# Patient Record
Sex: FEMALE | Race: WHITE | Employment: OTHER | URBAN - METROPOLITAN AREA
[De-identification: names, ages, dates, MRNs, and addresses within clinical notes are randomized per-mention and may not be internally consistent; named-entity substitution may affect disease eponyms.]

---

## 2017-01-17 DIAGNOSIS — E08.40 DIABETES MELLITUS DUE TO UNDERLYING CONDITION WITH DIABETIC NEUROPATHY, WITHOUT LONG-TERM CURRENT USE OF INSULIN (HCC): ICD-10-CM

## 2017-01-17 RX ORDER — GLIMEPIRIDE 4 MG/1
TABLET ORAL
Qty: 180 TABLET | Refills: 0 | Status: SHIPPED | OUTPATIENT
Start: 2017-01-17 | End: 2017-04-28

## 2017-01-31 DIAGNOSIS — I10 ESSENTIAL HYPERTENSION WITH GOAL BLOOD PRESSURE LESS THAN 130/80: ICD-10-CM

## 2017-01-31 DIAGNOSIS — M17.0 PRIMARY OSTEOARTHRITIS OF BOTH KNEES: Primary | ICD-10-CM

## 2017-02-01 RX ORDER — FUROSEMIDE 20 MG/1
20 TABLET ORAL DAILY
Qty: 90 TABLET | Refills: 0 | Status: SHIPPED | OUTPATIENT
Start: 2017-02-01 | End: 2017-06-07

## 2017-02-01 NOTE — TELEPHONE ENCOUNTER
From: Shelton Poe  To: Rae Meier MD  Sent: 1/31/2017 7:26 PM CST  Subject: Medication Renewal Request    Original authorizing provider: MD Shelton Tran would like a refill of the following medications:  FUROSEMIDE 20 MG Oral

## 2017-02-01 NOTE — TELEPHONE ENCOUNTER
From: Pratima Prabhakar  To: Riana Mckenzie MD  Sent: 1/31/2017 7:28 PM CST  Subject: Medication Renewal Request    Original authorizing provider: MD Pratima Mccloud would like a refill of the following medications:  HYDROcodone-acetaminop

## 2017-02-02 RX ORDER — HYDROCODONE BITARTRATE AND ACETAMINOPHEN 10; 325 MG/1; MG/1
1 TABLET ORAL EVERY 6 HOURS PRN
Qty: 120 TABLET | Refills: 0 | Status: SHIPPED | OUTPATIENT
Start: 2017-02-02 | End: 2017-04-10

## 2017-02-07 DIAGNOSIS — E08.40 DIABETES MELLITUS DUE TO UNDERLYING CONDITION WITH DIABETIC NEUROPATHY, WITHOUT LONG-TERM CURRENT USE OF INSULIN (HCC): Primary | ICD-10-CM

## 2017-02-13 ENCOUNTER — TELEPHONE (OUTPATIENT)
Dept: INTERNAL MEDICINE CLINIC | Facility: CLINIC | Age: 82
End: 2017-02-13

## 2017-02-13 NOTE — TELEPHONE ENCOUNTER
GÓMEZ CONTOUR NEXT TEST In Vitro Strip was prescribed to pt - we received fax from [facility] stating that this rx is not Medicare compliant - please send alternate rx to [phone]. Phone #[phone].    Paper in triage

## 2017-02-23 DIAGNOSIS — E78.2 HYPERLIPEMIA, MIXED: Primary | ICD-10-CM

## 2017-02-23 RX ORDER — ATORVASTATIN CALCIUM 80 MG/1
TABLET, FILM COATED ORAL
Qty: 90 TABLET | Refills: 0 | Status: SHIPPED | OUTPATIENT
Start: 2017-02-23 | End: 2017-05-24

## 2017-03-08 ENCOUNTER — MED REC SCAN ONLY (OUTPATIENT)
Dept: INTERNAL MEDICINE CLINIC | Facility: CLINIC | Age: 82
End: 2017-03-08

## 2017-03-08 DIAGNOSIS — E08.40 DIABETES MELLITUS DUE TO UNDERLYING CONDITION WITH DIABETIC NEUROPATHY, WITHOUT LONG-TERM CURRENT USE OF INSULIN (HCC): ICD-10-CM

## 2017-03-08 DIAGNOSIS — E03.4 HYPOTHYROIDISM DUE TO ACQUIRED ATROPHY OF THYROID: ICD-10-CM

## 2017-03-08 DIAGNOSIS — E78.2 HYPERLIPEMIA, MIXED: Primary | ICD-10-CM

## 2017-03-08 DIAGNOSIS — I10 ESSENTIAL HYPERTENSION WITH GOAL BLOOD PRESSURE LESS THAN 130/80: ICD-10-CM

## 2017-04-10 ENCOUNTER — OFFICE VISIT (OUTPATIENT)
Dept: INTERNAL MEDICINE CLINIC | Facility: CLINIC | Age: 82
End: 2017-04-10

## 2017-04-10 ENCOUNTER — APPOINTMENT (OUTPATIENT)
Dept: LAB | Age: 82
End: 2017-04-10
Attending: INTERNAL MEDICINE
Payer: MEDICARE

## 2017-04-10 VITALS
DIASTOLIC BLOOD PRESSURE: 60 MMHG | TEMPERATURE: 98 F | OXYGEN SATURATION: 98 % | SYSTOLIC BLOOD PRESSURE: 130 MMHG | HEIGHT: 69 IN | BODY MASS INDEX: 22.81 KG/M2 | WEIGHT: 154 LBS | RESPIRATION RATE: 16 BRPM | HEART RATE: 82 BPM

## 2017-04-10 DIAGNOSIS — R56.9 SEIZURE (HCC): ICD-10-CM

## 2017-04-10 DIAGNOSIS — K21.9 GASTROESOPHAGEAL REFLUX DISEASE WITHOUT ESOPHAGITIS: ICD-10-CM

## 2017-04-10 DIAGNOSIS — I10 ESSENTIAL HYPERTENSION WITH GOAL BLOOD PRESSURE LESS THAN 130/80: ICD-10-CM

## 2017-04-10 DIAGNOSIS — E78.2 HYPERLIPEMIA, MIXED: ICD-10-CM

## 2017-04-10 DIAGNOSIS — M17.0 PRIMARY OSTEOARTHRITIS OF BOTH KNEES: ICD-10-CM

## 2017-04-10 DIAGNOSIS — N18.30 UNCONTROLLED SECONDARY DIABETES MELLITUS WITH STAGE 3 CKD (GFR 30-59) (HCC): ICD-10-CM

## 2017-04-10 DIAGNOSIS — Z13.31 DEPRESSION SCREENING: ICD-10-CM

## 2017-04-10 DIAGNOSIS — I25.10 ATHEROSCLEROSIS OF NATIVE CORONARY ARTERY OF NATIVE HEART WITHOUT ANGINA PECTORIS: ICD-10-CM

## 2017-04-10 DIAGNOSIS — Z00.00 ENCOUNTER FOR ANNUAL HEALTH EXAMINATION: ICD-10-CM

## 2017-04-10 DIAGNOSIS — E03.4 HYPOTHYROIDISM DUE TO ACQUIRED ATROPHY OF THYROID: ICD-10-CM

## 2017-04-10 DIAGNOSIS — E08.40 DIABETES MELLITUS DUE TO UNDERLYING CONDITION WITH DIABETIC NEUROPATHY, WITHOUT LONG-TERM CURRENT USE OF INSULIN (HCC): ICD-10-CM

## 2017-04-10 DIAGNOSIS — E11.40 PAINFUL DIABETIC NEUROPATHY (HCC): ICD-10-CM

## 2017-04-10 DIAGNOSIS — E13.65 UNCONTROLLED SECONDARY DIABETES MELLITUS WITH STAGE 3 CKD (GFR 30-59) (HCC): ICD-10-CM

## 2017-04-10 DIAGNOSIS — E13.22 UNCONTROLLED SECONDARY DIABETES MELLITUS WITH STAGE 3 CKD (GFR 30-59) (HCC): ICD-10-CM

## 2017-04-10 DIAGNOSIS — I73.9 PAD (PERIPHERAL ARTERY DISEASE) (HCC): ICD-10-CM

## 2017-04-10 DIAGNOSIS — L97.522 DIABETIC ULCER OF TOE OF LEFT FOOT ASSOCIATED WITH TYPE 2 DIABETES MELLITUS, WITH FAT LAYER EXPOSED (HCC): ICD-10-CM

## 2017-04-10 DIAGNOSIS — Z86.73 HISTORY OF STROKE: ICD-10-CM

## 2017-04-10 DIAGNOSIS — E11.40 TYPE 2 DIABETES MELLITUS WITH DIABETIC NEUROPATHY, WITHOUT LONG-TERM CURRENT USE OF INSULIN (HCC): ICD-10-CM

## 2017-04-10 DIAGNOSIS — I48.20 CHRONIC ATRIAL FIBRILLATION (HCC): ICD-10-CM

## 2017-04-10 DIAGNOSIS — Z00.00 PHYSICAL EXAM, ANNUAL: Primary | ICD-10-CM

## 2017-04-10 DIAGNOSIS — E11.621 DIABETIC ULCER OF TOE OF LEFT FOOT ASSOCIATED WITH TYPE 2 DIABETES MELLITUS, WITH FAT LAYER EXPOSED (HCC): ICD-10-CM

## 2017-04-10 DIAGNOSIS — K21.9 GASTROESOPHAGEAL REFLUX DISEASE WITHOUT ESOPHAGITIS: Primary | ICD-10-CM

## 2017-04-10 PROBLEM — IMO0002 UNCONTROLLED SECONDARY DIABETES MELLITUS WITH STAGE 3 CKD (GFR 30-59): Status: ACTIVE | Noted: 2017-04-10

## 2017-04-10 PROCEDURE — G0444 DEPRESSION SCREEN ANNUAL: HCPCS | Performed by: INTERNAL MEDICINE

## 2017-04-10 PROCEDURE — 36415 COLL VENOUS BLD VENIPUNCTURE: CPT

## 2017-04-10 PROCEDURE — 80061 LIPID PANEL: CPT

## 2017-04-10 PROCEDURE — 82570 ASSAY OF URINE CREATININE: CPT

## 2017-04-10 PROCEDURE — 84443 ASSAY THYROID STIM HORMONE: CPT

## 2017-04-10 PROCEDURE — 80053 COMPREHEN METABOLIC PANEL: CPT

## 2017-04-10 PROCEDURE — 83036 HEMOGLOBIN GLYCOSYLATED A1C: CPT

## 2017-04-10 PROCEDURE — 82043 UR ALBUMIN QUANTITATIVE: CPT

## 2017-04-10 PROCEDURE — G0439 PPPS, SUBSEQ VISIT: HCPCS | Performed by: INTERNAL MEDICINE

## 2017-04-10 RX ORDER — NORTRIPTYLINE HYDROCHLORIDE 25 MG/1
25 CAPSULE ORAL NIGHTLY
Qty: 90 CAPSULE | Refills: 1 | Status: SHIPPED | OUTPATIENT
Start: 2017-04-10 | End: 2017-10-04

## 2017-04-10 RX ORDER — HYDROCODONE BITARTRATE AND ACETAMINOPHEN 10; 325 MG/1; MG/1
1 TABLET ORAL EVERY 6 HOURS PRN
Qty: 120 TABLET | Refills: 0 | Status: SHIPPED | OUTPATIENT
Start: 2017-04-10 | End: 2017-06-29

## 2017-04-10 NOTE — PROGRESS NOTES
HPI:   Lilliam Monsalve is a 80year old female who presents for a Medicare Subsequent Annual Wellness visit (Pt already had Initial Annual Wellness). HPI:  Here for physical  Overall fells well. Using walker.  No cp or sob  No new stroke sxs  Denies w allergic to adhesive tape and sulfa antibiotics.     CURRENT MEDICATIONS:     Outpatient Prescriptions Marked as Taking for the 4/10/17 encounter (Office Visit) with Andre Singh MD:  [address] 40 MG Oral Capsule Delayed Release TAKE ONE CAPSULE BY MOUTH TWO Gout (9/16/2010); Measles (9/16/2010); Rubella (9/16/2010); Scarlet fever (9/16/2010); Coronary atherosclerosis of unspecified type of vessel, native or graft (6/29/2012); Atrial [facility] (6/29/2012); Mitral valve disorders (6/29/2012);  Vianey Child single; oral surgery procedure (age 25); tonsillectomy (age 10); active wound care/20 cm or < (9/27/2011); other surgical history (10/2011); colonoscopy (7/14/2014); colonoscopy (7/15/2014); removal of heel spur; laminectomy; Carotid endarterectomy (10/17/1 20/20   Both Eyes Visual Acuity: Corrected     Able To Tolerate Visual Acuity: Yes      General Appearance:  Alert, cooperative, no distress, appears stated age   Head:  Normocephalic, without obvious abnormality, atraumatic   Eyes:  PERRL, conjunctiva/cor ASSESSMENT AND OTHER RELEVANT CHRONIC CONDITIONS:   Zeeshan Quiñones is a 80year old female who presents for a Medicare Assessment.      PLAN SUMMARY:   Diagnoses and all orders for this visit:    Physical exam, annual    Seizure ([facility]    Hyperlipemi stable. Cont statin and plavix     Atherosclerosis of native coronary artery of native heart without angina pectoris- stable, cont cardiac risk factors control. Cont plavix, cont statin.  Pt does see cardiology     Chronic atrial fibrillation ([address]- rate co female age 47-67, do you take aspirin?: Yes    Have you had any immunizations at another office such as Influenza, Hepatitis B, Tetanus, or Pneumococcal?: No     Functional Ability     Bathing or Showering: Able without help    Toileting: Able without help \"Ball\": Correct    Recall \"Flag\": Correct    Recall \"Tree\": Correct       This section provided for quick review of chart, separate sheet to patient  PREVENTATIVE SERVICES  INDICATIONS AND SCHEDULE Internal Lab or Procedure External Lab or Procedure data found. Screening Mammogram      Mammogram Annually to 76, then as discussed There are no preventive care reminders to display for this patient.  Update Health Maintenance if applicable     Immunizations (Update Immunization Activity if applicable) Value   05/12/2011 32*    No flowsheet data found. Drug Serum Conc  Annually No results found for: DIGOXIN, DIG, VALP No flowsheet data found.     Diabetes      HgbA1C  Annually HEMOGLOBIN A1C (% of total Hgb)   Date Value   05/12/2011 6.2*     HGBA1C ( DAY Disp: 90 tablet Rfl: 3   MONTELUKAST SODIUM 10 MG Oral Tab TAKE 1 TABLET BY MOUTH ONCE DAILY. Disp: 90 tablet Rfl: 1   LEVOTHYROXINE SODIUM 100 MCG Oral Tab TAKE 1 TABLET BY MOUTH ONCE DAILY.  Disp: 90 tablet Rfl: 1   penicillin v potassium 250 MG Oral fat layer exposed (hcc)  Primary osteoarthritis of both knees  Painful diabetic neuropathy (hcc)  Encounter for annual health examination  Depression screening  Uncontrolled secondary diabetes mellitus with stage 3 ckd (gfr 30-59) ([facility])      Orders Placed

## 2017-04-10 NOTE — PATIENT INSTRUCTIONS
What Is Arthritis in the Foot? Degenerative arthritis is a condition that slowly wears away joints, the area where bones meet and move. In the beginning, you may notice that the affected joint seems stiff. It may even ache.  As the joint lining (cartilag • Hypertension   • Dyslipidemia   • Obesity (BMI ³30 kg/m2)   • Previous elevated impaired FBS or GTT   … or any two of the following:   • Overweight (BMI ³25 but <30)   • Family history of diabetes   • Age 72 years or older   • History of gestational diab Flex Sigmoidoscopy Screen  Covered every 5 years No results found for this or any previous visit. No flowsheet data found. Fecal Occult Blood   Covered Annually No results found for: FOB, OCCULTSTOOL No flowsheet data found.      Barium Enema-   uncomf Orders placed or performed in visit on 09/26/13  -INFLUENZA VIRUS VACCINE, PRESERV FREE, >=1YEARS OF AGE   -ADMIN INFLUENZA VIRUS VAC   Orders placed or performed in visit on 09/18/12  -INFLUENZA VIRUS VACCINE, PRESERV FREE, >=1YEARS OF AGE   -ADMIN INFL A link to the [employer]. This site has a lot of good information including definitions of the different types of Advance Directives.  It also has the State forms available on it's website for anyone to review and print using their home

## 2017-04-20 DIAGNOSIS — E03.4 HYPOTHYROIDISM DUE TO ACQUIRED ATROPHY OF THYROID: Primary | ICD-10-CM

## 2017-04-20 RX ORDER — LEVOTHYROXINE SODIUM 0.1 MG/1
TABLET ORAL
Qty: 90 TABLET | Refills: 1 | Status: SHIPPED | OUTPATIENT
Start: 2017-04-20 | End: 2017-10-10

## 2017-04-28 DIAGNOSIS — E08.40 DIABETES MELLITUS DUE TO UNDERLYING CONDITION WITH DIABETIC NEUROPATHY, WITHOUT LONG-TERM CURRENT USE OF INSULIN (HCC): Primary | ICD-10-CM

## 2017-04-28 RX ORDER — GLIMEPIRIDE 4 MG/1
TABLET ORAL
Qty: 180 TABLET | Refills: 1 | Status: SHIPPED | OUTPATIENT
Start: 2017-04-28 | End: 2017-10-10

## 2017-05-18 RX ORDER — MONTELUKAST SODIUM 10 MG/1
TABLET ORAL
Qty: 90 TABLET | Refills: 0 | Status: SHIPPED | OUTPATIENT
Start: 2017-05-18 | End: 2017-08-16

## 2017-05-24 DIAGNOSIS — E78.2 HYPERLIPEMIA, MIXED: Primary | ICD-10-CM

## 2017-05-24 DIAGNOSIS — Z86.73 HISTORY OF STROKE: ICD-10-CM

## 2017-05-24 DIAGNOSIS — I25.10 ATHEROSCLEROSIS OF NATIVE CORONARY ARTERY OF NATIVE HEART WITHOUT ANGINA PECTORIS: ICD-10-CM

## 2017-05-24 RX ORDER — ATORVASTATIN CALCIUM 80 MG/1
TABLET, FILM COATED ORAL
Qty: 90 TABLET | Refills: 1 | Status: SHIPPED | OUTPATIENT
Start: 2017-05-24 | End: 2017-10-10

## 2017-05-31 PROBLEM — E78.00 HYPERCHOLESTEROLEMIA: Status: ACTIVE | Noted: 2017-05-31

## 2017-06-07 DIAGNOSIS — I10 ESSENTIAL HYPERTENSION WITH GOAL BLOOD PRESSURE LESS THAN 130/80: Primary | ICD-10-CM

## 2017-06-07 RX ORDER — FUROSEMIDE 20 MG/1
TABLET ORAL
Qty: 90 TABLET | Refills: 1 | Status: SHIPPED | OUTPATIENT
Start: 2017-06-07 | End: 2017-11-30

## 2017-06-29 DIAGNOSIS — M17.0 PRIMARY OSTEOARTHRITIS OF BOTH KNEES: ICD-10-CM

## 2017-06-29 RX ORDER — HYDROCODONE BITARTRATE AND ACETAMINOPHEN 10; 325 MG/1; MG/1
1 TABLET ORAL EVERY 6 HOURS PRN
Qty: 120 TABLET | Refills: 0 | Status: SHIPPED | OUTPATIENT
Start: 2017-06-29 | End: 2017-09-06

## 2017-08-16 DIAGNOSIS — J30.89 ENVIRONMENTAL AND SEASONAL ALLERGIES: Primary | ICD-10-CM

## 2017-08-16 RX ORDER — MONTELUKAST SODIUM 10 MG/1
TABLET ORAL
Qty: 90 TABLET | Refills: 1 | Status: SHIPPED | OUTPATIENT
Start: 2017-08-16 | End: 2018-02-09

## 2017-09-06 DIAGNOSIS — M17.0 PRIMARY OSTEOARTHRITIS OF BOTH KNEES: ICD-10-CM

## 2017-09-06 RX ORDER — HYDROCODONE BITARTRATE AND ACETAMINOPHEN 10; 325 MG/1; MG/1
1 TABLET ORAL EVERY 6 HOURS PRN
Qty: 120 TABLET | Refills: 0 | Status: SHIPPED | OUTPATIENT
Start: 2017-09-06 | End: 2017-10-10

## 2017-09-06 NOTE — TELEPHONE ENCOUNTER
Spoke with Akil Cruz she is aware RX will be ready for  tomorrow after 10am.      Sent to Dr Matthew Solis for approval.

## 2017-09-06 NOTE — TELEPHONE ENCOUNTER
Pt's daughter, Rayshawn Mishra, called to ask for a refill of HYDROcodone-acetaminophen  MG for the pt - please c/b at [phone].

## 2017-10-04 DIAGNOSIS — E11.40 PAINFUL DIABETIC NEUROPATHY (HCC): ICD-10-CM

## 2017-10-05 RX ORDER — NORTRIPTYLINE HYDROCHLORIDE 25 MG/1
25 CAPSULE ORAL NIGHTLY
Qty: 90 CAPSULE | Refills: 1 | Status: SHIPPED | OUTPATIENT
Start: 2017-10-05 | End: 2018-04-17

## 2017-10-10 ENCOUNTER — OFFICE VISIT (OUTPATIENT)
Dept: INTERNAL MEDICINE CLINIC | Facility: CLINIC | Age: 82
End: 2017-10-10

## 2017-10-10 ENCOUNTER — APPOINTMENT (OUTPATIENT)
Dept: LAB | Age: 82
End: 2017-10-10
Attending: INTERNAL MEDICINE
Payer: MEDICARE

## 2017-10-10 VITALS
RESPIRATION RATE: 16 BRPM | WEIGHT: 158.63 LBS | TEMPERATURE: 97 F | HEART RATE: 62 BPM | OXYGEN SATURATION: 97 % | DIASTOLIC BLOOD PRESSURE: 52 MMHG | BODY MASS INDEX: 23.49 KG/M2 | SYSTOLIC BLOOD PRESSURE: 130 MMHG | HEIGHT: 69 IN

## 2017-10-10 DIAGNOSIS — Z23 NEED FOR INFLUENZA VACCINATION: ICD-10-CM

## 2017-10-10 DIAGNOSIS — E03.4 HYPOTHYROIDISM DUE TO ACQUIRED ATROPHY OF THYROID: ICD-10-CM

## 2017-10-10 DIAGNOSIS — M17.0 PRIMARY OSTEOARTHRITIS OF BOTH KNEES: ICD-10-CM

## 2017-10-10 DIAGNOSIS — E13.65 UNCONTROLLED SECONDARY DIABETES MELLITUS WITH STAGE 3 CKD (GFR 30-59) (HCC): Primary | ICD-10-CM

## 2017-10-10 DIAGNOSIS — I10 ESSENTIAL HYPERTENSION WITH GOAL BLOOD PRESSURE LESS THAN 130/80: ICD-10-CM

## 2017-10-10 DIAGNOSIS — E78.2 HYPERLIPEMIA, MIXED: ICD-10-CM

## 2017-10-10 DIAGNOSIS — N18.30 UNCONTROLLED SECONDARY DIABETES MELLITUS WITH STAGE 3 CKD (GFR 30-59) (HCC): Primary | ICD-10-CM

## 2017-10-10 DIAGNOSIS — E13.22 UNCONTROLLED SECONDARY DIABETES MELLITUS WITH STAGE 3 CKD (GFR 30-59) (HCC): Primary | ICD-10-CM

## 2017-10-10 DIAGNOSIS — Z86.73 HISTORY OF STROKE: ICD-10-CM

## 2017-10-10 DIAGNOSIS — E08.40 DIABETES MELLITUS DUE TO UNDERLYING CONDITION WITH DIABETIC NEUROPATHY, WITHOUT LONG-TERM CURRENT USE OF INSULIN (HCC): ICD-10-CM

## 2017-10-10 DIAGNOSIS — N18.30 UNCONTROLLED SECONDARY DIABETES MELLITUS WITH STAGE 3 CKD (GFR 30-59) (HCC): ICD-10-CM

## 2017-10-10 DIAGNOSIS — L30.9 DERMATITIS: ICD-10-CM

## 2017-10-10 DIAGNOSIS — I25.10 ATHEROSCLEROSIS OF NATIVE CORONARY ARTERY OF NATIVE HEART WITHOUT ANGINA PECTORIS: ICD-10-CM

## 2017-10-10 DIAGNOSIS — E13.22 UNCONTROLLED SECONDARY DIABETES MELLITUS WITH STAGE 3 CKD (GFR 30-59) (HCC): ICD-10-CM

## 2017-10-10 DIAGNOSIS — E13.65 UNCONTROLLED SECONDARY DIABETES MELLITUS WITH STAGE 3 CKD (GFR 30-59) (HCC): ICD-10-CM

## 2017-10-10 PROCEDURE — 90653 IIV ADJUVANT VACCINE IM: CPT | Performed by: INTERNAL MEDICINE

## 2017-10-10 PROCEDURE — 99214 OFFICE O/P EST MOD 30 MIN: CPT | Performed by: INTERNAL MEDICINE

## 2017-10-10 PROCEDURE — 36415 COLL VENOUS BLD VENIPUNCTURE: CPT

## 2017-10-10 PROCEDURE — 80053 COMPREHEN METABOLIC PANEL: CPT

## 2017-10-10 PROCEDURE — 83036 HEMOGLOBIN GLYCOSYLATED A1C: CPT

## 2017-10-10 PROCEDURE — G0008 ADMIN INFLUENZA VIRUS VAC: HCPCS | Performed by: INTERNAL MEDICINE

## 2017-10-10 RX ORDER — ATORVASTATIN CALCIUM 80 MG/1
80 TABLET, FILM COATED ORAL
Qty: 90 TABLET | Refills: 1 | Status: SHIPPED | OUTPATIENT
Start: 2017-10-10 | End: 2017-11-30

## 2017-10-10 RX ORDER — LEVOTHYROXINE SODIUM 0.1 MG/1
100 TABLET ORAL
Qty: 90 TABLET | Refills: 1 | Status: SHIPPED | OUTPATIENT
Start: 2017-10-10 | End: 2018-04-28

## 2017-10-10 RX ORDER — FLUOCINONIDE 0.5 MG/G
OINTMENT TOPICAL
Qty: 30 G | Refills: 0 | Status: SHIPPED | OUTPATIENT
Start: 2017-10-10 | End: 2019-01-01

## 2017-10-10 RX ORDER — GLIMEPIRIDE 4 MG/1
TABLET ORAL
Qty: 180 TABLET | Refills: 1 | Status: SHIPPED | OUTPATIENT
Start: 2017-10-10 | End: 2018-05-29

## 2017-10-10 RX ORDER — HYDROCODONE BITARTRATE AND ACETAMINOPHEN 10; 325 MG/1; MG/1
1 TABLET ORAL EVERY 6 HOURS PRN
Qty: 120 TABLET | Refills: 0 | Status: SHIPPED | OUTPATIENT
Start: 2017-10-10 | End: 2018-01-02

## 2017-10-10 NOTE — PROGRESS NOTES
HPI:    Patient ID: Nabil García is a 80year old female. HPI  HPI:   Nabil García is a 80year old female who presents for recheck of her diabetes, htn and hld. Patient’s FBS have been stable. Last visit with ophthalmologist was 10 m ago.   Pt h TAKE 1 TABLET (4 MG TOTAL) BY MOUTH 2 TIMES DAILY. Disp: 180 tablet Rfl: 1   Levothyroxine Sodium 100 MCG Oral Tab Take 1 tablet (100 mcg total) by mouth once daily.  Disp: 90 tablet Rfl: 1   atorvastatin 80 MG Oral Tab Take 1 tablet (80 mg total) by mouth myocardial infarction 3/19/2012   • Atherosclerosis of coronary artery    • Atrial fibrillation ([facility] 6/29/2012   • Back pain 4/8/2014    Lumbar    • Carotid stenosis 4/8/2014   • Carpal tunnel syndrome 6/20/2013    Log Date: 01/17/2012    • Cataract     l 6/29/2012   • Unspecified venous (peripheral) insufficiency 6/29/2012      Past Surgical History:  9/27/2011: ACTIVE WOUND CARE/20 CM OR <      Comment: diabetic ulcer L great toe  No date: ANGIOGRAM      Comment: 2004  No date: ANGIOGRAM      Comment: 11/ unusual skin lesions or rashes  RESPIRATORY: denies shortness of breath with exertion  CARDIOVASCULAR: denies chest pain on exertion  GI: denies abdominal pain and denies heartburn  NEURO: denies headaches    EXAM:   /52   Pulse 62   Temp (!) 97.1 °F 80 MG Oral Tab Take 1 tablet (80 mg total) by mouth once daily. Disp: 90 tablet Rfl: 1   NORTRIPTYLINE HCL 25 MG Oral Cap TAKE 1 CAPSULE (25 MG TOTAL) BY MOUTH NIGHTLY.  Disp: 90 capsule Rfl: 1   CLOPIDOGREL BISULFATE 75 MG Oral Tab TAKE ONE TABLET BY MOUTH (hcc)  (primary encounter diagnosis)  Dermatitis  Primary osteoarthritis of both knees  Need for influenza vaccination  Diabetes mellitus due to underlying condition with diabetic neuropathy, without long-term current use of insulin (hcc)  Hypothyroidism d

## 2017-11-25 ENCOUNTER — TELEPHONE (OUTPATIENT)
Dept: INTERNAL MEDICINE CLINIC | Facility: CLINIC | Age: 82
End: 2017-11-25

## 2017-11-25 DIAGNOSIS — E08.40 DIABETES MELLITUS DUE TO UNDERLYING CONDITION WITH DIABETIC NEUROPATHY, WITHOUT LONG-TERM CURRENT USE OF INSULIN (HCC): ICD-10-CM

## 2017-11-25 NOTE — TELEPHONE ENCOUNTER
Form faxed from [place] requesting to fill out completely for a refill on She Contour Next test Strips  And Lancets. Please indicate directions, and quantity/refills. Please see form in triage.

## 2017-12-13 ENCOUNTER — MED REC SCAN ONLY (OUTPATIENT)
Dept: INTERNAL MEDICINE CLINIC | Facility: CLINIC | Age: 82
End: 2017-12-13

## 2017-12-19 RX ORDER — NITROGLYCERIN 40 MG/1
1 PATCH TRANSDERMAL
Qty: 30 PATCH | Refills: 5 | Status: SHIPPED
Start: 2017-12-19 | End: 2019-01-01

## 2017-12-19 NOTE — TELEPHONE ENCOUNTER
From: Pratima Prabhakar  Sent: 12/19/2017 2:18 PM CST  Subject: Medication Renewal Request    Vika Alexander.  Armida Alok would like a refill of the following medications:     nitroGLYCERIN (NITRODUR) 0.2 MG/HR Transdermal Patch 24 Hr Luciana Slaughter MD]   Patient Com

## 2018-01-01 ENCOUNTER — TELEPHONE (OUTPATIENT)
Dept: INTERNAL MEDICINE CLINIC | Facility: CLINIC | Age: 83
End: 2018-01-01

## 2018-01-01 ENCOUNTER — MED REC SCAN ONLY (OUTPATIENT)
Dept: INTERNAL MEDICINE CLINIC | Facility: CLINIC | Age: 83
End: 2018-01-01

## 2018-01-01 ENCOUNTER — OFFICE VISIT (OUTPATIENT)
Dept: FAMILY MEDICINE CLINIC | Facility: CLINIC | Age: 83
End: 2018-01-01
Payer: MEDICARE

## 2018-01-01 ENCOUNTER — APPOINTMENT (OUTPATIENT)
Dept: LAB | Age: 83
End: 2018-01-01
Attending: INTERNAL MEDICINE
Payer: MEDICARE

## 2018-01-01 ENCOUNTER — LAB REQUISITION (OUTPATIENT)
Dept: LAB | Age: 83
End: 2018-01-01
Payer: MEDICARE

## 2018-01-01 ENCOUNTER — OFFICE VISIT (OUTPATIENT)
Dept: INTERNAL MEDICINE CLINIC | Facility: CLINIC | Age: 83
End: 2018-01-01
Payer: MEDICARE

## 2018-01-01 VITALS
DIASTOLIC BLOOD PRESSURE: 62 MMHG | RESPIRATION RATE: 20 BRPM | WEIGHT: 153 LBS | TEMPERATURE: 97 F | BODY MASS INDEX: 23 KG/M2 | HEART RATE: 75 BPM | SYSTOLIC BLOOD PRESSURE: 112 MMHG | OXYGEN SATURATION: 98 %

## 2018-01-01 VITALS — SYSTOLIC BLOOD PRESSURE: 112 MMHG | HEART RATE: 68 BPM | RESPIRATION RATE: 16 BRPM | DIASTOLIC BLOOD PRESSURE: 70 MMHG

## 2018-01-01 DIAGNOSIS — N18.30 UNCONTROLLED SECONDARY DIABETES MELLITUS WITH STAGE 3 CKD (GFR 30-59) (HCC): Primary | ICD-10-CM

## 2018-01-01 DIAGNOSIS — E11.40 PAINFUL DIABETIC NEUROPATHY (HCC): ICD-10-CM

## 2018-01-01 DIAGNOSIS — I10 ESSENTIAL HYPERTENSION WITH GOAL BLOOD PRESSURE LESS THAN 130/80: ICD-10-CM

## 2018-01-01 DIAGNOSIS — E78.2 HYPERLIPEMIA, MIXED: ICD-10-CM

## 2018-01-01 DIAGNOSIS — E08.40 DIABETES MELLITUS DUE TO UNDERLYING CONDITION WITH DIABETIC NEUROPATHY, WITHOUT LONG-TERM CURRENT USE OF INSULIN (HCC): ICD-10-CM

## 2018-01-01 DIAGNOSIS — M62.838 MUSCLE SPASM: ICD-10-CM

## 2018-01-01 DIAGNOSIS — Z23 NEED FOR INFLUENZA VACCINATION: ICD-10-CM

## 2018-01-01 DIAGNOSIS — L03.012 PARONYCHIA OF FINGER, LEFT: Primary | ICD-10-CM

## 2018-01-01 DIAGNOSIS — E03.4 HYPOTHYROIDISM DUE TO ACQUIRED ATROPHY OF THYROID: ICD-10-CM

## 2018-01-01 DIAGNOSIS — E11.36 TYPE 2 DIABETES MELLITUS WITH DIABETIC CATARACT (HCC): ICD-10-CM

## 2018-01-01 DIAGNOSIS — L03.012 PARONYCHIA OF FINGER, LEFT: ICD-10-CM

## 2018-01-01 DIAGNOSIS — M17.0 PRIMARY OSTEOARTHRITIS OF BOTH KNEES: ICD-10-CM

## 2018-01-01 DIAGNOSIS — I25.10 ATHEROSCLEROTIC HEART DISEASE OF NATIVE CORONARY ARTERY WITHOUT ANGINA PECTORIS: ICD-10-CM

## 2018-01-01 DIAGNOSIS — M79.10 MYALGIA: Primary | ICD-10-CM

## 2018-01-01 DIAGNOSIS — E11.40 TYPE 2 DIABETES MELLITUS WITH DIABETIC NEUROPATHY (HCC): ICD-10-CM

## 2018-01-01 DIAGNOSIS — K21.9 GASTROESOPHAGEAL REFLUX DISEASE WITHOUT ESOPHAGITIS: ICD-10-CM

## 2018-01-01 DIAGNOSIS — E13.65 UNCONTROLLED SECONDARY DIABETES MELLITUS WITH STAGE 3 CKD (GFR 30-59) (HCC): Primary | ICD-10-CM

## 2018-01-01 DIAGNOSIS — M79.10 MYALGIA: ICD-10-CM

## 2018-01-01 DIAGNOSIS — E13.22 UNCONTROLLED SECONDARY DIABETES MELLITUS WITH STAGE 3 CKD (GFR 30-59) (HCC): Primary | ICD-10-CM

## 2018-01-01 LAB
ALBUMIN SERPL-MCNC: 3 G/DL (ref 3.5–4.8)
ALBUMIN/GLOB SERPL: 0.8 {RATIO} (ref 1–2)
ALP LIVER SERPL-CCNC: 102 U/L (ref 55–142)
ALT SERPL-CCNC: 18 U/L (ref 14–54)
ANION GAP SERPL CALC-SCNC: 7 MMOL/L (ref 0–18)
AST SERPL-CCNC: 21 U/L (ref 15–41)
BILIRUB SERPL-MCNC: 0.4 MG/DL (ref 0.1–2)
BUN BLD-MCNC: 42 MG/DL (ref 8–20)
BUN/CREAT SERPL: 29.4 (ref 10–20)
CALCIUM BLD-MCNC: 8.7 MG/DL (ref 8.3–10.3)
CHLORIDE SERPL-SCNC: 100 MMOL/L (ref 101–111)
CO2 SERPL-SCNC: 30 MMOL/L (ref 22–32)
CREAT BLD-MCNC: 1.43 MG/DL (ref 0.55–1.02)
EST. AVERAGE GLUCOSE BLD GHB EST-MCNC: 171 MG/DL (ref 68–126)
GLOBULIN PLAS-MCNC: 3.7 G/DL (ref 2.5–4)
GLUCOSE BLD-MCNC: 78 MG/DL (ref 70–99)
HBA1C MFR BLD HPLC: 7.6 % (ref ?–5.7)
M PROTEIN MFR SERPL ELPH: 6.7 G/DL (ref 6.1–8.3)
OSMOLALITY SERPL CALC.SUM OF ELEC: 293 MOSM/KG (ref 275–295)
POTASSIUM SERPL-SCNC: 4.3 MMOL/L (ref 3.6–5.1)
SODIUM SERPL-SCNC: 137 MMOL/L (ref 136–144)
TSI SER-ACNC: 2.32 MIU/ML (ref 0.35–5.5)

## 2018-01-01 PROCEDURE — 99213 OFFICE O/P EST LOW 20 MIN: CPT | Performed by: NURSE PRACTITIONER

## 2018-01-01 PROCEDURE — 82550 ASSAY OF CK (CPK): CPT

## 2018-01-01 PROCEDURE — 80053 COMPREHEN METABOLIC PANEL: CPT

## 2018-01-01 PROCEDURE — G0008 ADMIN INFLUENZA VIRUS VAC: HCPCS | Performed by: INTERNAL MEDICINE

## 2018-01-01 PROCEDURE — 90653 IIV ADJUVANT VACCINE IM: CPT | Performed by: INTERNAL MEDICINE

## 2018-01-01 PROCEDURE — 99214 OFFICE O/P EST MOD 30 MIN: CPT | Performed by: INTERNAL MEDICINE

## 2018-01-01 PROCEDURE — 80053 COMPREHEN METABOLIC PANEL: CPT | Performed by: INTERNAL MEDICINE

## 2018-01-01 PROCEDURE — 36415 COLL VENOUS BLD VENIPUNCTURE: CPT

## 2018-01-01 PROCEDURE — 83036 HEMOGLOBIN GLYCOSYLATED A1C: CPT | Performed by: INTERNAL MEDICINE

## 2018-01-01 PROCEDURE — 84443 ASSAY THYROID STIM HORMONE: CPT | Performed by: INTERNAL MEDICINE

## 2018-01-01 RX ORDER — CEPHALEXIN 500 MG/1
500 CAPSULE ORAL 2 TIMES DAILY
Qty: 20 CAPSULE | Refills: 0 | Status: SHIPPED | OUTPATIENT
Start: 2018-01-01 | End: 2018-01-01

## 2018-01-01 RX ORDER — LEVOTHYROXINE SODIUM 0.1 MG/1
100 TABLET ORAL
Qty: 90 TABLET | Refills: 1 | Status: SHIPPED | OUTPATIENT
Start: 2018-01-01 | End: 2019-01-01

## 2018-01-01 RX ORDER — PERPHENAZINE 16 MG/1
TABLET, FILM COATED ORAL
Qty: 50 EACH | Refills: 5 | Status: SHIPPED | OUTPATIENT
Start: 2018-01-01 | End: 2018-01-01

## 2018-01-01 RX ORDER — ESOMEPRAZOLE MAGNESIUM 40 MG/1
40 CAPSULE, DELAYED RELEASE ORAL
Qty: 90 CAPSULE | Refills: 1 | Status: SHIPPED | OUTPATIENT
Start: 2018-01-01 | End: 2019-01-01

## 2018-01-01 RX ORDER — LEVOTHYROXINE SODIUM 0.1 MG/1
TABLET ORAL
Qty: 30 TABLET | Refills: 0 | Status: SHIPPED | OUTPATIENT
Start: 2018-01-01 | End: 2018-01-01

## 2018-01-01 RX ORDER — NORTRIPTYLINE HYDROCHLORIDE 25 MG/1
CAPSULE ORAL
Qty: 90 CAPSULE | Refills: 1 | Status: SHIPPED | OUTPATIENT
Start: 2018-01-01 | End: 2019-01-01

## 2018-01-01 RX ORDER — HYDROCODONE BITARTRATE AND ACETAMINOPHEN 10; 325 MG/1; MG/1
1 TABLET ORAL EVERY 6 HOURS PRN
Qty: 120 TABLET | Refills: 0 | Status: SHIPPED | OUTPATIENT
Start: 2018-01-01 | End: 2019-01-01

## 2018-01-01 RX ORDER — HYDROCODONE BITARTRATE AND ACETAMINOPHEN 10; 325 MG/1; MG/1
1 TABLET ORAL EVERY 6 HOURS PRN
Qty: 120 TABLET | Refills: 0 | Status: SHIPPED | OUTPATIENT
Start: 2018-01-01 | End: 2018-01-01

## 2018-01-02 ENCOUNTER — TELEPHONE (OUTPATIENT)
Dept: INTERNAL MEDICINE CLINIC | Facility: CLINIC | Age: 83
End: 2018-01-02

## 2018-01-02 DIAGNOSIS — M17.0 PRIMARY OSTEOARTHRITIS OF BOTH KNEES: ICD-10-CM

## 2018-01-02 RX ORDER — HYDROCODONE BITARTRATE AND ACETAMINOPHEN 10; 325 MG/1; MG/1
1 TABLET ORAL EVERY 6 HOURS PRN
Qty: 120 TABLET | Refills: 0 | Status: ON HOLD | OUTPATIENT
Start: 2018-01-02 | End: 2018-03-26

## 2018-01-09 DIAGNOSIS — E03.4 HYPOTHYROIDISM DUE TO ACQUIRED ATROPHY OF THYROID: ICD-10-CM

## 2018-01-09 DIAGNOSIS — K21.9 GASTROESOPHAGEAL REFLUX DISEASE WITHOUT ESOPHAGITIS: ICD-10-CM

## 2018-01-09 DIAGNOSIS — N18.30 UNCONTROLLED SECONDARY DIABETES MELLITUS WITH STAGE 3 CKD (GFR 30-59) (HCC): ICD-10-CM

## 2018-01-09 DIAGNOSIS — I48.20 CHRONIC ATRIAL FIBRILLATION (HCC): ICD-10-CM

## 2018-01-09 DIAGNOSIS — E13.65 UNCONTROLLED SECONDARY DIABETES MELLITUS WITH STAGE 3 CKD (GFR 30-59) (HCC): ICD-10-CM

## 2018-01-09 DIAGNOSIS — I10 ESSENTIAL HYPERTENSION WITH GOAL BLOOD PRESSURE LESS THAN 130/80: ICD-10-CM

## 2018-01-09 DIAGNOSIS — E78.2 HYPERLIPEMIA, MIXED: ICD-10-CM

## 2018-01-09 DIAGNOSIS — E78.00 HYPERCHOLESTEROLEMIA: Primary | ICD-10-CM

## 2018-01-09 DIAGNOSIS — E13.22 UNCONTROLLED SECONDARY DIABETES MELLITUS WITH STAGE 3 CKD (GFR 30-59) (HCC): ICD-10-CM

## 2018-01-17 ENCOUNTER — HOSPITAL ENCOUNTER (EMERGENCY)
Age: 83
Discharge: HOME | End: 2018-01-17
Attending: EMERGENCY MEDICINE
Payer: MEDICARE

## 2018-01-17 ENCOUNTER — APPOINTMENT (OUTPATIENT)
Dept: GENERAL RADIOLOGY | Age: 83
End: 2018-01-17
Attending: EMERGENCY MEDICINE
Payer: MEDICARE

## 2018-01-17 VITALS
RESPIRATION RATE: 14 BRPM | HEART RATE: 74 BPM | WEIGHT: 160 LBS | SYSTOLIC BLOOD PRESSURE: 152 MMHG | DIASTOLIC BLOOD PRESSURE: 47 MMHG | BODY MASS INDEX: 24.25 KG/M2 | HEIGHT: 68 IN | OXYGEN SATURATION: 98 % | TEMPERATURE: 98 F

## 2018-01-17 DIAGNOSIS — S90.32XA CONTUSION OF LEFT FOOT, INITIAL ENCOUNTER: Primary | ICD-10-CM

## 2018-01-17 PROCEDURE — 73630 X-RAY EXAM OF FOOT: CPT | Performed by: EMERGENCY MEDICINE

## 2018-01-17 PROCEDURE — 99283 EMERGENCY DEPT VISIT LOW MDM: CPT

## 2018-01-18 NOTE — ED PROVIDER NOTES
Patient Seen in: [facility] In [place]    History   Patient presents with:  Lower Extremity Injury (musculoskeletal)  Fall (musculoskeletal, neurologic)    Stated Complaint: left foot pain post fall    HPI    31-year-old white female 6/29/2012   • Other nonspecific findings on examination of blood(790.99) 4/8/2014   • PAD (peripheral artery disease) ([facility] 4/8/2014   • Polyneuropathy in diabetes 6/29/2012   • Primary localized osteoarthrosis, upper arm 6/20/2013    Log Date: 01/17/2012 SURGERY PROCEDURE      Comment: wisdom teeth  No date: OTHER SURGICAL HISTORY      Comment: carotid artery surgery  10/2011: OTHER SURGICAL HISTORY      Comment: endartarectomy (left femoral)   No date: REMOVAL OF HEEL SPUR      Comment: left foot: 1972 an the toes without difficulty.     ED Course   Labs Reviewed - No data to display  X-ray study of the left foot revealed:  FINDINGS:    BONES:  Decreased bone mineralization limits evaluation of the osseous structures.  No acute displaced fracture appreciated

## 2018-02-09 DIAGNOSIS — J30.89 ENVIRONMENTAL AND SEASONAL ALLERGIES: ICD-10-CM

## 2018-02-11 RX ORDER — MONTELUKAST SODIUM 10 MG/1
TABLET ORAL
Qty: 90 TABLET | Refills: 1 | Status: SHIPPED | OUTPATIENT
Start: 2018-02-11 | End: 2018-08-22

## 2018-02-22 DIAGNOSIS — M17.0 PRIMARY OSTEOARTHRITIS OF BOTH KNEES: ICD-10-CM

## 2018-02-22 RX ORDER — HYDROCODONE BITARTRATE AND ACETAMINOPHEN 10; 325 MG/1; MG/1
1 TABLET ORAL EVERY 6 HOURS PRN
Qty: 120 TABLET | Refills: 0 | Status: ON HOLD | OUTPATIENT
Start: 2018-02-22 | End: 2018-03-26

## 2018-02-22 NOTE — TELEPHONE ENCOUNTER
Patient daughter, Pedro Matias , called and requested a script for Norco. Please call Pedro Matias when ready to .

## 2018-03-22 ENCOUNTER — HOSPITAL ENCOUNTER (OUTPATIENT)
Facility: HOSPITAL | Age: 83
Setting detail: OBSERVATION
Discharge: NURSING, REHAB OR LONG-TERM CARE | End: 2018-03-26
Attending: EMERGENCY MEDICINE | Admitting: HOSPITALIST
Payer: MEDICARE

## 2018-03-22 ENCOUNTER — APPOINTMENT (OUTPATIENT)
Dept: CT IMAGING | Facility: HOSPITAL | Age: 83
End: 2018-03-22
Attending: EMERGENCY MEDICINE
Payer: MEDICARE

## 2018-03-22 ENCOUNTER — APPOINTMENT (OUTPATIENT)
Dept: GENERAL RADIOLOGY | Facility: HOSPITAL | Age: 83
End: 2018-03-22
Attending: EMERGENCY MEDICINE
Payer: MEDICARE
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
## 2018-03-22 DIAGNOSIS — R07.9 ACUTE CHEST PAIN: Primary | ICD-10-CM

## 2018-03-22 DIAGNOSIS — M17.0 PRIMARY OSTEOARTHRITIS OF BOTH KNEES: ICD-10-CM

## 2018-03-22 DIAGNOSIS — S22.000A CLOSED COMPRESSION FRACTURE OF THORACIC VERTEBRA, INITIAL ENCOUNTER (HCC): ICD-10-CM

## 2018-03-22 PROCEDURE — 72128 CT CHEST SPINE W/O DYE: CPT | Performed by: EMERGENCY MEDICINE

## 2018-03-22 PROCEDURE — 70450 CT HEAD/BRAIN W/O DYE: CPT | Performed by: EMERGENCY MEDICINE

## 2018-03-22 PROCEDURE — 71045 X-RAY EXAM CHEST 1 VIEW: CPT | Performed by: EMERGENCY MEDICINE

## 2018-03-22 PROCEDURE — 99220 INITIAL OBSERVATION CARE,LEVL III: CPT | Performed by: HOSPITALIST

## 2018-03-22 PROCEDURE — 72125 CT NECK SPINE W/O DYE: CPT | Performed by: EMERGENCY MEDICINE

## 2018-03-22 PROCEDURE — 71260 CT THORAX DX C+: CPT | Performed by: EMERGENCY MEDICINE

## 2018-03-22 PROCEDURE — 72072 X-RAY EXAM THORAC SPINE 3VWS: CPT | Performed by: EMERGENCY MEDICINE

## 2018-03-22 RX ORDER — ACETAMINOPHEN 325 MG/1
650 TABLET ORAL EVERY 6 HOURS PRN
Status: DISCONTINUED | OUTPATIENT
Start: 2018-03-22 | End: 2018-03-26

## 2018-03-22 RX ORDER — FUROSEMIDE 20 MG/1
20 TABLET ORAL
Status: DISCONTINUED | OUTPATIENT
Start: 2018-03-23 | End: 2018-03-26

## 2018-03-22 RX ORDER — HYDROCODONE BITARTRATE AND ACETAMINOPHEN 5; 325 MG/1; MG/1
1 TABLET ORAL EVERY 4 HOURS PRN
Status: DISCONTINUED | OUTPATIENT
Start: 2018-03-22 | End: 2018-03-26

## 2018-03-22 RX ORDER — AMLODIPINE BESYLATE 2.5 MG/1
2.5 TABLET ORAL
Status: DISCONTINUED | OUTPATIENT
Start: 2018-03-23 | End: 2018-03-26

## 2018-03-22 RX ORDER — ACETAMINOPHEN 325 MG/1
650 TABLET ORAL EVERY 4 HOURS PRN
Status: DISCONTINUED | OUTPATIENT
Start: 2018-03-22 | End: 2018-03-26

## 2018-03-22 RX ORDER — PANTOPRAZOLE SODIUM 40 MG/1
40 TABLET, DELAYED RELEASE ORAL
Status: DISCONTINUED | OUTPATIENT
Start: 2018-03-23 | End: 2018-03-26

## 2018-03-22 RX ORDER — NITROGLYCERIN 40 MG/1
1 PATCH TRANSDERMAL
Status: DISCONTINUED | OUTPATIENT
Start: 2018-03-23 | End: 2018-03-26

## 2018-03-22 RX ORDER — NORTRIPTYLINE HYDROCHLORIDE 25 MG/1
25 CAPSULE ORAL NIGHTLY
Status: DISCONTINUED | OUTPATIENT
Start: 2018-03-22 | End: 2018-03-26

## 2018-03-22 RX ORDER — ASPIRIN 81 MG/1
81 TABLET, CHEWABLE ORAL DAILY
Status: DISCONTINUED | OUTPATIENT
Start: 2018-03-22 | End: 2018-03-26

## 2018-03-22 RX ORDER — METOPROLOL SUCCINATE 100 MG/1
100 TABLET, EXTENDED RELEASE ORAL
Status: DISCONTINUED | OUTPATIENT
Start: 2018-03-23 | End: 2018-03-26

## 2018-03-22 RX ORDER — CLOPIDOGREL BISULFATE 75 MG/1
75 TABLET ORAL
Status: DISCONTINUED | OUTPATIENT
Start: 2018-03-23 | End: 2018-03-26

## 2018-03-22 RX ORDER — NITROGLYCERIN 0.4 MG/1
0.4 TABLET SUBLINGUAL ONCE
Status: COMPLETED | OUTPATIENT
Start: 2018-03-22 | End: 2018-03-22

## 2018-03-22 RX ORDER — DEXTROSE MONOHYDRATE 25 G/50ML
50 INJECTION, SOLUTION INTRAVENOUS
Status: DISCONTINUED | OUTPATIENT
Start: 2018-03-22 | End: 2018-03-26

## 2018-03-22 RX ORDER — LEVOTHYROXINE SODIUM 0.1 MG/1
100 TABLET ORAL
Status: DISCONTINUED | OUTPATIENT
Start: 2018-03-23 | End: 2018-03-26

## 2018-03-22 RX ORDER — MONTELUKAST SODIUM 10 MG/1
10 TABLET ORAL
Status: DISCONTINUED | OUTPATIENT
Start: 2018-03-23 | End: 2018-03-26

## 2018-03-22 RX ORDER — HYDROCODONE BITARTRATE AND ACETAMINOPHEN 5; 325 MG/1; MG/1
2 TABLET ORAL EVERY 4 HOURS PRN
Status: DISCONTINUED | OUTPATIENT
Start: 2018-03-22 | End: 2018-03-26

## 2018-03-22 RX ORDER — ASPIRIN 81 MG/1
324 TABLET, CHEWABLE ORAL ONCE
Status: COMPLETED | OUTPATIENT
Start: 2018-03-22 | End: 2018-03-22

## 2018-03-22 RX ORDER — ATORVASTATIN CALCIUM 40 MG/1
40 TABLET, FILM COATED ORAL DAILY
Status: DISCONTINUED | OUTPATIENT
Start: 2018-03-22 | End: 2018-03-26

## 2018-03-22 NOTE — ED PROVIDER NOTES
Patient Seen in: [facility] Emergency Department    History   Patient presents with:  Trauma (cardiovascular, musculoskeletal)  Laceration Abrasion (integumentary)    Stated Complaint: fall, lac    HPI    80-year-old female presents for evaluation a • Other and unspecified hyperlipidemia 6/29/2012   • Other and unspecified noninfectious gastroenteritis and colitis 6/29/2012   • Other nonspecific findings on examination of blood(790.99) 4/8/2014   • PAD (peripheral artery disease) [facility] 4/8/2014   • KNEE REPLACEMENT SURGERY  9/16/2010: KNEE REPLACEMENT SURGERY      Comment: right  No date: LAMINECTOMY  age 25: ORAL SURGERY PROCEDURE      Comment: wisdom teeth  No date: OTHER SURGICAL HISTORY      Comment: carotid artery surgery  10/2011: OTHER SURGICA (14) - Abnormal; Notable for the following:        Result Value    Glucose 217 (*)     BUN 36 (*)     Creatinine 1.47 (*)     GFR, Non- 32 (*)     GFR, -American 36 (*)     Albumin 3.2 (*)     Sodium 134 (*)     Chloride 97 (*)     A vertebra, initial encounter ([facility]    Disposition:  There is no disposition on file for this visit. There is no disposition time on file for this visit. Follow-up:  No follow-up provider specified.       Medications Prescribed:  Current Discharge Medicat

## 2018-03-22 NOTE — ED INITIAL ASSESSMENT (HPI)
Patient arrives to ER via EMS s/p fall at home. Patient was standing, lost balance, walker slipped and patient fell backwards and hit head on TV stand.  approx 1 in lac to back of head, patient denies LOC, denies any pain or headache, denies any dizziness,

## 2018-03-23 ENCOUNTER — APPOINTMENT (OUTPATIENT)
Dept: MRI IMAGING | Facility: HOSPITAL | Age: 83
End: 2018-03-23
Attending: STUDENT IN AN ORGANIZED HEALTH CARE EDUCATION/TRAINING PROGRAM
Payer: MEDICARE

## 2018-03-23 ENCOUNTER — PRIOR ORIGINAL RECORDS (OUTPATIENT)
Dept: OTHER | Age: 83
End: 2018-03-23
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
## 2018-03-23 PROCEDURE — 72146 MRI CHEST SPINE W/O DYE: CPT | Performed by: STUDENT IN AN ORGANIZED HEALTH CARE EDUCATION/TRAINING PROGRAM

## 2018-03-23 PROCEDURE — 99225 SUBSEQUENT OBSERVATION CARE: CPT | Performed by: STUDENT IN AN ORGANIZED HEALTH CARE EDUCATION/TRAINING PROGRAM

## 2018-03-23 NOTE — OCCUPATIONAL THERAPY NOTE
Pt with newly found compression fracture in spine with MRI of spine now pending, OT will follow up with patient after MRI completed and Ortho Spine has seen patient to discuss POC and if any brace is needed.

## 2018-03-23 NOTE — PLAN OF CARE
Patient is alert and oriented, forgetful at times. [place]. On room air, NSR on the tele. Denies any CP or SOB. Patient is having back pain due to a fall. Bruising noted across upper back.  Patient very unsteady, two person assist. Patient is also very scared o

## 2018-03-23 NOTE — PROGRESS NOTES
Received patient from the ER SP fall. She hit her head on the TV after falling backwards. Patient has hx of falls and balance problems. Her left knee gives out according to her daughter. She lives with daughter and uses a walker.  She is alert and oriented

## 2018-03-23 NOTE — CONSULTS
[address]    PATIENT'S NAME: Rox Loving   ATTENDING PHYSICIAN: Charles Tafoya. Bhaskra Whipple MD   CONSULTING PHYSICIAN: Mariaa Dietz M.D.    PATIENT ACCOUNT#:   [de-identified]    LOCATION:  [address] [place] [address]  MEDICAL RECORD #:   [id number]       DATE OF BIRTH: interventions since, paroxysmal atrial fibrillation, seizure disorder, history of prior CVA, hypercholesterolemia, hypertension, hypothyroidism, peripheral arterial disease, diabetes.     MEDICATIONS:  Amlodipine, atorvastatin, Lasix, metoprolol, glimepirid

## 2018-03-23 NOTE — ED NOTES
PCT 8 CTU WITH REPORT GIVEN TO AND ACCEPTED BY BRITTANY BOO. PREPARED FOR TX TO 8619 PER CART VIA TRANSORT.

## 2018-03-23 NOTE — ED NOTES
RE-EVAL PER MD WITH DETAILED DISCUSSION ON BOTH LAB/RADIOGRAPHICS. QUESTIONS ANSWERED PER AMANDA ANGELO--TO BE ADMITTED.   IN AGREEMENT DWITH POC

## 2018-03-23 NOTE — H&P
[facility] HOSPITALIST  History and Physical     Maribethzafar Freed Patient Status:  Emergency    1929 MRN [id number]   Location [address] [facility] Attending Paola Thomas, [address] Day # 0 PCP Bernard Koo MD     Chief Comp injury     major   • Hypercholesterolemia 4/8/2014   • LBBB (left bundle branch block) 4/8/2014   • Leg swelling 9/15/2011    lower leg localized swelling   • Measles 9/16/2010   • Mitral valve disorders(424.0) 6/29/2012   • OA (osteoarthritis) 4/8/2014 Darnell Ellison MD;  Location: [address] [place] [address] ENDOSCOPY  7/15/2014: COLONOSCOPY      Comment: Procedure: COLONOSCOPY;  Surgeon: Viet Stone MD;  Location: [address] [place] [address] ENDOSCOPY  No date: [facility] CORONARY DRUG ELUTING STENT SINGLE      C Tab TAKE ONE TABLET BY MOUTH ONE TIME DAILY  Disp: 90 tablet Rfl: 1   UNKNOWN TO PATIENT Topical cancer tx Disp:  Rfl:    HYDROcodone-acetaminophen  MG Oral Tab Take 1 tablet by mouth every 6 (six) hours as needed.  Disp: 120 tablet Rfl: 0   AMLODIPIN Rfl: 2   hydrocortisone 1 % Apply Externally Cream Apply  topically every 4 (four) hours as needed. Disp:  Rfl:    Calcium-Vitamin D (CALTRATE 600 PLUS-VIT D OR) Take  by mouth daily.  Disp:  Rfl:    FLONASE 50 MCG/ACT NA SUSP 1-2 sprays bid prn Disp:  Rfl: hours. Recent Labs   Lab  03/22/18   1802   TROP  <0.046       Imaging: Imaging data reviewed in Epic. ASSESSMENT / PLAN:     1.  Chest pain: Given traumatic fall will check CT of chest, check serial cardiac enzymes, cardiology evaluation given hist

## 2018-03-23 NOTE — HOME CARE LIAISON
MET WITH PTNT TO DISCUSS HOME HEALTH SERVICES AND COVERAGE CRITERIA. PTNT AGREEABLE TO Rikki Enamorado. PTNT GIVEN RESIDENTIAL BROCHURE. RESIDENTIAL WITH PROVIDE SN/PT ON DISCHARGE.     Thank you for this referral,   Pavan Spencer

## 2018-03-23 NOTE — CONSULTS
[facility] Cardiology Consultation NotePrudy Ursula ANGELO    The patient was interviewed, examined, the chart was reviewed and the consult was dictated. This is a 80year old female with a chief complaint of fall .     Impression:  1.  mechanical fall with head lacer

## 2018-03-23 NOTE — CM/SW NOTE
03/23/18 1400   CM/SW Referral Data   Referral Source Physician   Reason for Referral Discharge planning   Informant Patient; Children   Social History   Recreational Drug/Alcohol Use no   Major Changes Last 6 Months no   Domestic/Partner Violence no   S

## 2018-03-24 PROCEDURE — 99225 SUBSEQUENT OBSERVATION CARE: CPT | Performed by: STUDENT IN AN ORGANIZED HEALTH CARE EDUCATION/TRAINING PROGRAM

## 2018-03-24 RX ORDER — SENNA AND DOCUSATE SODIUM 50; 8.6 MG/1; MG/1
2 TABLET, FILM COATED ORAL DAILY
Status: DISCONTINUED | OUTPATIENT
Start: 2018-03-24 | End: 2018-03-26

## 2018-03-24 NOTE — PROGRESS NOTES
[facility] Cardiology Progress Note - Tenzin Greene Patient Status:  Observation    1929 MRN [id number]   [facility] 8NE-A Attending Caleb Zapien MD   Hosp Day # 0 PCP Shoshana Farmer MD     Subjective:  Magui Mckeon knees     Uncontrolled secondary diabetes mellitus with stage 3 CKD (GFR 30-59) ([facility])     Hypercholesterolemia     Acute chest pain     Fall     Closed compression fracture of thoracic vertebra, initial encounter ([facility])      Objective:   Temp: 97.8 °F (36.6 Antibiotics       Hives    Medications:    Current Facility-Administered Medications:   AmLODIPine Besylate (NORVASC) tab 2.5 mg 2.5 mg Oral Daily   aspirin chewable tab 81 mg 81 mg Oral Daily   atorvastatin (LIPITOR) tab 40 mg 40 mg Oral Daily   Clopidogre

## 2018-03-24 NOTE — PROGRESS NOTES
[facility] HOSPITALIST  Progress Note     Phill Snellen Patient Status:  Observation    1929 MRN [id number]   [facility] 8NE-A Attending Shira Maurice MD   Hosp Day # 0 PCP Ashvin Bess MD     Chief Complaint: Chest pain     S: Patie Transdermal Daily   • Pantoprazole Sodium  40 mg Oral QAM AC   • Metoprolol Succinate ER  100 mg Oral Daily Beta Blocker   • Levothyroxine Sodium  100 mcg Oral QAM AC   • furosemide  20 mg Oral Daily   • Insulin Aspart Pen  1-10 Units Subcutaneous TID AC a

## 2018-03-24 NOTE — OCCUPATIONAL THERAPY NOTE
Per RN, pt was ordered to wear a TLSO brace which is still pending from [facility]. Will re-attempt to see pt for initial OT Eval once brace arrives and as schedule permits.  Thank you

## 2018-03-24 NOTE — PROGRESS NOTES
[facility] called- they will be out this afternoon to measure patient for TSLO brace and brace will be delivered in am (Sunday).

## 2018-03-25 ENCOUNTER — APPOINTMENT (OUTPATIENT)
Dept: GENERAL RADIOLOGY | Facility: HOSPITAL | Age: 83
End: 2018-03-25
Attending: NURSE PRACTITIONER
Payer: MEDICARE

## 2018-03-25 PROCEDURE — 99204 OFFICE O/P NEW MOD 45 MIN: CPT | Performed by: NEUROLOGICAL SURGERY

## 2018-03-25 PROCEDURE — 99225 SUBSEQUENT OBSERVATION CARE: CPT | Performed by: STUDENT IN AN ORGANIZED HEALTH CARE EDUCATION/TRAINING PROGRAM

## 2018-03-25 PROCEDURE — 72070 X-RAY EXAM THORAC SPINE 2VWS: CPT | Performed by: NURSE PRACTITIONER

## 2018-03-25 NOTE — PHYSICAL THERAPY NOTE
PHYSICAL THERAPY EVALUATION - INPATIENT     Room Number: 9957/9646-Q  Evaluation Date: 3/25/2018  Type of Evaluation: Initial  Physician Order: PT Eval and Treat    Presenting Problem: s/p fall with T1,T2,T3 and T8 compression fxs  Reason for Therapy • Cataract     left -- 2008 and right -- 2009   • Cellulitis and abscess of leg, except foot 6/29/2012   • Chronic sinusitis    • Congestive heart disease ([facility]    • Coronary atherosclerosis of unspecified type of vessel, native or graft 6/29/2012   • Co 2004  No date: ANGIOGRAM      Comment: 11/6/2007  No date: ANGIOPLASTY (CORONARY)      Comment: 3/19/2009  No date: ANGIOPLASTY (CORONARY)      Comment: 2/11/2011 11/1990: CABG      Comment: (x5), 5 vessel ([address])  10/17/1995: CAROTID ENDARTEREC walk    OBJECTIVE  Precautions: TLSO;Spine  Fall Risk: High fall risk    WEIGHT BEARING RESTRICTION  Weight Bearing Restriction: None                PAIN ASSESSMENT  Rating: Unable to rate  Location:  (\"all over my body\")  Management Techniques:  Activity Little   -   Climbing 3-5 steps with a railing?: Total       AM-PAC Score:  Raw Score: 12   PT Approx Degree of Impairment Score: 68.66%   Standardized Score (AM-PAC Scale): 35.33   CMS Modifier (G-Code): CL    FUNCTIONAL ABILITY STATUS  Gait Assessment admitted on 3/22/2018 for s/p fall with c/o back pain. Pt diagnosed with subacute to acute superior endplate compression deformities of T1-T2. Mild to moderate acute compression deformity of T3. T8 compressoin deformity is chronic in nature .   Pertinent c home with current situation. Furthermore,  pain is not well controlled during functional activities. Patient is not safe for d/c home today.     DISCHARGE RECOMMENDATIONS  PT Discharge Recommendations: Sub-acute rehabilitation (ELOS 12-14 days)    PLAN  PT

## 2018-03-25 NOTE — OCCUPATIONAL THERAPY NOTE
OCCUPATIONAL THERAPY EVALUATION - INPATIENT     Room Number: 3997/9621-Q  Evaluation Date: 3/25/2018  Type of Evaluation: Initial  Presenting Problem: T1, T2, T3 compression Fx    Physician Order: IP Consult to Occupational Therapy  Reason for Therapy: ADL • LBBB (left bundle branch block) 4/8/2014   • Leg swelling 9/15/2011    lower leg localized swelling   • Measles 9/16/2010   • Mitral valve disorders(424.0) 6/29/2012   • OA (osteoarthritis) 4/8/2014   • Open wound of foot except toe(s) alone, without m ENDOSCOPY  7/15/2014: COLONOSCOPY      Comment: Procedure: COLONOSCOPY;  Surgeon: Lynne Foss MD;  Location: [address] [place] [address] ENDOSCOPY  No date: [facility] CORONARY DRUG ELUTING STENT SINGLE      Comment: x2  No date: HIP REPLACEMENT SURGERY  7/1998, ASSESSMENT  Rating: Unable to rate  Location: back, groin  Management Techniques: Repositioning;Relaxation;Breathing techniques; Body mechanics; Activity promotion      RANGE OF MOTION AND STRENGTH ASSESSMENT  Upper extremity ROM is within functional limits shirt with cues to correctly motor sequence it for back safety. Pt required max A for sit to stand and min/mod A for ambulating from bed to door and back to chair. Pt fatigued when seated in chair. Patient End of Session: Up in chair; With [facility] staff;Needs m medical or therapy records    Specific performance deficits impacting engagement in ADL/IADL LOW  1 - 3 performance deficits    Client Assessment/Performance Deficits LOW - No comorbidities nor modifications of tasks    Clinical Decision Making LOW - Rajwinder

## 2018-03-25 NOTE — CM/SW NOTE
sw consult order noted for TIM vs acute rehab. Therapy recommending TIM at this time. PMR consult order obtained. ECIN referral made to [facility] to evaluated for acute rehab. sw to follow.  Per RN pt not yet ready for discharge today    Gerardo Degroot

## 2018-03-25 NOTE — PROGRESS NOTES
[facility] Cardiology Progress Note - Lenora Sloan Patient Status:  Observation    1929 MRN [id number]   [facility] 8NE-A Attending Alphonso Davila MD   Hosp Day # 0 PCP Claudio Sheth MD     Subjective:  She co hour   Intake              250 ml   Output                0 ml   Net              250 ml       Last 3 Weights  03/25/18 0440 : 158 lb 11.7 oz (72 kg)  03/24/18 0612 : 157 lb 10.1 oz (71.5 kg)  03/22/18 2225 : 158 lb (71.7 kg)  03/22/18 1544 : 170 lb (77.1 Montelukast Sodium (SINGULAIR) tab 10 mg 10 mg Oral Daily   Nortriptyline HCl (PAMELOR) cap 25 mg 25 mg Oral Nightly   nitroGLYCERIN (NITRODUR) 0.2 MG/HR patch 1 patch 1 patch Transdermal Daily   Pantoprazole Sodium (PROTONIX) EC tab 40 mg 40 mg Oral QAM

## 2018-03-25 NOTE — CONSULTS
800 11  Neurosurgery Consult    Maribeth Freed Patient Status:  Observation    1929 MRN [id number]   [facility] 8NE-A Attending Kelvin Hannah MD   Hosp Day # 0 PCP Bernard Koo MD     REASON FOR CONSULTATION:  Back pa 4/8/2014   • Open wound of foot except toe(s) alone, without mention of complication 7/39/9366    diabetic foot ulcer/L great toe   • Other and unspecified hyperlipidemia 6/29/2012   • Other and unspecified noninfectious gastroenteritis and colitis 6/29/20 SINGLE      Comment: x2  No date: HIP REPLACEMENT SURGERY  7/1998, 5/2000: HIP REPLACEMENT SURGERY      Comment: left side- 1998, right side- 2000  No date: KNEE REPLACEMENT SURGERY  9/16/2010: KNEE REPLACEMENT SURGERY      Comment: right  No date: [facility] 0900   glimepiride 4 MG Oral Tab TAKE 1 TABLET (4 MG TOTAL) BY MOUTH 2 TIMES DAILY. Disp: 180 tablet Rfl: 1 3/22/2018 at 1000   Levothyroxine Sodium 100 MCG Oral Tab Take 1 tablet (100 mcg total) by mouth once daily.  Disp: 90 tablet Rfl: 1 3/22/2018 at 090 nystatin (MYCOSTATIN) 616733 UNIT/GM Apply Externally Cream Apply to affected area two to three times per day Disp: 30 g Rfl: 2 More than a month at Unknown time   hydrocortisone 1 % Apply Externally Cream Apply  topically every 4 (four) hours as needed. Units Subcutaneous TID AC and HS       REVIEW OF SYSTEMS:  A 10-point system was reviewed. Pertinent positives and negatives are noted in HPI.       PHYSICAL EXAMINATION:  VITAL SIGNS: /49 (BP Location: Right arm)   Pulse 69   Temp 97.7 °F (36.5 °C) fractures- likely not acute  · TLSO brace- to be applied by [facility] today  · Brace to be worn whenever patient is up  · OK for brace to be off when patient is in bed or after she gets up to recliner  · Will likely need to wear brace for 8 weeks  · PT/OT to

## 2018-03-25 NOTE — PROGRESS NOTES
[facility] HOSPITALIST  Progress Note     Daryl Rollins Patient Status:  Observation    1929 MRN [id number]   [facility] 8NE-A Attending Tom Mcdonnell MD   Hosp Day # 0 PCP Mohini Zamora MD     Chief Complaint: Chest pain     S: Patie • nitroGLYCERIN  1 patch Transdermal Daily   • Pantoprazole Sodium  40 mg Oral QAM AC   • Metoprolol Succinate ER  100 mg Oral Daily Beta Blocker   • Levothyroxine Sodium  100 mcg Oral QAM AC   • furosemide  20 mg Oral Daily   • Insulin Aspart Pen  1-10

## 2018-03-26 VITALS
OXYGEN SATURATION: 91 % | BODY MASS INDEX: 24.06 KG/M2 | HEIGHT: 68 IN | RESPIRATION RATE: 18 BRPM | TEMPERATURE: 98 F | DIASTOLIC BLOOD PRESSURE: 43 MMHG | SYSTOLIC BLOOD PRESSURE: 133 MMHG | HEART RATE: 80 BPM | WEIGHT: 158.75 LBS

## 2018-03-26 PROCEDURE — 99225 SUBSEQUENT OBSERVATION CARE: CPT | Performed by: STUDENT IN AN ORGANIZED HEALTH CARE EDUCATION/TRAINING PROGRAM

## 2018-03-26 PROCEDURE — 99213 OFFICE O/P EST LOW 20 MIN: CPT | Performed by: NEUROLOGICAL SURGERY

## 2018-03-26 RX ORDER — HYDROCODONE BITARTRATE AND ACETAMINOPHEN 5; 325 MG/1; MG/1
1-2 TABLET ORAL EVERY 6 HOURS PRN
Qty: 16 TABLET | Refills: 0 | Status: SHIPPED | OUTPATIENT
Start: 2018-03-26 | End: 2018-04-12 | Stop reason: ALTCHOICE

## 2018-03-26 NOTE — PROGRESS NOTES
[facility] Cardiology Progress Note - Dawson Jeevanjaquelin Patient Status:  Observation    1929 MRN [id number]   [facility] 8NE-A Attending Adam Rasmussen MD   Hosp Day # 0 PCP Sharad Seymour MD     Subjective:  Mera Rios 158 lb 11.7 oz (72 kg)  03/24/18 0612 : 157 lb 10.1 oz (71.5 kg)  03/22/18 2225 : 158 lb (71.7 kg)  03/22/18 1544 : 170 lb (77.1 kg)  01/17/18 1917 : 160 lb (72.6 kg)  11/30/17 1344 : 148 lb (67.1 kg)      Tele: NSR    Physical Exam:    General: Alert and Oral Daily   Montelukast Sodium (SINGULAIR) tab 10 mg 10 mg Oral Daily   Nortriptyline HCl (PAMELOR) cap 25 mg 25 mg Oral Nightly   nitroGLYCERIN (NITRODUR) 0.2 MG/HR patch 1 patch 1 patch Transdermal Daily   Pantoprazole Sodium (PROTONIX) EC tab 40 mg 40

## 2018-03-26 NOTE — PROGRESS NOTES
[facility] HOSPITALIST  Progress Note     Ether Mediate Patient Status:  Observation    1929 MRN [id number]   [facility] 8NE-A Attending Michael Lo MD   Hosp Day # 0 PCP Newton Whitmore MD     Chief Complaint: Chest pain     S: Patie Daily   • Pantoprazole Sodium  40 mg Oral QAM AC   • Metoprolol Succinate ER  100 mg Oral Daily Beta Blocker   • Levothyroxine Sodium  100 mcg Oral QAM AC   • furosemide  20 mg Oral Daily       ASSESSMENT / PLAN:     1.  Thoracic vertebral fx due to fall  2

## 2018-03-26 NOTE — CONSULTS
[facility] Patient Status:  Observation    1929 MRN [id number]   [facility] 8NE-A Attending Kelvin Hannah MD   Hosp Day # 0 PCP Bernadr Koo MD     Patient Identification  Maribeth Freed is a 80year old functional impairments thus rehab consult requested she has midthoracic pain deep and aching moderate to severe and worse with activity but almost none at rest.  There is no radiation of her symptoms into her legs no numbness tingling weakness bowel or kailey great toe   • Other and unspecified hyperlipidemia 6/29/2012   • Other and unspecified noninfectious gastroenteritis and colitis 6/29/2012   • Other nonspecific findings on examination of blood(790.99) 4/8/2014   • P[facility]peripheral [facility] 4/8 date: KNEE REPLACEMENT SURGERY  9/16/2010: KNEE REPLACEMENT SURGERY      Comment: right  No date: LAMINECTOMY  age 25: ORAL SURGERY PROCEDURE      Comment: wisdom teeth  No date: OTHER SURGICAL HISTORY      Comment: carotid artery surgery  10/2011: OTHER S 50 mL 50 mL Intravenous Q15 Min PRN   Or      glucose (DEX4) oral liquid 30 g 30 g Oral Q15 Min PRN   Or      Glucose-Vitamin C (DEX-4) 4-0.006 g chewable tab 8 tablet 8 tablet Oral Q15 Min PRN   acetaminophen (TYLENOL) tab 650 mg 650 mg Oral Q6H PRN   ace tongue normal. Teeth and gums normal. Moist mucous membranes. Neck: No neck masses or thyroid enlargement/tenderness/nodules. Lungs:   Resonant, clear breath sounds, quiet accessory muscles. Chest wall:  No tenderness or deformity.    Cardiovasc equipment and assistive device needs. 24 hr rehab nursing also beneficial for medication management, pressure sore prevention, bowel and bladder management, skin management.      Physiatric medical oversight to monitor kidney function, cardiac fu

## 2018-03-26 NOTE — CM/SW NOTE
Spoke with liasteven Barry from [facility]--dr atkinson to be in to evaluate patient today between 1300 and 1330

## 2018-03-26 NOTE — PLAN OF CARE
CARDIOVASCULAR - ADULT    • Maintains optimal cardiac output and hemodynamic stability Progressing    • Absence of cardiac arrhythmias or at baseline Progressing    NSR w/ BBB on telemetry. VSS. No c/o cardiac symptoms.   In bed  TLSO brace at bedside, to

## 2018-03-26 NOTE — PROGRESS NOTES
[facility] Neurosurgical Progress Note    Ether Mediate Patient Status:  Observation    1929 MRN [id number]   [facility] 8NE-A Attending Michael Lo MD   Hosp Day # 0 PCP Newton Whitmore MD         Subjective:  Marika Reyes • Senna-Docusate Sodium  2 tablet Oral Daily   • AmLODIPine Besylate  2.5 mg Oral Daily   • aspirin  81 mg Oral Daily   • atorvastatin  40 mg Oral Daily   • Clopidogrel Bisulfate  75 mg Oral Daily   • Montelukast Sodium  10 mg Oral Daily   • Nortriptylin

## 2018-03-26 NOTE — OCCUPATIONAL THERAPY NOTE
OCCUPATIONAL THERAPY TREATMENT NOTE - INPATIENT     Room Number: 2289/4834-W  Session: 1  Number of Visits to Meet Established Goals: 5    Presenting Problem: T1, T2, T3 compression Fx    History related to current admission: Caesar stokes(n) 88 ye Measles 9/16/2010   • Mitral valve disorders(424.0) 6/29/2012   • OA (osteoarthritis) 4/8/2014   • Open wound of foot except toe(s) alone, without mention of complication 4/71/9875    diabetic foot ulcer/L great toe   • Other and unspecified hyperlipidemia Karma Au MD;  Location: [facility] ENDOSCOPY  No date:  PLMT CORONARY DRUG ELUTING STENT SINGLE      Comment: x2  No date: HIP REPLACEMENT SURGERY  7/1998, 5/2000: HIP REPLACEMENT SURGERY      Comment: left side- 1998, right side- 2000  No date: [address] [place] [address] assistance  Sit to Stand: Minimum assistance    Skilled Therapy Provided: Pt presented up in B S Chair, daughter present.  Extended time spent on psychosocial discussion regarding current vs Previous level of function and how to adapt activities based on da safely to her prior level of function.     OT Discharge Recommendations: 24 hour care/supervision;Home with home health PT/OT  OT Device Recommendations: Reacher;Hospital bed    PLAN               OT Goals:   ADL Goals   Patient will perform upper body dres

## 2018-03-26 NOTE — CM/SW NOTE
Daughter also interested in referral sent to [facility] ([phone])--would need to submit clinical data--respite rates range from $260 to 340. oo per day

## 2018-03-26 NOTE — PHYSICAL THERAPY NOTE
PHYSICAL THERAPY TREATMENT NOTE - INPATIENT    Room Number: 6149/5448-J     Session: 1   Number of Visits to Meet Established Goals: 5    Presenting Problem: s/p fall with T1,T2,T3 and T8 compression fxs     Pt is 80year old female admitted on 3/22/2018 6/29/2012   • Chronic sinusitis    • Congestive heart disease ([facility]    • Coronary atherosclerosis of unspecified type of vessel, native or graft 6/29/2012   • Cough due to ACE inhibitor 4/8/2014   • CVA (cerebral infarction) 4/8/2014   • Diabetes mellitus 3/19/2009  No date: ANGIOPLASTY (CORONARY)      Comment: 2/11/2011 11/1990: CABG      Comment: (x5), 5 vessel [facility])  10/17/1995: CAROTID ENDARTERECTOMY      Comment: left  02/28/1996: CAROTID ENDARTERECTOMY      Comment: rt  2009: CATARACT Standing: Poor    ACTIVITY TOLERANCE  O2 Saturation: >90%  Room air  No shortness of breath    AM-PAC '6-Clicks' INPATIENT SHORT FORM - BASIC MOBILITY  How much difficulty does the patient currently have. ..  -   Turning over in bed (including adjusting bed position, and TLSO management. Pt and Pt's daughter educated on spine mechanics, spinal precautions and ankle pumps. Pt with call light in reach. RN informed of session. Mobility Guidelines updated in Pt room for nursing staff.        THERAPEUTIC minimum assistance - met 3/26/2018       Goal #2 Patient is able to demonstrate transfers Sit to/from Stand at assistance level: minimum assistance - met 3/26/2018       Goal #3 Patient is able to ambulate 25 feet with assist device: walker - rolling at as

## 2018-03-26 NOTE — CM/SW NOTE
[facility]   Patient assigned to room 134--nurse to call report [phone]--requesting Clemente Vincent  at 1900--francine for let (stretcher for wheelchair)

## 2018-03-27 ENCOUNTER — NURSE ONLY (OUTPATIENT)
Dept: LAB | Age: 83
End: 2018-03-27
Attending: NURSE PRACTITIONER
Payer: MEDICARE

## 2018-03-27 ENCOUNTER — SNF VISIT (OUTPATIENT)
Dept: INTERNAL MEDICINE CLINIC | Age: 83
End: 2018-03-27

## 2018-03-27 VITALS
OXYGEN SATURATION: 94 % | RESPIRATION RATE: 16 BRPM | SYSTOLIC BLOOD PRESSURE: 128 MMHG | HEART RATE: 61 BPM | DIASTOLIC BLOOD PRESSURE: 64 MMHG | TEMPERATURE: 97 F

## 2018-03-27 DIAGNOSIS — E08.40 DIABETES MELLITUS DUE TO UNDERLYING CONDITION WITH DIABETIC NEUROPATHY, WITHOUT LONG-TERM CURRENT USE OF INSULIN (HCC): ICD-10-CM
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
## 2018-03-27 DIAGNOSIS — L30.9 DERMATITIS: ICD-10-CM

## 2018-03-27 DIAGNOSIS — M17.0 PRIMARY OSTEOARTHRITIS OF BOTH KNEES: Primary | ICD-10-CM
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
## 2018-03-27 DIAGNOSIS — I73.9 PAD (PERIPHERAL ARTERY DISEASE) (HCC): ICD-10-CM

## 2018-03-27 DIAGNOSIS — E78.2 HYPERLIPEMIA, MIXED: ICD-10-CM

## 2018-03-27 DIAGNOSIS — I10 ESSENTIAL HYPERTENSION WITH GOAL BLOOD PRESSURE LESS THAN 130/80: ICD-10-CM

## 2018-03-27 LAB
25-HYDROXYVITAMIN D (TOTAL): 38.7 NG/ML (ref 30–100)
ALBUMIN SERPL-MCNC: 2.6 G/DL (ref 3.5–4.8)
ALP LIVER SERPL-CCNC: 103 U/L (ref 55–142)
ALT SERPL-CCNC: 22 U/L (ref 14–54)
AST SERPL-CCNC: 19 U/L (ref 15–41)
BILIRUB SERPL-MCNC: 0.5 MG/DL (ref 0.1–2)
BUN BLD-MCNC: 44 MG/DL (ref 8–20)
CALCIUM BLD-MCNC: 8.7 MG/DL (ref 8.3–10.3)
CHLORIDE: 103 MMOL/L (ref 101–111)
CO2: 29 MMOL/L (ref 22–32)
CREAT BLD-MCNC: 1.59 MG/DL (ref 0.55–1.02)
ERYTHROCYTE [DISTWIDTH] IN BLOOD BY AUTOMATED COUNT: 13.6 % (ref 11.5–16)
GLUCOSE BLD-MCNC: 179 MG/DL (ref 70–99)
HAV IGM SER QL: 2.4 MG/DL (ref 1.7–3)
HCT VFR BLD AUTO: 36.5 % (ref 34–50)
HGB BLD-MCNC: 11.7 G/DL (ref 12–16)
M PROTEIN MFR SERPL ELPH: 6.5 G/DL (ref 6.1–8.3)
MCH RBC QN AUTO: 30.5 PG (ref 27–33.2)
MCHC RBC AUTO-ENTMCNC: 32.1 G/DL (ref 31–37)
MCV RBC AUTO: 95.3 FL (ref 81–100)
PLATELET # BLD AUTO: 253 10(3)UL (ref 150–450)
POTASSIUM SERPL-SCNC: 4.5 MMOL/L (ref 3.6–5.1)
RBC # BLD AUTO: 3.83 X10(6)UL (ref 3.8–5.1)
RED CELL DISTRIBUTION WIDTH-SD: 47.4 FL (ref 35.1–46.3)
SODIUM SERPL-SCNC: 138 MMOL/L (ref 136–144)
WBC # BLD AUTO: 9.8 X10(3) UL (ref 4–13)

## 2018-03-27 PROCEDURE — 83735 ASSAY OF MAGNESIUM: CPT

## 2018-03-27 PROCEDURE — 85027 COMPLETE CBC AUTOMATED: CPT

## 2018-03-27 PROCEDURE — 80053 COMPREHEN METABOLIC PANEL: CPT

## 2018-03-27 PROCEDURE — 99309 SBSQ NF CARE MODERATE MDM 30: CPT | Performed by: NURSE PRACTITIONER

## 2018-03-27 PROCEDURE — 82306 VITAMIN D 25 HYDROXY: CPT

## 2018-03-27 NOTE — DISCHARGE SUMMARY
[facility] HOSPITALIST  DISCHARGE SUMMARY     Manuel Interiano Patient Status:  Observation    1929 MRN [id number]   [facility] 8NE-A Attending No att. providers found   Hosp Day # 0 PCP Maine Iqbal MD     Date of Admission: 3/22/2018 hitting the front of her chest.  She does however complain of some neck pain.       Brief Synopsis:   The pt was admitted to the hospital, found to have acute/ subacute Thoracic fractures and was evaluated by neurosurgery.  She was placed / fitted in TLSO b total) by mouth daily. Quantity:  90 tablet  Refills:  3     GÓMEZ CONTOUR NEXT TEST Strp      TEST ONCE DAILY   Quantity:  100 strip  Refills:  3     CALTRATE 600 PLUS-VIT D OR      Take  by mouth daily.    Refills:  0     Clopidogrel Bisulfate 75 MG Tab affected area two to three times per day   Quantity:  30 g  Refills:  2     penicillin v potassium 250 MG Tabs  Commonly known as:  VEETID      Take 250 mg by mouth daily.    Refills:  2     SSD 1 % Crea  Generic drug:  silver sulfADIAZINE      Apply to phylicia

## 2018-03-27 NOTE — PROGRESS NOTES
Sergio Meter  : 1929  Age 80year old  female patient is admitted to Facility: The [address] [place] [address] for rehabilitation and medical management    [address] [place] [address] Drive date:  3/22/18  Discharge date to [facility]:  3/25/18  ELOS:  12-14 days 2008 and right -- 2009   • Cellulitis and abscess of leg, except foot 6/29/2012   • Chronic sinusitis    • Congestive heart disease ([facility]    • Coronary atherosclerosis of unspecified type of vessel, native or graft 6/29/2012   • Cough due to ACE inhibitor date: ANGIOPLASTY (CORONARY)      Comment: 3/19/2009  No date: ANGIOPLASTY (CORONARY)      Comment: 2/11/2011 11/1990: CABG      Comment: (x5), 5 vessel [facility])  10/17/1995: CAROTID ENDARTERECTOMY      Comment: left  02/28/1996: CAROTID ENDARTER Tape           Rash  Latex                   Rash  Sulfa Antibiotics       Hives    CODE STATUS:  Full Code    ADVANCED CARE PLANNING TEAM: None      CURRENT MEDICATIONS     Current Outpatient Prescriptions:  HYDROcodone-acetaminophen 5-325 MG Oral Tab [place] daily.   Disp:  Rfl: 2   SSD 1 % Apply Externally Cream Apply to rash as needed Disp:  Rfl: 3   aspirin 81 MG Oral Tab Take 81 mg by mouth daily.  Disp:  Rfl:    nystatin (MYCOSTATIN) 295800 UNIT/GM Apply Externally Cream Apply to affected area two to three normal; there is no nystagmus, no drainage from eyes  HENT: normocephalic; normal nose, no nasal drainage, mucous membranes pink, moist, pharynx no exudate, +cerumen in both ears  NECK: supple; FROM; no JVD, no TMG, no carotid bruits  BREAST: ---deferred 25-HYDROXY Latest Ref Range: 30.0 - 100.0 ng/mL 38.7   WBC Latest Ref Range: 4.0 - 13.0 x10(3) uL 9.8   Hemoglobin Latest Ref Range: 12.0 - 16.0 g/dL 11.7 (L)   Hematocrit Latest Ref Range: 34.0 - 50.0 % 36.5   Platelet Count Latest Ref Range: 150.0 - 450. bid    Gout  1. Monitor    H/O leg swelling/PAD/PVD/Polyneuropathy/Venous Insufficiency  1. Monitor   2. Elevate legs while in w/c  3. Elevate legs on pillows while in bed  4. Nortriptyline 25 mg qhs  5.  Penicillin v potassium 250 mg qd for chronic knee in

## 2018-03-28 ENCOUNTER — LAB REQUISITION (OUTPATIENT)
Dept: LAB | Facility: HOSPITAL | Age: 83
End: 2018-03-28
Attending: NURSE PRACTITIONER
Payer: MEDICARE

## 2018-03-28 DIAGNOSIS — I10 ESSENTIAL (PRIMARY) HYPERTENSION: ICD-10-CM

## 2018-03-28 DIAGNOSIS — E11.9 TYPE 2 DIABETES MELLITUS WITHOUT COMPLICATIONS (HCC): ICD-10-CM

## 2018-03-28 PROCEDURE — 87186 SC STD MICRODIL/AGAR DIL: CPT | Performed by: NURSE PRACTITIONER

## 2018-03-28 PROCEDURE — 87088 URINE BACTERIA CULTURE: CPT | Performed by: NURSE PRACTITIONER

## 2018-03-28 PROCEDURE — 87086 URINE CULTURE/COLONY COUNT: CPT | Performed by: NURSE PRACTITIONER

## 2018-03-28 PROCEDURE — 81001 URINALYSIS AUTO W/SCOPE: CPT | Performed by: NURSE PRACTITIONER

## 2018-03-28 PROCEDURE — 87184 SC STD DISK METHOD PER PLATE: CPT | Performed by: NURSE PRACTITIONER

## 2018-03-29 ENCOUNTER — SNF VISIT (OUTPATIENT)
Dept: INTERNAL MEDICINE CLINIC | Age: 83
End: 2018-03-29

## 2018-03-29 DIAGNOSIS — N30.00 ACUTE CYSTITIS WITHOUT HEMATURIA: ICD-10-CM

## 2018-03-29 PROCEDURE — 99309 SBSQ NF CARE MODERATE MDM 30: CPT | Performed by: NURSE PRACTITIONER

## 2018-03-29 NOTE — PROGRESS NOTES
Rhonda Castellon, 11/20/1929, 80year old, female    Chief Complaint:  Patient presents with:  UTI     [facility] Admit date:  3/22/18  Discharge date to [facility]:  3/25/18  ELOS:  12-14 days  Anticipated discharge date: 4/6/18    Subjective:  Previous com murmur  ABDOMEN:  normal active BS+, soft, nondistended; no organomegaly, no masses; no bruits; nontender, no guarding, no rebound tenderness.   :  MUSCULOSKELETAL: no acute synovitis upper or lower extremity TLSO brace in place   EXTREMITIES/VASCULAR:no

## 2018-03-30 ENCOUNTER — SNF VISIT (OUTPATIENT)
Dept: INTERNAL MEDICINE CLINIC | Age: 83
End: 2018-03-30

## 2018-03-30 VITALS
HEART RATE: 61 BPM | DIASTOLIC BLOOD PRESSURE: 61 MMHG | TEMPERATURE: 97 F | SYSTOLIC BLOOD PRESSURE: 112 MMHG | RESPIRATION RATE: 19 BRPM

## 2018-03-30 DIAGNOSIS — N30.00 ACUTE CYSTITIS WITHOUT HEMATURIA: ICD-10-CM

## 2018-03-30 PROCEDURE — 99309 SBSQ NF CARE MODERATE MDM 30: CPT | Performed by: NURSE PRACTITIONER

## 2018-03-30 NOTE — PROGRESS NOTES
Pilar Willett, 11/20/1929, 80year old, female    Chief Complaint:  Patient presents with:  UTI     [facility] Admit date:  3/22/18  Discharge date to [facility]:  3/25/18  ELOS:  12-14 days  Anticipated discharge date: 4/6/18    Subjective:  Previous com , no click, no murmur  ABDOMEN:  normal active BS+, soft, nondistended; no organomegaly, no masses; no bruits; nontender, no guarding, no rebound tenderness.   :  MUSCULOSKELETAL: no acute synovitis upper or lower extremity TLSO brace in place   EXTREMITI Reference Range Flag   Urine Culture >100,000 CFU/ML Proteus mirabilis             Assessment and plan:  UTI    Will initiate macrobid bid x 7 days until culture is available. May change abx at that time. Will switch to Cipro 500 mg PO bid x 10 days.

## 2018-03-31 ENCOUNTER — SNF ADMIT/H&P (OUTPATIENT)
Dept: FAMILY MEDICINE CLINIC | Facility: CLINIC | Age: 83
End: 2018-03-31

## 2018-03-31 DIAGNOSIS — E13.22 UNCONTROLLED SECONDARY DIABETES MELLITUS WITH STAGE 3 CKD (GFR 30-59) (HCC): ICD-10-CM

## 2018-03-31 DIAGNOSIS — L97.522 DIABETIC ULCER OF TOE OF LEFT FOOT ASSOCIATED WITH TYPE 2 DIABETES MELLITUS, WITH FAT LAYER EXPOSED (HCC): ICD-10-CM

## 2018-03-31 DIAGNOSIS — Z86.73 HISTORY OF STROKE: ICD-10-CM

## 2018-03-31 DIAGNOSIS — N18.30 UNCONTROLLED SECONDARY DIABETES MELLITUS WITH STAGE 3 CKD (GFR 30-59) (HCC): ICD-10-CM

## 2018-03-31 DIAGNOSIS — I48.20 CHRONIC ATRIAL FIBRILLATION (HCC): ICD-10-CM

## 2018-03-31 DIAGNOSIS — I25.10 ATHEROSCLEROSIS OF NATIVE CORONARY ARTERY OF NATIVE HEART WITHOUT ANGINA PECTORIS: ICD-10-CM

## 2018-03-31 DIAGNOSIS — M17.0 PRIMARY OSTEOARTHRITIS OF BOTH KNEES: ICD-10-CM

## 2018-03-31 DIAGNOSIS — E13.65 UNCONTROLLED SECONDARY DIABETES MELLITUS WITH STAGE 3 CKD (GFR 30-59) (HCC): ICD-10-CM

## 2018-03-31 DIAGNOSIS — Z91.81 AT HIGH RISK FOR INJURY RELATED TO FALL: ICD-10-CM

## 2018-03-31 DIAGNOSIS — E78.00 HYPERCHOLESTEROLEMIA: ICD-10-CM

## 2018-03-31 DIAGNOSIS — K21.9 GASTROESOPHAGEAL REFLUX DISEASE WITHOUT ESOPHAGITIS: ICD-10-CM

## 2018-03-31 DIAGNOSIS — I10 ESSENTIAL HYPERTENSION WITH GOAL BLOOD PRESSURE LESS THAN 130/80: ICD-10-CM

## 2018-03-31 DIAGNOSIS — R53.81 PHYSICAL DECONDITIONING: ICD-10-CM

## 2018-03-31 DIAGNOSIS — E11.40 PAINFUL DIABETIC NEUROPATHY (HCC): ICD-10-CM

## 2018-03-31 DIAGNOSIS — I73.9 PAD (PERIPHERAL ARTERY DISEASE) (HCC): ICD-10-CM

## 2018-03-31 DIAGNOSIS — R53.1 GENERALIZED WEAKNESS: ICD-10-CM

## 2018-03-31 DIAGNOSIS — E03.4 HYPOTHYROIDISM DUE TO ACQUIRED ATROPHY OF THYROID: ICD-10-CM

## 2018-03-31 DIAGNOSIS — E11.621 DIABETIC ULCER OF TOE OF LEFT FOOT ASSOCIATED WITH TYPE 2 DIABETES MELLITUS, WITH FAT LAYER EXPOSED (HCC): ICD-10-CM

## 2018-03-31 DIAGNOSIS — S22.000A CLOSED COMPRESSION FRACTURE OF THORACIC VERTEBRA, INITIAL ENCOUNTER (HCC): Primary | ICD-10-CM

## 2018-03-31 PROCEDURE — 99306 1ST NF CARE HIGH MDM 50: CPT | Performed by: FAMILY MEDICINE

## 2018-04-01 NOTE — PROGRESS NOTES
[place] Author: Florentino Mckenzie MD     1929 MRN [id number]   [facility]  Admission 3/22/18      Last Hospital Discharge 3/26/18 PCP Romi Carranza MD   Hospital of Discharge 3/26/18       Date of Admission: 3 (Prevna or Pneumovax)   Orders placed or performed in visit on 01/15/16  -PNEUMOCOCCAL VACC, 13 NANNETTE IM        HPI:    Cinthia Mccoy is a 80year old female admitted to SNF for sub-acute rehabilitation.       Chief Complaint: Patient presents with:  Raya Henley Take 1 tablet (20 mg total) by mouth once daily.    Metoprolol Succinate  MG Oral Tablet 24 Hr TAKE ONE TABLET EVERY DAY   The [facility] CONTOUR NEXT TEST In Vitro Strip TEST ONCE DAILY   Fluocinonide 0.05 % External Ointment apply to affected twice daily   g ([facility] (9/26/2013); Esophageal reflux (6/29/2012); Gout (9/16/2010); Gout; H/O head injury; Hypercholesterolemia (4/8/2014); LBBB (left bundle branch block) (4/8/2014); Leg swelling (9/15/2011); Measles (9/16/2010);  Mitral valve disorders(424.0) (6/29/2012) and angioplasty (coronary).     She family history includes Diabetes in her father; Heart Disease in an other family member; Stroke in her sister; alzheimer's disease in her mother; colon polyps in an other family member; heart failure in her father and sis tenderness, pain and spasm. Lymphadenopathy:     She has no cervical adenopathy. Neurological: She is alert and oriented to person, place, and time. She has normal reflexes. She displays normal reflexes. No cranial nerve deficit.  She exhibits normal mu TECHNIQUE:  CT images were obtained with non-ionic intravenous contrast material. Dose reduction techniques were used.  Dose information is transmitted to the [facility] ([facility]) Aster Ingram [address] ([address]) which includes the [facility] better characterization if clinically indicated. Fibrotic changes are present within the lungs. There are also scattered pulmonary micro nodules  measuring up to 7 mm.   According to Fleischner guidelines, solid nodules measuring between 6-8 mm in low ris within the right middle ear cavity. Clinical correlation is suggested. SKULL:             No evidence for fracture or osseous abnormality. OTHER:             None. CONCLUSION:  No acute intracranial abnormality is seen.   There is complete opacificati the right C3-4 facet joint and along the left C2-C4 facets. There is mild reversal of the normal cervical lordosis. There is anterolisthesis of C3 on C4 by 4 mm. Cervical vertebral body heights are well-maintained.   There is moderate degenerative disc s consciousness. Complains of upper back pain. FINDINGS:   There is normal thoracic kyphosis. There is no significant spondylolisthesis. There is a compression fracture involving the T8 vertebral body.   Loss of height in this region measures approximat Patient lost balance and fell backwards. Complains of upper back pain. FINDINGS:   Mild exaggerated kyphosis of the thoracic spine is noted. T8 minor compression deformity is chronic in nature.   Subacute to acute superior endplate compression fractures fracture of T8 which are noted to be at least partially acute on the recent MRI of 3/23/2018. There is no significant malalignment.     Dictated by: Jade Mariee MD on 3/25/2018 at 16:17     Approved by: Jade Mariee MD            Xr Chest Ap Portable  ( ([facility]  -novolog, SS, oral glimepiride    6. Primary osteoarthritis of both knees  -stable, PT/OT    7. Painful diabetic neuropathy (HCC)  -as above, TIM, and diabetic medication as above.      8. Diabetic ulcer of toe of left foot associated with type 2 miguel

## 2018-04-02 ENCOUNTER — PRIOR ORIGINAL RECORDS (OUTPATIENT)
Dept: OTHER | Age: 83
End: 2018-04-02

## 2018-04-02 ENCOUNTER — NURSE ONLY (OUTPATIENT)
Dept: LAB | Age: 83
End: 2018-04-02
Attending: NURSE PRACTITIONER
Payer: MEDICARE

## 2018-04-02 DIAGNOSIS — I10 HTN (HYPERTENSION): ICD-10-CM

## 2018-04-02 DIAGNOSIS — E11.9 DIABETES MELLITUS (HCC): Primary | ICD-10-CM

## 2018-04-02 PROCEDURE — 85025 COMPLETE CBC W/AUTO DIFF WBC: CPT

## 2018-04-02 PROCEDURE — 80053 COMPREHEN METABOLIC PANEL: CPT

## 2018-04-05 ENCOUNTER — SNF DISCHARGE (OUTPATIENT)
Dept: FAMILY MEDICINE CLINIC | Facility: CLINIC | Age: 83
End: 2018-04-05

## 2018-04-05 DIAGNOSIS — I25.10 ATHEROSCLEROSIS OF NATIVE CORONARY ARTERY OF NATIVE HEART WITHOUT ANGINA PECTORIS: ICD-10-CM

## 2018-04-05 DIAGNOSIS — E11.40 PAINFUL DIABETIC NEUROPATHY (HCC): ICD-10-CM

## 2018-04-05 DIAGNOSIS — R53.1 GENERALIZED WEAKNESS: ICD-10-CM

## 2018-04-05 DIAGNOSIS — R53.81 PHYSICAL DECONDITIONING: ICD-10-CM

## 2018-04-05 DIAGNOSIS — Z91.81 AT HIGH RISK FOR INJURY RELATED TO FALL: ICD-10-CM

## 2018-04-05 DIAGNOSIS — I10 ESSENTIAL HYPERTENSION WITH GOAL BLOOD PRESSURE LESS THAN 130/80: ICD-10-CM

## 2018-04-05 DIAGNOSIS — E03.4 HYPOTHYROIDISM DUE TO ACQUIRED ATROPHY OF THYROID: ICD-10-CM

## 2018-04-05 DIAGNOSIS — Z86.73 HISTORY OF STROKE: ICD-10-CM

## 2018-04-05 DIAGNOSIS — I73.9 PAD (PERIPHERAL ARTERY DISEASE) (HCC): ICD-10-CM

## 2018-04-05 DIAGNOSIS — S22.000D CLOSED COMPRESSION FRACTURE OF THORACIC VERTEBRA WITH ROUTINE HEALING, SUBSEQUENT ENCOUNTER: Primary | ICD-10-CM

## 2018-04-05 DIAGNOSIS — I48.0 PAROXYSMAL ATRIAL FIBRILLATION (HCC): ICD-10-CM

## 2018-04-05 DIAGNOSIS — M15.9 PRIMARY OSTEOARTHRITIS INVOLVING MULTIPLE JOINTS: ICD-10-CM

## 2018-04-05 DIAGNOSIS — E13.65: ICD-10-CM

## 2018-04-05 DIAGNOSIS — K21.9 GASTROESOPHAGEAL REFLUX DISEASE WITHOUT ESOPHAGITIS: ICD-10-CM

## 2018-04-05 PROCEDURE — 99315 NF DSCHRG MGMT 30 MIN/LESS: CPT | Performed by: FAMILY MEDICINE

## 2018-04-09 ENCOUNTER — TELEPHONE (OUTPATIENT)
Dept: INTERNAL MEDICINE CLINIC | Facility: CLINIC | Age: 83
End: 2018-04-09

## 2018-04-09 NOTE — TELEPHONE ENCOUNTER
Spoke with daughter pt has f/u appt on 4/12 and she is inquiring if she needs labs for diabetes. daughter informed A1C done 3/22/18 no need to repeat. She expressed understanding.

## 2018-04-11 ENCOUNTER — MED REC SCAN ONLY (OUTPATIENT)
Dept: INTERNAL MEDICINE CLINIC | Facility: CLINIC | Age: 83
End: 2018-04-11

## 2018-04-12 ENCOUNTER — OFFICE VISIT (OUTPATIENT)
Dept: INTERNAL MEDICINE CLINIC | Facility: CLINIC | Age: 83
End: 2018-04-12

## 2018-04-12 VITALS
SYSTOLIC BLOOD PRESSURE: 110 MMHG | HEART RATE: 74 BPM | TEMPERATURE: 98 F | OXYGEN SATURATION: 95 % | RESPIRATION RATE: 16 BRPM | DIASTOLIC BLOOD PRESSURE: 62 MMHG

## 2018-04-12 DIAGNOSIS — Z86.73 HISTORY OF STROKE: ICD-10-CM

## 2018-04-12 DIAGNOSIS — M17.0 PRIMARY OSTEOARTHRITIS OF BOTH KNEES: ICD-10-CM

## 2018-04-12 DIAGNOSIS — E11.40 PAINFUL DIABETIC NEUROPATHY (HCC): ICD-10-CM

## 2018-04-12 DIAGNOSIS — I73.9 PAD (PERIPHERAL ARTERY DISEASE) (HCC): ICD-10-CM

## 2018-04-12 DIAGNOSIS — E13.65 UNCONTROLLED SECONDARY DIABETES MELLITUS WITH STAGE 3 CKD (GFR 30-59) (HCC): ICD-10-CM

## 2018-04-12 DIAGNOSIS — E13.22 UNCONTROLLED SECONDARY DIABETES MELLITUS WITH STAGE 3 CKD (GFR 30-59) (HCC): ICD-10-CM

## 2018-04-12 DIAGNOSIS — N18.30 UNCONTROLLED SECONDARY DIABETES MELLITUS WITH STAGE 3 CKD (GFR 30-59) (HCC): ICD-10-CM

## 2018-04-12 DIAGNOSIS — Z00.00 ENCOUNTER FOR ANNUAL HEALTH EXAMINATION: ICD-10-CM

## 2018-04-12 DIAGNOSIS — I48.0 PAROXYSMAL ATRIAL FIBRILLATION (HCC): ICD-10-CM

## 2018-04-12 DIAGNOSIS — I10 ESSENTIAL HYPERTENSION WITH GOAL BLOOD PRESSURE LESS THAN 130/80: ICD-10-CM

## 2018-04-12 DIAGNOSIS — R56.9 SEIZURE (HCC): ICD-10-CM

## 2018-04-12 DIAGNOSIS — I25.10 ATHEROSCLEROSIS OF NATIVE CORONARY ARTERY OF NATIVE HEART WITHOUT ANGINA PECTORIS: ICD-10-CM

## 2018-04-12 DIAGNOSIS — B37.0 THRUSH, ORAL: ICD-10-CM

## 2018-04-12 DIAGNOSIS — Z00.00 PHYSICAL EXAM, ANNUAL: Primary | ICD-10-CM

## 2018-04-12 DIAGNOSIS — E03.4 HYPOTHYROIDISM DUE TO ACQUIRED ATROPHY OF THYROID: ICD-10-CM

## 2018-04-12 DIAGNOSIS — K21.9 GASTROESOPHAGEAL REFLUX DISEASE WITHOUT ESOPHAGITIS: ICD-10-CM

## 2018-04-12 DIAGNOSIS — E78.2 HYPERLIPEMIA, MIXED: ICD-10-CM
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
## 2018-04-12 PROBLEM — L97.522 DIABETIC ULCER OF TOE OF LEFT FOOT ASSOCIATED WITH TYPE 2 DIABETES MELLITUS, WITH FAT LAYER EXPOSED (HCC): Status: RESOLVED | Noted: 2017-04-10 | Resolved: 2018-04-12

## 2018-04-12 PROBLEM — S22.000A CLOSED COMPRESSION FRACTURE OF THORACIC VERTEBRA, INITIAL ENCOUNTER (HCC): Status: RESOLVED | Noted: 2018-03-22 | Resolved: 2018-04-12

## 2018-04-12 PROBLEM — R07.9 ACUTE CHEST PAIN: Status: RESOLVED | Noted: 2018-03-22 | Resolved: 2018-04-12

## 2018-04-12 PROBLEM — E78.00 HYPERCHOLESTEROLEMIA: Status: RESOLVED | Noted: 2017-05-31 | Resolved: 2018-04-12

## 2018-04-12 PROBLEM — E11.621 DIABETIC ULCER OF TOE OF LEFT FOOT ASSOCIATED WITH TYPE 2 DIABETES MELLITUS, WITH FAT LAYER EXPOSED (HCC): Status: RESOLVED | Noted: 2017-04-10 | Resolved: 2018-04-12

## 2018-04-12 PROCEDURE — G0439 PPPS, SUBSEQ VISIT: HCPCS | Performed by: INTERNAL MEDICINE

## 2018-04-12 NOTE — PATIENT INSTRUCTIONS
Diabetes: Activity Tips    Being more active can help you manage your diabetes. The tips on this sheet can help you get the most from your exercise. They can also help you stay safe.   Staying Active  It’s important for adults to spend less time sitting a You may be told to plan your exercise for 1 to 2 hours after a meal. In most cases, you don’t need to eat while being active. If you take insulin or medicine that can cause low blood sugar, test your blood sugar before exercising.  And carry a fast-acting s Tests on this list are recommended by your physician but may not be covered, or covered at this frequency, by your insurer. Please check with your insurance carrier before scheduling to verify coverage.    PREVENTATIVE SERVICES  INDICATIONS AND SCHEDULE Int EKG - covered if needed at [facility], and non-screening if indicated for medical reasons Electrocardiogram date03/22/2018 Routine EKG is not a screening covered service except at the Welcome to Medicare Visit    Abdominal aortic aneurysm screeni Pap: Every 3 yrs age 21-65 or Pap+HPV every 5 yrs age 33-67, Covered every 2 yrs up to age 79 or Yearly if High Risk   There are no preventive care reminders to display for this patient.      Chlamydia  Annually if high risk No results found for: CHLAMYDIA Tetanus Toxoid- Only covered with a cut with metal- TD and TDaP Not covered by Medicare Part B) No orders found for this or any previous visit.  This may be covered with your prescription benefits, but Medicare does not cover unless Medically needed    Zos

## 2018-04-13 NOTE — PROGRESS NOTES
HPI:   Paulie Gutiérrez is a 80year old female who presents for a Medicare Subsequent Annual Wellness visit (Pt already had Initial Annual Wellness).     HPI:  Here for physical  Accompanied by daughter  Still with back pain related to fall and fracture of Cannot do without help   She has Meal Preparation difficulties based on screening of functional status. Preparing your meals: Cannot do without help  She has difficulties Managing Money/Bills based on screening of functional status.    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 Levothyroxine Sodium 100 MCG Oral Tab Take 1 tablet (100 mcg total) by mouth once daily.    NORTRIPTYLINE HCL 25 MG Oral Cap TAKE 1 CAPSULE (25 MG (4/8/2014); Open wound of foot except toe(s) alone, without mention of complication (6/31/4420); Other and unspecified hyperlipidemia (6/29/2012); Other and unspecified noninfectious gastroenteritis and colitis (6/29/2012);  Other nonspecific findings on ex an other family member; ulcerative colitis in an other family member. SOCIAL HISTORY:   She  reports that she has never smoked. She has never used smokeless tobacco. She reports that she does not drink alcohol or use drugs.      REVIEW OF SYSTEMS:   GENER trachea midline, no adenopathy;  thyroid: not enlarged, symmetric, no tenderness/mass/nodules; no carotid bruit or JVD   Back:   Symmetric, no curvature, ROM normal, no CVA tenderness   Lungs:   Clear to auscultation bilaterally, respirations unlabored   H (peripheral artery disease) ([facility]    Essential hypertension with goal blood pressure less than 130/80    Painful diabetic neuropathy (HCC)    Primary osteoarthritis of both knees    Uncontrolled secondary diabetes mellitus with stage 3 CKD (GFR 30-59) ([facility] your appetite been poor?: Yes  How does the patient maintain a good energy level?: Other  How would you describe your daily physical activity?: None  How would you describe your current health state?: Fair  How do you maintain positive mental well-being?: age 21-68 or Pap+HPV every 5 yrs age 33-67, age 72 and older at high risk There are no preventive care reminders to display for this patient.  Update Health Maintenance if applicable    Chlamydia  Annually if high risk No results found for: CHLAMYDIA No nghia CREATININE (mg/dL)   Date Value   07/17/2014 0.85   05/12/2011 1.54 (H)     Creatinine (mg/dL)   Date Value   04/02/2018 1.52 (H)    No flowsheet data found. Drug Serum Conc  Annually No results found for: DIGOXIN, DIG, VALP No flowsheet data found.

## 2018-04-14 ENCOUNTER — LAB ENCOUNTER (OUTPATIENT)
Dept: LAB | Age: 83
End: 2018-04-14
Attending: INTERNAL MEDICINE
Payer: MEDICARE

## 2018-04-14 DIAGNOSIS — E13.65 UNCONTROLLED SECONDARY DIABETES MELLITUS WITH STAGE 3 CKD (GFR 30-59) (HCC): ICD-10-CM

## 2018-04-14 DIAGNOSIS — N18.30 UNCONTROLLED SECONDARY DIABETES MELLITUS WITH STAGE 3 CKD (GFR 30-59) (HCC): ICD-10-CM

## 2018-04-14 DIAGNOSIS — E13.22 UNCONTROLLED SECONDARY DIABETES MELLITUS WITH STAGE 3 CKD (GFR 30-59) (HCC): ICD-10-CM

## 2018-04-14 PROCEDURE — 82570 ASSAY OF URINE CREATININE: CPT

## 2018-04-14 PROCEDURE — 82043 UR ALBUMIN QUANTITATIVE: CPT

## 2018-04-17 ENCOUNTER — TELEPHONE (OUTPATIENT)
Dept: INTERNAL MEDICINE CLINIC | Facility: CLINIC | Age: 83
End: 2018-04-17

## 2018-04-17 DIAGNOSIS — B35.6 FUNGAL INFECTION OF THE GROIN: Primary | ICD-10-CM

## 2018-04-17 DIAGNOSIS — K21.9 GASTROESOPHAGEAL REFLUX DISEASE WITHOUT ESOPHAGITIS: ICD-10-CM

## 2018-04-17 DIAGNOSIS — E11.40 PAINFUL DIABETIC NEUROPATHY (HCC): ICD-10-CM

## 2018-04-17 RX ORDER — NORTRIPTYLINE HYDROCHLORIDE 25 MG/1
CAPSULE ORAL
Qty: 90 CAPSULE | Refills: 1 | Status: SHIPPED | OUTPATIENT
Start: 2018-04-17 | End: 2018-01-01

## 2018-04-17 RX ORDER — ESOMEPRAZOLE MAGNESIUM 40 MG/1
40 CAPSULE, DELAYED RELEASE ORAL
Qty: 90 CAPSULE | Refills: 1 | Status: SHIPPED | OUTPATIENT
Start: 2018-04-17 | End: 2018-01-01

## 2018-04-17 RX ORDER — NYSTATIN 100000 U/G
1 CREAM TOPICAL 2 TIMES DAILY
Qty: 30 G | Refills: 1 | Status: SHIPPED | OUTPATIENT
Start: 2018-04-17 | End: 2018-05-01 | Stop reason: ALTCHOICE

## 2018-04-17 NOTE — TELEPHONE ENCOUNTER
EMA Morocho from [facility] called and stated patient has a fungal on skin fold of groin- between pelvic area and leg; it is angry red, so she placed barrier cream with olive oil, and is asking if Dr Naheed Thompson would like to order an anti-fungal cream? Please advise.

## 2018-04-17 NOTE — TELEPHONE ENCOUNTER
Per Dr. Hopper [facility] Nystatin Cream apply BID to affected area. D/w HHN above she expressed understanding and will notify pt and daughter. Rx sent.

## 2018-04-20 ENCOUNTER — TELEPHONE (OUTPATIENT)
Dept: INTERNAL MEDICINE CLINIC | Facility: CLINIC | Age: 83
End: 2018-04-20

## 2018-04-20 NOTE — TELEPHONE ENCOUNTER
The pt was seen in the office on 4/12 and treated for oral thrush with Nystatin after antibiotic treatment for a UTI and c-diff prevention.      On 4/17 the HHN did call to report fungal infection in the groin area and Nystatin cream was sent for bid applic

## 2018-04-20 NOTE — PROGRESS NOTES
[place] Author: Brea Ulrich MD     1929 MRN [id number]   [facility]  Admission 3/22/18      Last Hospital Discharge 3/26/18 PCP Camille Rubi MD   Hospital of Discharge 3/26/18       Date of Admission: 3 hospital s/p mechanical fall with PMH of HTN, diabetes, Type II diabetes, HL, PAF, CAD and generalized weakness. Patient was noted to have compression fracture of thoracic fracture.   Patient was placed and fitted in TLSO brace with no surgical interventi Oral Cap TAKE 1 CAPSULE (25 MG TOTAL) BY MOUTH NIGHTLY. CLOPIDOGREL BISULFATE 75 MG Oral Tab TAKE ONE TABLET BY MOUTH EVERY DAY   Multiple Vitamins-Minerals (MULTIVITAMIN OR) Take by mouth.    [DISCONTINUED] NEXIUM 40 MG Oral Capsule Delayed Release TAKE and unspecified noninfectious gastroenteritis and colitis (6/29/2012); Other nonspecific findings on examination of blood(790.99) (4/8/2014); PAD (peripheral artery disease) ([facility] (4/8/2014); Polyneuropathy in diabetes (6/29/2012);  Primary localized [facility] reports that she does not drink alcohol or use drugs. ROS:   A comprehensive 14 point review of systems was completed.     Pertinent positives and negatives noted in the HPI.       PHYSICAL EXAM:   Estimated body mass index is 24.14 kg/m² as calculated Her behavior is normal. Judgment and thought content normal.   Nursing note and vitals reviewed.           Medication Reviewed: in Epic and [employer] and Imaging: Xr Routine Thoracic Spine (3 Views) (cpt=72072)    Result Date: 3/22/2018  [address] [place] [address] consciousness. CONTRAST USED:  75cc of Omnipaque 350  FINDINGS:  LUNGS:  Motion artifact limits evaluation of the lungs. Likely moderate basilar areas of atelectasis. Numerous areas of subpleural reticulation compatible with fibrotic changes.   These ar months is suggested. Mild lymphadenopathy within the mediastinum may be reactive, however this may also be followed.     Dictated by: Rachel Denny MD on 3/22/2018 at 21:33     Approved by: Rachel Denny MD            Ct Brain Or Head (83072)    Result Date: 3/2 progression of probable moderate chronic microvascular ischemic changes. Numerous chronic areas of encephalomalacia are present. This could be better assessed with MRI if clinically indicated.     Dictated by: Cesar Munoz MD on 3/22/2018 at 17:13     Appro and C6-7. Moderate to severe bilateral neural foraminal stenosis at C3-4. Moderate bilateral neural foraminal stenosis at C4-5. Moderate to severe bilateral neural foraminal stenosis at C5-6 and C6-7. Mild bilateral neural foraminal stenosis at C7-T1. vertebral bodies. This is somewhat limited in evaluation due in part to motion and moderate to severe diffuse osteopenia. Correlation for symptoms in this region is suggested.   There is possible mild bilateral neural foraminal stenosis present at T8-9 du thoracic spine. Minimal disc bulges throughout the thoracic spine are noted. There is no high-grade spinal canal stenosis. Moderate to severe bilateral neural foraminal stenosis at T7-8 and T8-9 is noted.   No significant neural foraminal stenosis is oth VIEWS), RIGHT  (CPT=71101), 4/21/2016, 18:16. EDWARD , XR CHEST AP PORTABLE  (CPT=71010), 11/19/2014, 20:14. INDICATIONS:  fall, lac  PATIENT STATED HISTORY: (As transcribed by Technologist)  Patient fall, mid back pain.     FINDINGS:  Numerous postsurgic Gastroesophageal reflux disease without esophagitis  -continue with ppi    13. Chronic atrial fibrillation ([facility]  -continue with eliquis. 14. At high risk for injury related to fall  PT/OT    15. Generalized weakness  -PT/OT     16.  Physical deconditio

## 2018-04-26 ENCOUNTER — TELEPHONE (OUTPATIENT)
Dept: INTERNAL MEDICINE CLINIC | Facility: CLINIC | Age: 83
End: 2018-04-26

## 2018-04-26 NOTE — TELEPHONE ENCOUNTER
Ok per Dr. Vivek Garza to place OT order for left arm pain. Spoke with Antony Jackson and relayed above. She states patient has been c/o left arm pain after doing some OT exercises with 1lb. Weights. Denies and swelling or bruising.    Modalities will include icing a

## 2018-04-27 ENCOUNTER — TELEPHONE (OUTPATIENT)
Dept: INTERNAL MEDICINE CLINIC | Facility: CLINIC | Age: 83
End: 2018-04-27

## 2018-04-27 NOTE — TELEPHONE ENCOUNTER
Ok per Dr. Mary Ann Hernandez for additional PT [facility] services. Caren Oh expressed understanding.

## 2018-04-28 DIAGNOSIS — E03.4 HYPOTHYROIDISM DUE TO ACQUIRED ATROPHY OF THYROID: ICD-10-CM

## 2018-04-30 ENCOUNTER — TELEPHONE (OUTPATIENT)
Dept: INTERNAL MEDICINE CLINIC | Facility: CLINIC | Age: 83
End: 2018-04-30

## 2018-04-30 DIAGNOSIS — M17.0 PRIMARY OSTEOARTHRITIS OF BOTH KNEES: ICD-10-CM

## 2018-04-30 RX ORDER — HYDROCODONE BITARTRATE AND ACETAMINOPHEN 10; 325 MG/1; MG/1
1 TABLET ORAL EVERY 6 HOURS PRN
Qty: 120 TABLET | Refills: 0 | Status: SHIPPED | OUTPATIENT
Start: 2018-04-30 | End: 2018-06-25

## 2018-04-30 RX ORDER — LEVOTHYROXINE SODIUM 0.1 MG/1
TABLET ORAL
Qty: 90 TABLET | Refills: 0 | Status: SHIPPED | OUTPATIENT
Start: 2018-04-30 | End: 2018-07-25

## 2018-04-30 NOTE — TELEPHONE ENCOUNTER
Pt's daughter called to ask for refill script for Norco - she will be able to  on Tuesday or Wednesday.

## 2018-04-30 NOTE — TELEPHONE ENCOUNTER
Bailee, nurse with [facility], called to get orders for 4 additional visits for the pt. She is still using her back brace and having wrist pain. Call her back at #[phone].

## 2018-04-30 NOTE — TELEPHONE ENCOUNTER
Ok per Dr. Andres Velasquez to add additional nursing visits. Spoke with Joanne Ahmadi and relayed message. She verbalized understanding and agreed.

## 2018-05-01 ENCOUNTER — HOSPITAL ENCOUNTER (OUTPATIENT)
Dept: GENERAL RADIOLOGY | Facility: HOSPITAL | Age: 83
Discharge: HOME | End: 2018-05-01
Attending: PHYSICIAN ASSISTANT
Payer: MEDICARE

## 2018-05-01 ENCOUNTER — PATIENT MESSAGE (OUTPATIENT)
Dept: SURGERY | Facility: CLINIC | Age: 83
End: 2018-05-01

## 2018-05-01 ENCOUNTER — OFFICE VISIT (OUTPATIENT)
Dept: SURGERY | Facility: CLINIC | Age: 83
End: 2018-05-01

## 2018-05-01 VITALS — DIASTOLIC BLOOD PRESSURE: 58 MMHG | SYSTOLIC BLOOD PRESSURE: 120 MMHG | HEART RATE: 68 BPM

## 2018-05-01 DIAGNOSIS — S22.000D CLOSED COMPRESSION FRACTURE OF THORACIC VERTEBRA WITH ROUTINE HEALING, SUBSEQUENT ENCOUNTER: Primary | ICD-10-CM

## 2018-05-01 DIAGNOSIS — S22.000D CLOSED COMPRESSION FRACTURE OF THORACIC VERTEBRA WITH ROUTINE HEALING, SUBSEQUENT ENCOUNTER: ICD-10-CM

## 2018-05-01 PROCEDURE — 72070 X-RAY EXAM THORAC SPINE 2VWS: CPT | Performed by: PHYSICIAN ASSISTANT

## 2018-05-01 PROCEDURE — 99213 OFFICE O/P EST LOW 20 MIN: CPT | Performed by: PHYSICIAN ASSISTANT

## 2018-05-01 NOTE — PROGRESS NOTES
Pt is here to follow for compression fractures, pain currently 5-6/10    \"a little\" better than when at ED

## 2018-05-01 NOTE — PROGRESS NOTES
Neurosurgery Clinic Visit  2018    [facility] PCP:  Molly Cheadle, MD    1929 MRN [id number]     HISTORY OF PRESENT ILLNESS:  Reena Shelter is a(n) 80year old female who is here 5 weeks s/p fall with thoracic compression fracture.   Sh

## 2018-05-01 NOTE — PATIENT INSTRUCTIONS
Refill policies:    • Allow 2-3 business days for refills; controlled substances may take longer.   • Contact your pharmacy at least 5 days prior to running out of medication and have them send an electronic request or submit request through the “request re entire amount billed. Precertification and Prior Authorizations: If your physician has recommended that you have a procedure or additional testing performed.   [facility]) will contact your insurance carrier to obtain pre-certi

## 2018-05-03 ENCOUNTER — MED REC SCAN ONLY (OUTPATIENT)
Dept: INTERNAL MEDICINE CLINIC | Facility: CLINIC | Age: 83
End: 2018-05-03

## 2018-05-03 RX ORDER — CARISOPRODOL 350 MG/1
350 TABLET ORAL NIGHTLY
Qty: 7 TABLET | Refills: 0 | Status: SHIPPED
Start: 2018-05-03 | End: 2018-05-29

## 2018-05-03 NOTE — TELEPHONE ENCOUNTER
HHN reports having pain that began last week. The PT and OT began to have complaints with left wrist pain.  The pt is using a walker with the back brace and the therapists first believed this was due to the pressure with the back brace and the over compensa

## 2018-05-03 NOTE — TELEPHONE ENCOUNTER
EMA Morocho from [place] [address] called and stated patient had a thoracic fracture, and has a back brace. She is experiencing wrist and elbow pain, and now she feels left neck and shoulder pain.  She uses a walker, and the home health nurse thinks she

## 2018-05-04 ENCOUNTER — TELEPHONE (OUTPATIENT)
Dept: INTERNAL MEDICINE CLINIC | Facility: CLINIC | Age: 83
End: 2018-05-04

## 2018-05-04 DIAGNOSIS — B35.6 FUNGAL INFECTION OF THE GROIN: ICD-10-CM

## 2018-05-04 RX ORDER — NYSTATIN 100000 U/G
CREAM TOPICAL
Qty: 30 G | Refills: 0 | Status: SHIPPED | OUTPATIENT
Start: 2018-05-04 | End: 2018-08-21

## 2018-05-04 NOTE — TELEPHONE ENCOUNTER
Ok per Dr. Adrianna De Paz to extend OT services for next week. Ras Hoskins at [facility] informed of above and will fax new orders for Dr. Adrianna De Paz to sign.

## 2018-05-10 ENCOUNTER — TELEPHONE (OUTPATIENT)
Dept: INTERNAL MEDICINE CLINIC | Facility: CLINIC | Age: 83
End: 2018-05-10

## 2018-05-17 ENCOUNTER — TELEPHONE (OUTPATIENT)
Dept: INTERNAL MEDICINE CLINIC | Facility: CLINIC | Age: 83
End: 2018-05-17

## 2018-05-17 DIAGNOSIS — M25.532 LEFT WRIST PAIN: Primary | ICD-10-CM

## 2018-05-17 NOTE — TELEPHONE ENCOUNTER
Per Dr. Walter Vance, patient to wear support (brace or ace-wrap) for transfers, continue Norco as directed for pain and xray to r/o fracture. Spoke with Rustam Goel at [facility] and relayed recommendations. She verbalized understanding and agreed with plan.  She will relay o

## 2018-05-17 NOTE — TELEPHONE ENCOUNTER
hh nurse called in and would like to consult w/ nurse for alternative to pt's persistent wrist pain. Pt states that she did not like how she felt on the muscle relaxer so stopped taking it and now pain is back.

## 2018-05-18 NOTE — TELEPHONE ENCOUNTER
From: Myriam Love PA-C  To: Beverly Hilliard  Sent: 5/1/2018 4:05 PM CDT  Subject: results    Brea,    Your xray looks stable. You may wean from your brace at 8 weeks (3 weeks from now).  For the first week, you may remove your brace for 2 hours per

## 2018-05-23 ENCOUNTER — TELEPHONE (OUTPATIENT)
Dept: INTERNAL MEDICINE CLINIC | Facility: CLINIC | Age: 83
End: 2018-05-23

## 2018-05-23 NOTE — TELEPHONE ENCOUNTER
Spoke with [facility] OT pt is at her max OT status and she will be discharged from [facility] OT. Ok per Dr. Mary Ann Hernandez to discharge from [facility] OT. Dana Gutierrez expressed understanding.

## 2018-05-23 NOTE — TELEPHONE ENCOUNTER
Ella Rai, occupational therapist with [facility], left a voicemail requesting to speak w/ a nurse about pt being discharged this week from [facility] services. Please c/b at #[phone].

## 2018-05-25 ENCOUNTER — HOSPITAL ENCOUNTER (OUTPATIENT)
Dept: GENERAL RADIOLOGY | Age: 83
Discharge: HOME | End: 2018-05-25
Attending: NURSE PRACTITIONER
Payer: MEDICARE

## 2018-05-25 ENCOUNTER — HOSPITAL ENCOUNTER (OUTPATIENT)
Dept: GENERAL RADIOLOGY | Age: 83
Discharge: HOME | End: 2018-05-25
Attending: INTERNAL MEDICINE
Payer: MEDICARE

## 2018-05-25 DIAGNOSIS — M25.532 LEFT WRIST PAIN: ICD-10-CM

## 2018-05-25 DIAGNOSIS — S22.000A CLOSED COMPRESSION FRACTURE OF THORACIC VERTEBRA, INITIAL ENCOUNTER (HCC): ICD-10-CM

## 2018-05-25 PROCEDURE — 72072 X-RAY EXAM THORAC SPINE 3VWS: CPT | Performed by: NURSE PRACTITIONER

## 2018-05-25 PROCEDURE — 73110 X-RAY EXAM OF WRIST: CPT | Performed by: INTERNAL MEDICINE

## 2018-05-29 ENCOUNTER — OFFICE VISIT (OUTPATIENT)
Dept: SURGERY | Facility: CLINIC | Age: 83
End: 2018-05-29

## 2018-05-29 ENCOUNTER — TELEPHONE (OUTPATIENT)
Dept: INTERNAL MEDICINE CLINIC | Facility: CLINIC | Age: 83
End: 2018-05-29

## 2018-05-29 VITALS — DIASTOLIC BLOOD PRESSURE: 58 MMHG | SYSTOLIC BLOOD PRESSURE: 120 MMHG | HEART RATE: 70 BPM

## 2018-05-29 DIAGNOSIS — S22.000D CLOSED COMPRESSION FRACTURE OF THORACIC VERTEBRA WITH ROUTINE HEALING, SUBSEQUENT ENCOUNTER: Primary | ICD-10-CM

## 2018-05-29 DIAGNOSIS — E08.40 DIABETES MELLITUS DUE TO UNDERLYING CONDITION WITH DIABETIC NEUROPATHY, WITHOUT LONG-TERM CURRENT USE OF INSULIN (HCC): ICD-10-CM

## 2018-05-29 PROBLEM — S22.000A THORACIC COMPRESSION FRACTURE (HCC): Status: ACTIVE | Noted: 2018-03-22

## 2018-05-29 PROCEDURE — 99213 OFFICE O/P EST LOW 20 MIN: CPT | Performed by: NEUROLOGICAL SURGERY

## 2018-05-29 RX ORDER — GLIMEPIRIDE 4 MG/1
TABLET ORAL
Qty: 180 TABLET | Refills: 0 | Status: SHIPPED | OUTPATIENT
Start: 2018-05-29 | End: 2018-07-17

## 2018-05-29 NOTE — PROGRESS NOTES
Neurosurgery Clinic Visit  2018    Alfred Luna PCP:  Kary Ascencio MD    1929 MRN [id number]     HISTORY OF PRESENT ILLNESS:  Alfred Luna is a(n) 80year old female here for follow-up regarding thoracic fracture  She is doing really

## 2018-05-29 NOTE — PATIENT INSTRUCTIONS
Refill policies:    • Allow 2-3 business days for refills; controlled substances may take longer.   • Contact your pharmacy at least 5 days prior to running out of medication and have them send an electronic request or submit request through the “request re entire amount billed. Precertification and Prior Authorizations: If your physician has recommended that you have a procedure or additional testing performed.   [facility]) will contact your insurance carrier to obtain pre-certi

## 2018-05-29 NOTE — TELEPHONE ENCOUNTER
Spoke with BRAYAN NICHOLS [facility] she is requesting to have pt's [facility] services re certified. Ok per Dr. Franny Lebron. Bailee aware and expressed understanding.

## 2018-06-01 ENCOUNTER — MED REC SCAN ONLY (OUTPATIENT)
Dept: INTERNAL MEDICINE CLINIC | Facility: CLINIC | Age: 83
End: 2018-06-01

## 2018-06-04 PROBLEM — I50.22 CHRONIC SYSTOLIC CONGESTIVE HEART FAILURE (HCC): Status: ACTIVE | Noted: 2018-06-04

## 2018-06-25 ENCOUNTER — TELEPHONE (OUTPATIENT)
Dept: INTERNAL MEDICINE CLINIC | Facility: CLINIC | Age: 83
End: 2018-06-25

## 2018-06-25 DIAGNOSIS — M17.0 PRIMARY OSTEOARTHRITIS OF BOTH KNEES: ICD-10-CM

## 2018-06-25 DIAGNOSIS — E11.40 PAINFUL DIABETIC NEUROPATHY (HCC): Primary | ICD-10-CM

## 2018-06-25 RX ORDER — HYDROCODONE BITARTRATE AND ACETAMINOPHEN 10; 325 MG/1; MG/1
1 TABLET ORAL EVERY 6 HOURS PRN
Qty: 120 TABLET | Refills: 0 | Status: SHIPPED | OUTPATIENT
Start: 2018-06-25 | End: 2018-08-21

## 2018-06-25 NOTE — TELEPHONE ENCOUNTER
Pt's daughter, Rayshawn Mishra, left a voicemail asking for a refill script for HYDROcodone-acetaminophen  MG - please call her on cell # when ready to be picked up.

## 2018-07-16 ENCOUNTER — HOSPITAL ENCOUNTER (OUTPATIENT)
Dept: ULTRASOUND IMAGING | Facility: HOSPITAL | Age: 83
Discharge: HOME | End: 2018-07-16
Attending: SURGERY
Payer: MEDICARE

## 2018-07-16 DIAGNOSIS — Z01.818 PREOP EXAMINATION: ICD-10-CM

## 2018-07-16 PROCEDURE — 93925 LOWER EXTREMITY STUDY: CPT | Performed by: SURGERY

## 2018-07-16 PROCEDURE — 93970 EXTREMITY STUDY: CPT | Performed by: SURGERY

## 2018-07-17 ENCOUNTER — TELEPHONE (OUTPATIENT)
Dept: INTERNAL MEDICINE CLINIC | Facility: CLINIC | Age: 83
End: 2018-07-17

## 2018-07-17 DIAGNOSIS — E08.40 DIABETES MELLITUS DUE TO UNDERLYING CONDITION WITH DIABETIC NEUROPATHY, WITHOUT LONG-TERM CURRENT USE OF INSULIN (HCC): ICD-10-CM

## 2018-07-17 RX ORDER — GLIMEPIRIDE 4 MG/1
2 TABLET ORAL
Qty: 180 TABLET | Refills: 0 | Status: ON HOLD | COMMUNITY
Start: 2018-07-17 | End: 2019-01-01

## 2018-07-17 NOTE — TELEPHONE ENCOUNTER
Dr. Sherman Patient recommends Glimepiride 2mg daily with breakfast. Continue to monitor glucose and repeat labs in October. Relayed recommendations to Ana Maria. She verbalized understanding and agreed with plan.

## 2018-07-17 NOTE — TELEPHONE ENCOUNTER
Spoke with Martha Fernandez and she states patient's blood sugar has been low in the mornings ranging between 70-90's. Patient's appetite has decreased and she is sleeping more during the day.    Patient is supposed to be on Glimepiride 4mg BID, but she has only been

## 2018-07-20 ENCOUNTER — TELEPHONE (OUTPATIENT)
Dept: INTERNAL MEDICINE CLINIC | Facility: CLINIC | Age: 83
End: 2018-07-20

## 2018-07-20 NOTE — TELEPHONE ENCOUNTER
Home Health Nurse would like orders for continued care but would like to discuss some issues, including a sore on the R great toe

## 2018-07-20 NOTE — TELEPHONE ENCOUNTER
The pt will be coming to the end of the episode for the next few weeks. The pt could be re-certified for an additional 6 weeks for home health. The home health is waiting to hear from the podiatrist for recommendtions.  The pt has a wound on the tip of the

## 2018-07-23 ENCOUNTER — MED REC SCAN ONLY (OUTPATIENT)
Dept: INTERNAL MEDICINE CLINIC | Facility: CLINIC | Age: 83
End: 2018-07-23

## 2018-07-25 DIAGNOSIS — E03.4 HYPOTHYROIDISM DUE TO ACQUIRED ATROPHY OF THYROID: ICD-10-CM

## 2018-07-26 RX ORDER — LEVOTHYROXINE SODIUM 0.1 MG/1
TABLET ORAL
Qty: 30 TABLET | Refills: 0 | Status: SHIPPED | OUTPATIENT
Start: 2018-07-26 | End: 2018-01-01

## 2018-07-26 NOTE — TELEPHONE ENCOUNTER
Rx sent to retail for 1 month per failed protocol. Note sent with the refill reminding the pt thyroid labs are due. The pt's last TSH was completed in 4/2017. The pt has had TSH pending since 1/9/2018. The pt does have an appointment scheduled in 10/2018.

## 2018-08-01 ENCOUNTER — LAB ENCOUNTER (OUTPATIENT)
Dept: LAB | Age: 83
End: 2018-08-01
Attending: SURGERY
Payer: MEDICARE

## 2018-08-01 DIAGNOSIS — L97.519 RIGHT FOOT ULCER (HCC): Primary | ICD-10-CM

## 2018-08-01 LAB
ALBUMIN SERPL-MCNC: 2.9 G/DL (ref 3.5–4.8)
ALBUMIN/GLOB SERPL: 0.7 {RATIO} (ref 1–2)
ALP LIVER SERPL-CCNC: 101 U/L (ref 55–142)
ALT SERPL-CCNC: 21 U/L (ref 14–54)
ANION GAP SERPL CALC-SCNC: 7 MMOL/L (ref 0–18)
AST SERPL-CCNC: 20 U/L (ref 15–41)
BILIRUB SERPL-MCNC: 0.3 MG/DL (ref 0.1–2)
BUN BLD-MCNC: 48 MG/DL (ref 8–20)
BUN/CREAT SERPL: 30.2 (ref 10–20)
CALCIUM BLD-MCNC: 8.8 MG/DL (ref 8.3–10.3)
CHLORIDE SERPL-SCNC: 103 MMOL/L (ref 101–111)
CO2 SERPL-SCNC: 29 MMOL/L (ref 22–32)
CREAT BLD-MCNC: 1.59 MG/DL (ref 0.55–1.02)
GLOBULIN PLAS-MCNC: 4.1 G/DL (ref 2.5–3.7)
GLUCOSE BLD-MCNC: 208 MG/DL (ref 70–99)
M PROTEIN MFR SERPL ELPH: 7 G/DL (ref 6.1–8.3)
OSMOLALITY SERPL CALC.SUM OF ELEC: 307 MOSM/KG (ref 275–295)
POTASSIUM SERPL-SCNC: 4.8 MMOL/L (ref 3.6–5.1)
SODIUM SERPL-SCNC: 139 MMOL/L (ref 136–144)

## 2018-08-01 PROCEDURE — 36415 COLL VENOUS BLD VENIPUNCTURE: CPT

## 2018-08-01 PROCEDURE — 80053 COMPREHEN METABOLIC PANEL: CPT

## 2018-08-02 ENCOUNTER — MED REC SCAN ONLY (OUTPATIENT)
Dept: INTERNAL MEDICINE CLINIC | Facility: CLINIC | Age: 83
End: 2018-08-02

## 2018-08-06 ENCOUNTER — HOSPITAL ENCOUNTER (OUTPATIENT)
Dept: CT IMAGING | Facility: HOSPITAL | Age: 83
Discharge: HOME | End: 2018-08-06
Attending: SURGERY
Payer: MEDICARE

## 2018-08-06 VITALS
SYSTOLIC BLOOD PRESSURE: 152 MMHG | OXYGEN SATURATION: 99 % | TEMPERATURE: 98 F | HEART RATE: 71 BPM | DIASTOLIC BLOOD PRESSURE: 58 MMHG | RESPIRATION RATE: 16 BRPM

## 2018-08-06 DIAGNOSIS — L97.519 RIGHT FOOT ULCER (HCC): ICD-10-CM

## 2018-08-06 PROCEDURE — 73706 CT ANGIO LWR EXTR W/O&W/DYE: CPT | Performed by: SURGERY

## 2018-08-06 PROCEDURE — 96361 HYDRATE IV INFUSION ADD-ON: CPT | Performed by: SURGERY

## 2018-08-06 PROCEDURE — 96360 HYDRATION IV INFUSION INIT: CPT | Performed by: SURGERY

## 2018-08-06 RX ORDER — SODIUM CHLORIDE 9 MG/ML
INJECTION, SOLUTION INTRAVENOUS CONTINUOUS
Status: ACTIVE | OUTPATIENT
Start: 2018-08-06 | End: 2018-08-06

## 2018-08-06 RX ADMIN — SODIUM CHLORIDE: 9 INJECTION, SOLUTION INTRAVENOUS at 15:15:00

## 2018-08-06 NOTE — IMAGING NOTE
Called Dr Ck Vargas and talked to Nilay Ramesh for hydration orders. Notified patient is scheduled for 0430 PM today for the CT. Fax no  provided.

## 2018-08-21 DIAGNOSIS — M17.0 PRIMARY OSTEOARTHRITIS OF BOTH KNEES: ICD-10-CM

## 2018-08-21 DIAGNOSIS — B35.6 FUNGAL INFECTION OF THE GROIN: ICD-10-CM

## 2018-08-21 DIAGNOSIS — E11.40 PAINFUL DIABETIC NEUROPATHY (HCC): ICD-10-CM

## 2018-08-21 RX ORDER — NYSTATIN 100000 U/G
CREAM TOPICAL
Qty: 30 G | Refills: 0 | Status: SHIPPED | OUTPATIENT
Start: 2018-08-21 | End: 2019-01-01 | Stop reason: CLARIF

## 2018-08-21 RX ORDER — HYDROCODONE BITARTRATE AND ACETAMINOPHEN 10; 325 MG/1; MG/1
1 TABLET ORAL EVERY 6 HOURS PRN
Qty: 120 TABLET | Refills: 0 | Status: SHIPPED | OUTPATIENT
Start: 2018-08-21 | End: 2018-01-01

## 2018-08-21 NOTE — TELEPHONE ENCOUNTER
Refill Norco, call when ready for . .. Laureen Band Laureen Band Laureen Band refill on [address], [place] in [place]

## 2018-08-22 DIAGNOSIS — J30.89 ENVIRONMENTAL AND SEASONAL ALLERGIES: ICD-10-CM

## 2018-08-22 RX ORDER — MONTELUKAST SODIUM 10 MG/1
TABLET ORAL
Qty: 90 TABLET | Refills: 1 | Status: SHIPPED | OUTPATIENT
Start: 2018-08-22 | End: 2019-01-01

## 2018-08-25 NOTE — PROGRESS NOTES
CHIEF COMPLAINT:   Patient presents with:  Finger Pain: ring finger on LT index finger x 5 days, area is red and some dischareg from nail bed      HPI:     Paulie Gutiérrez is a 80year old female who presents with concerns of skin infection of the left 4t nitroGLYCERIN 0.2 MG/HR Transdermal Patch 24 Hr Place 1 patch onto the skin once daily. Disp: 30 patch Rfl: 5   atorvastatin 40 MG Oral Tab Take 1 tablet (40 mg total) by mouth daily.  Disp: 90 tablet Rfl: 3   furosemide 20 MG Oral Tab Take 1 tablet (20 mg lower leg localized swelling   • Measles 9/16/2010   • Mitral valve disorders(424.0) 6/29/2012   • OA (osteoarthritis) 4/8/2014   • Open wound of foot except toe(s) alone, without mention of complication 3/91/2879    diabetic foot ulcer/L great toe   • Ot GENERAL: well developed, well nourished,in no apparent distress  SKIN: + paronychia at base proximal nail fold of left 4th finger; area is erythematous, raised, tender to palpation. Nail bed is intact. no drainage at this time.   FAROM of digit, no pain w Paronychia is an infection near a fingernail or toenail. It usually occurs when an opening in the cuticle or an ingrown toenail lets bacteria under the skin. The infection will need to be drained if pus is present.  If the infection has been caught early, Date Last Reviewed: 8/1/2016  © 9535-7998 [employer]. [address] [facility], [address] [place]. All rights reserved. This information is not intended as a substitute for professional medical care.  Always follow your healthcare professional'

## 2018-08-25 NOTE — PATIENT INSTRUCTIONS
· Soak your nail in warm water for 20 minutes 3 times a day. · Apply antibiotic ointment after each warm soak.   · Complete all of the antibiotic as prescribed  · Eat yogurt or take a probiotic daily for 2 weeks to help prevent antibiotic associated diar The following can prevent paronychia:  · Avoid cutting or playing with your cuticles at home. · Don't bite your nails. · Don't suck on your thumbs or fingers. Follow-up care  Follow up with your healthcare provider, or as advised.   When to seek medical

## 2018-08-27 ENCOUNTER — PRIOR ORIGINAL RECORDS (OUTPATIENT)
Dept: OTHER | Age: 83
End: 2018-08-27

## 2018-08-27 ENCOUNTER — MYAURORA ACCOUNT LINK (OUTPATIENT)
Dept: OTHER | Age: 83
End: 2018-08-27

## 2018-08-29 NOTE — TELEPHONE ENCOUNTER
Ok per Dr. Shoshana Edward to refill. D/w daughter due for TSH. Pt will complete before next refill.

## 2018-08-30 NOTE — TELEPHONE ENCOUNTER
Naif Crane RN from [facility] called and asked if patient is due for labs, and if ok for her to draw them. She will need the labs stated to her; please call her back.

## 2018-09-06 LAB
ALBUMIN: 2.5 G/DL
ALKALINE PHOSPHATATE(ALK PHOS): 129 IU/L
BILIRUBIN TOTAL: 0.4 MG/DL
BUN: 35 MG/DL
CALCIUM: 8.5 MG/DL
CHLORIDE: 107 MEQ/L
CHOLESTEROL, TOTAL: 114 MG/DL
CREATININE, SERUM: 1.52 MG/DL
GLUCOSE: 74 MG/DL
HDL CHOLESTEROL: 37 MG/DL
HEMATOCRIT: 35.3 %
HEMOGLOBIN: 11.4 G/DL
LDL CHOLESTEROL: 56 MG/DL
PLATELETS: 286 K/UL
POTASSIUM, SERUM: 4.2 MEQ/L
PROTEIN, TOTAL: 6.2 G/DL
RED BLOOD COUNT: 3.69 X 10-6/U
SGOT (AST): 35 IU/L
SGPT (ALT): 39 IU/L
SODIUM: 140 MEQ/L
TRIGLYCERIDES: 106 MG/DL
WHITE BLOOD COUNT: 10.5 X 10-3/U

## 2018-09-19 NOTE — TELEPHONE ENCOUNTER
Ok per Dr Andres Velasquez to additional [facility] nursing visit. LMOVM informing Agustina  of this.

## 2018-09-19 NOTE — TELEPHONE ENCOUNTER
EMA Berrios from [place] [address] called requesting 2 additional nursing visits. One per week, and then will DC. Please call back at [phone].

## 2018-09-28 NOTE — TELEPHONE ENCOUNTER
Patient's daughter requesting 90 day supply refill of LEVOTHYROXINE SODIUM 100 MCG - send to [address] on file.

## 2018-10-11 PROBLEM — B37.0 THRUSH, ORAL: Status: RESOLVED | Noted: 2018-04-12 | Resolved: 2018-01-01

## 2018-10-11 NOTE — PROGRESS NOTES
HPI:    Patient ID: Lilliam Monsalve is a 80year old female. HPI  HPI:   Lilliam Monsalve is a 80year old female who presents for recheck of her diabetes, htn, hyperlipidemia, gerd, OA of the knees. Patient’s FBS have been 120's. Denies hypoglycemia. MOUTH EVERY NIGHT Disp: 90 capsule Rfl: 1   Esomeprazole Magnesium (NEXIUM) 40 MG Oral Capsule Delayed Release Take 1 capsule (40 mg total) by mouth every morning before breakfast. takes daily Disp: 90 capsule Rfl: 1   mupirocin 2 % External Ointment Apply prn Disp:  Rfl:       Past Medical History:   Diagnosis Date   • Abnormality of gait 6/29/2012   • Acute myocardial infarction [facility]) 3/19/2012   • Atherosclerosis of coronary artery    • [facility] 6/29/2012   • Back pain 4/8/2014    Lumbar uncontrolled    • Unspecified essential hypertension 6/29/2012   • Unspecified hypothyroidism 6/29/2012   • Unspecified urinary incontinence 6/29/2012   • Unspecified venous (peripheral) insufficiency 6/29/2012      Past Surgical History:   Procedure Later Tobacco Use      Smoking status: Never Smoker      Smokeless tobacco: Never Used    Alcohol use: No    Drug use: No    Exercise: none.   Diet: watches sugar closely     REVIEW OF SYSTEMS:   GENERAL HEALTH: feels well otherwise  SKIN: denies any unusual skin Esomeprazole Magnesium (NEXIUM) 40 MG Oral Capsule Delayed Release Take 1 capsule (40 mg total) by mouth every morning before breakfast. takes daily Disp: 90 capsule Rfl: 1   mupirocin 2 % External Ointment Apply 1 Application topically 3 (three) times d Tape           RASH  Latex                   RASH  Sulfa Antibiotics       HIVES   PHYSICAL EXAM:   Physical Exam           ASSESSMENT/PLAN:   Uncontrolled secondary diabetes mellitus with stage 3 ckd (gfr 30-59) (hcc)  (primary encounter diagnosis)  Hyper

## 2018-11-16 NOTE — TELEPHONE ENCOUNTER
Patient's daughter, April He, called to say the patient has been having muscle spasms in her legs and arms each morning this week, and she wants to know if it could be related to dehydration? Please call her back to discuss, #[phone].

## 2018-11-16 NOTE — TELEPHONE ENCOUNTER
Spoke with daughter pt has had muscle spasms in her upper and lower extremities the last 3 days. They are worse in the morning but can occur in the afternoon as well.  Daughter denies the following symptoms:  Nausea, vomiting, diarrhea, urinary symptoms or

## 2019-01-01 ENCOUNTER — SNF DISCHARGE (OUTPATIENT)
Dept: INTERNAL MEDICINE CLINIC | Age: 84
End: 2019-01-01

## 2019-01-01 ENCOUNTER — NURSE ONLY (OUTPATIENT)
Dept: LAB | Age: 84
End: 2019-01-01
Attending: FAMILY MEDICINE
Payer: MEDICARE

## 2019-01-01 ENCOUNTER — APPOINTMENT (OUTPATIENT)
Dept: WOUND CARE | Facility: HOSPITAL | Age: 84
End: 2019-01-01
Attending: INTERNAL MEDICINE
Payer: MEDICARE

## 2019-01-01 ENCOUNTER — APPOINTMENT (OUTPATIENT)
Dept: GENERAL RADIOLOGY | Facility: HOSPITAL | Age: 84
DRG: 291 | End: 2019-01-01
Attending: HOSPITALIST
Payer: MEDICARE

## 2019-01-01 ENCOUNTER — LAB REQUISITION (OUTPATIENT)
Dept: LAB | Facility: HOSPITAL | Age: 84
DRG: 683 | End: 2019-01-01
Payer: MEDICARE

## 2019-01-01 ENCOUNTER — LAB ENCOUNTER (OUTPATIENT)
Dept: LAB | Age: 84
End: 2019-01-01
Attending: INTERNAL MEDICINE
Payer: MEDICARE

## 2019-01-01 ENCOUNTER — MED REC SCAN ONLY (OUTPATIENT)
Dept: INTERNAL MEDICINE CLINIC | Facility: CLINIC | Age: 84
End: 2019-01-01

## 2019-01-01 ENCOUNTER — LAB REQUISITION (OUTPATIENT)
Dept: LAB | Facility: HOSPITAL | Age: 84
End: 2019-01-01
Payer: MEDICARE

## 2019-01-01 ENCOUNTER — TELEPHONE (OUTPATIENT)
Dept: INTERNAL MEDICINE CLINIC | Facility: CLINIC | Age: 84
End: 2019-01-01

## 2019-01-01 ENCOUNTER — SNF VISIT (OUTPATIENT)
Dept: INTERNAL MEDICINE CLINIC | Age: 84
End: 2019-01-01

## 2019-01-01 ENCOUNTER — NURSE ONLY (OUTPATIENT)
Dept: LAB | Age: 84
End: 2019-01-01
Attending: INTERNAL MEDICINE
Payer: MEDICARE

## 2019-01-01 ENCOUNTER — MED REC SCAN ONLY (OUTPATIENT)
Dept: FAMILY MEDICINE CLINIC | Facility: CLINIC | Age: 84
End: 2019-01-01

## 2019-01-01 ENCOUNTER — APPOINTMENT (OUTPATIENT)
Dept: GENERAL RADIOLOGY | Facility: HOSPITAL | Age: 84
DRG: 629 | End: 2019-01-01
Attending: NURSE PRACTITIONER
Payer: MEDICARE

## 2019-01-01 ENCOUNTER — EXTERNAL FACILITY (OUTPATIENT)
Dept: FAMILY MEDICINE CLINIC | Facility: CLINIC | Age: 84
End: 2019-01-01

## 2019-01-01 ENCOUNTER — APPOINTMENT (OUTPATIENT)
Dept: GENERAL RADIOLOGY | Facility: HOSPITAL | Age: 84
DRG: 683 | End: 2019-01-01
Attending: EMERGENCY MEDICINE
Payer: MEDICARE

## 2019-01-01 ENCOUNTER — APPOINTMENT (OUTPATIENT)
Dept: GENERAL RADIOLOGY | Facility: HOSPITAL | Age: 84
End: 2019-01-01
Attending: HOSPITALIST
Payer: MEDICARE

## 2019-01-01 ENCOUNTER — INITIAL APN SNF VISIT (OUTPATIENT)
Dept: INTERNAL MEDICINE CLINIC | Age: 84
End: 2019-01-01

## 2019-01-01 ENCOUNTER — HOSPITAL ENCOUNTER (INPATIENT)
Facility: HOSPITAL | Age: 84
LOS: 2 days | Discharge: NURSING, REHAB OR LONG-TERM CARE | DRG: 683 | End: 2019-01-01
Attending: EMERGENCY MEDICINE | Admitting: HOSPITALIST
Payer: MEDICARE

## 2019-01-01 ENCOUNTER — HOSPITAL ENCOUNTER (INPATIENT)
Facility: HOSPITAL | Age: 84
LOS: 5 days | Discharge: NURSING, REHAB OR LONG-TERM CARE | DRG: 291 | End: 2019-01-01
Attending: EMERGENCY MEDICINE | Admitting: HOSPITALIST
Payer: MEDICARE

## 2019-01-01 ENCOUNTER — APPOINTMENT (OUTPATIENT)
Dept: GENERAL RADIOLOGY | Facility: HOSPITAL | Age: 84
DRG: 683 | End: 2019-01-01
Attending: HOSPITALIST
Payer: MEDICARE

## 2019-01-01 ENCOUNTER — PATIENT OUTREACH (OUTPATIENT)
Dept: CASE MANAGEMENT | Age: 84
End: 2019-01-01

## 2019-01-01 ENCOUNTER — HOSPITAL ENCOUNTER (OUTPATIENT)
Dept: GENERAL RADIOLOGY | Age: 84
Discharge: HOME | DRG: 291 | End: 2019-01-01
Attending: NURSE PRACTITIONER
Payer: MEDICARE

## 2019-01-01 ENCOUNTER — APPOINTMENT (OUTPATIENT)
Dept: LAB | Age: 84
DRG: 291 | End: 2019-01-01
Attending: INTERNAL MEDICINE
Payer: MEDICARE

## 2019-01-01 ENCOUNTER — ANESTHESIA EVENT (OUTPATIENT)
Dept: SURGERY | Facility: HOSPITAL | Age: 84
End: 2019-01-01

## 2019-01-01 ENCOUNTER — PATIENT MESSAGE (OUTPATIENT)
Dept: INTERNAL MEDICINE CLINIC | Facility: CLINIC | Age: 84
End: 2019-01-01

## 2019-01-01 ENCOUNTER — APPOINTMENT (OUTPATIENT)
Dept: CV DIAGNOSTICS | Facility: HOSPITAL | Age: 84
DRG: 291 | End: 2019-01-01
Attending: HOSPITALIST
Payer: MEDICARE

## 2019-01-01 ENCOUNTER — ANESTHESIA (OUTPATIENT)
Dept: SURGERY | Facility: HOSPITAL | Age: 84
End: 2019-01-01

## 2019-01-01 ENCOUNTER — OFFICE VISIT (OUTPATIENT)
Dept: INTERNAL MEDICINE CLINIC | Facility: CLINIC | Age: 84
End: 2019-01-01
Payer: MEDICARE

## 2019-01-01 ENCOUNTER — HOSPITAL ENCOUNTER (INPATIENT)
Facility: HOSPITAL | Age: 84
LOS: 6 days | Discharge: NURSING, REHAB OR LONG-TERM CARE | DRG: 629 | End: 2019-01-01
Attending: EMERGENCY MEDICINE | Admitting: HOSPITALIST
Payer: MEDICARE

## 2019-01-01 ENCOUNTER — LAB REQUISITION (OUTPATIENT)
Dept: LAB | Age: 84
End: 2019-01-01
Payer: MEDICARE

## 2019-01-01 ENCOUNTER — HOSPITAL ENCOUNTER (EMERGENCY)
Facility: HOSPITAL | Age: 84
Discharge: HOME | End: 2019-01-01
Attending: EMERGENCY MEDICINE
Payer: MEDICARE

## 2019-01-01 VITALS
RESPIRATION RATE: 17 BRPM | DIASTOLIC BLOOD PRESSURE: 56 MMHG | TEMPERATURE: 97 F | BODY MASS INDEX: 25 KG/M2 | OXYGEN SATURATION: 100 % | HEART RATE: 70 BPM | WEIGHT: 165.81 LBS | SYSTOLIC BLOOD PRESSURE: 127 MMHG

## 2019-01-01 VITALS
OXYGEN SATURATION: 97 % | DIASTOLIC BLOOD PRESSURE: 73 MMHG | HEART RATE: 76 BPM | WEIGHT: 166.5 LBS | SYSTOLIC BLOOD PRESSURE: 128 MMHG | TEMPERATURE: 96 F | RESPIRATION RATE: 18 BRPM | BODY MASS INDEX: 25 KG/M2

## 2019-01-01 VITALS
SYSTOLIC BLOOD PRESSURE: 124 MMHG | HEART RATE: 76 BPM | WEIGHT: 163.19 LBS | HEIGHT: 69 IN | TEMPERATURE: 97 F | OXYGEN SATURATION: 95 % | RESPIRATION RATE: 18 BRPM | BODY MASS INDEX: 25.89 KG/M2 | DIASTOLIC BLOOD PRESSURE: 61 MMHG | DIASTOLIC BLOOD PRESSURE: 39 MMHG | OXYGEN SATURATION: 100 % | SYSTOLIC BLOOD PRESSURE: 125 MMHG | BODY MASS INDEX: 23 KG/M2 | RESPIRATION RATE: 18 BRPM | TEMPERATURE: 98 F | WEIGHT: 174.81 LBS | HEART RATE: 77 BPM

## 2019-01-01 VITALS
OXYGEN SATURATION: 92 % | HEART RATE: 73 BPM | SYSTOLIC BLOOD PRESSURE: 133 MMHG | RESPIRATION RATE: 20 BRPM | WEIGHT: 164.88 LBS | TEMPERATURE: 98 F | DIASTOLIC BLOOD PRESSURE: 54 MMHG | BODY MASS INDEX: 24 KG/M2

## 2019-01-01 VITALS
HEART RATE: 72 BPM | TEMPERATURE: 98 F | RESPIRATION RATE: 16 BRPM | DIASTOLIC BLOOD PRESSURE: 58 MMHG | BODY MASS INDEX: 25 KG/M2 | OXYGEN SATURATION: 93 % | HEIGHT: 68 IN | SYSTOLIC BLOOD PRESSURE: 106 MMHG

## 2019-01-01 VITALS
RESPIRATION RATE: 18 BRPM | TEMPERATURE: 97 F | SYSTOLIC BLOOD PRESSURE: 152 MMHG | DIASTOLIC BLOOD PRESSURE: 60 MMHG | OXYGEN SATURATION: 96 % | BODY MASS INDEX: 26 KG/M2 | WEIGHT: 170.19 LBS | HEART RATE: 66 BPM

## 2019-01-01 VITALS
TEMPERATURE: 98 F | BODY MASS INDEX: 25 KG/M2 | OXYGEN SATURATION: 97 % | DIASTOLIC BLOOD PRESSURE: 55 MMHG | HEART RATE: 79 BPM | SYSTOLIC BLOOD PRESSURE: 116 MMHG | RESPIRATION RATE: 16 BRPM | WEIGHT: 165.31 LBS

## 2019-01-01 VITALS
DIASTOLIC BLOOD PRESSURE: 54 MMHG | OXYGEN SATURATION: 97 % | BODY MASS INDEX: 24 KG/M2 | RESPIRATION RATE: 20 BRPM | HEART RATE: 67 BPM | HEART RATE: 73 BPM | TEMPERATURE: 97 F | SYSTOLIC BLOOD PRESSURE: 110 MMHG | OXYGEN SATURATION: 92 % | SYSTOLIC BLOOD PRESSURE: 133 MMHG | RESPIRATION RATE: 18 BRPM | WEIGHT: 162.5 LBS | TEMPERATURE: 98 F | DIASTOLIC BLOOD PRESSURE: 51 MMHG

## 2019-01-01 VITALS
SYSTOLIC BLOOD PRESSURE: 120 MMHG | TEMPERATURE: 97 F | HEART RATE: 71 BPM | OXYGEN SATURATION: 99 % | DIASTOLIC BLOOD PRESSURE: 64 MMHG | RESPIRATION RATE: 18 BRPM

## 2019-01-01 VITALS
OXYGEN SATURATION: 96 % | SYSTOLIC BLOOD PRESSURE: 146 MMHG | HEART RATE: 85 BPM | TEMPERATURE: 97 F | BODY MASS INDEX: 26 KG/M2 | RESPIRATION RATE: 18 BRPM | DIASTOLIC BLOOD PRESSURE: 68 MMHG | WEIGHT: 168 LBS

## 2019-01-01 VITALS
RESPIRATION RATE: 20 BRPM | TEMPERATURE: 98 F | OXYGEN SATURATION: 97 % | BODY MASS INDEX: 24 KG/M2 | SYSTOLIC BLOOD PRESSURE: 108 MMHG | SYSTOLIC BLOOD PRESSURE: 117 MMHG | OXYGEN SATURATION: 97 % | HEART RATE: 76 BPM | HEART RATE: 73 BPM | TEMPERATURE: 98 F | DIASTOLIC BLOOD PRESSURE: 55 MMHG | WEIGHT: 164.38 LBS | WEIGHT: 165.38 LBS | DIASTOLIC BLOOD PRESSURE: 55 MMHG | RESPIRATION RATE: 20 BRPM | BODY MASS INDEX: 24 KG/M2

## 2019-01-01 VITALS
OXYGEN SATURATION: 95 % | RESPIRATION RATE: 18 BRPM | TEMPERATURE: 97 F | HEART RATE: 75 BPM | DIASTOLIC BLOOD PRESSURE: 75 MMHG | SYSTOLIC BLOOD PRESSURE: 134 MMHG

## 2019-01-01 VITALS
TEMPERATURE: 98 F | OXYGEN SATURATION: 95 % | RESPIRATION RATE: 18 BRPM | HEART RATE: 75 BPM | SYSTOLIC BLOOD PRESSURE: 116 MMHG | DIASTOLIC BLOOD PRESSURE: 67 MMHG | WEIGHT: 161.63 LBS | SYSTOLIC BLOOD PRESSURE: 149 MMHG | BODY MASS INDEX: 23.14 KG/M2 | HEART RATE: 78 BPM | BODY MASS INDEX: 24 KG/M2 | DIASTOLIC BLOOD PRESSURE: 55 MMHG | WEIGHT: 165.19 LBS | HEIGHT: 70 IN | RESPIRATION RATE: 18 BRPM | TEMPERATURE: 97 F | OXYGEN SATURATION: 98 %

## 2019-01-01 VITALS
RESPIRATION RATE: 14 BRPM | OXYGEN SATURATION: 92 % | TEMPERATURE: 98 F | BODY MASS INDEX: 25 KG/M2 | HEART RATE: 76 BPM | SYSTOLIC BLOOD PRESSURE: 140 MMHG | DIASTOLIC BLOOD PRESSURE: 68 MMHG | HEIGHT: 68 IN

## 2019-01-01 VITALS
SYSTOLIC BLOOD PRESSURE: 115 MMHG | WEIGHT: 168 LBS | BODY MASS INDEX: 26 KG/M2 | HEART RATE: 77 BPM | DIASTOLIC BLOOD PRESSURE: 61 MMHG | RESPIRATION RATE: 18 BRPM | OXYGEN SATURATION: 93 %

## 2019-01-01 VITALS
RESPIRATION RATE: 18 BRPM | BODY MASS INDEX: 23 KG/M2 | OXYGEN SATURATION: 99 % | SYSTOLIC BLOOD PRESSURE: 129 MMHG | WEIGHT: 154.38 LBS | DIASTOLIC BLOOD PRESSURE: 52 MMHG | HEART RATE: 73 BPM | TEMPERATURE: 97 F

## 2019-01-01 VITALS
WEIGHT: 166.81 LBS | TEMPERATURE: 98 F | OXYGEN SATURATION: 98 % | SYSTOLIC BLOOD PRESSURE: 129 MMHG | HEART RATE: 74 BPM | BODY MASS INDEX: 25 KG/M2 | DIASTOLIC BLOOD PRESSURE: 63 MMHG | RESPIRATION RATE: 18 BRPM

## 2019-01-01 VITALS
TEMPERATURE: 98 F | HEART RATE: 66 BPM | HEIGHT: 69 IN | OXYGEN SATURATION: 99 % | RESPIRATION RATE: 16 BRPM | SYSTOLIC BLOOD PRESSURE: 123 MMHG | WEIGHT: 165.38 LBS | DIASTOLIC BLOOD PRESSURE: 50 MMHG | BODY MASS INDEX: 24.5 KG/M2

## 2019-01-01 VITALS
DIASTOLIC BLOOD PRESSURE: 82 MMHG | SYSTOLIC BLOOD PRESSURE: 126 MMHG | OXYGEN SATURATION: 95 % | RESPIRATION RATE: 18 BRPM | TEMPERATURE: 98 F | HEART RATE: 84 BPM

## 2019-01-01 VITALS
TEMPERATURE: 98 F | WEIGHT: 167.69 LBS | RESPIRATION RATE: 18 BRPM | BODY MASS INDEX: 26 KG/M2 | DIASTOLIC BLOOD PRESSURE: 54 MMHG | HEART RATE: 72 BPM | SYSTOLIC BLOOD PRESSURE: 116 MMHG | OXYGEN SATURATION: 96 %

## 2019-01-01 VITALS
HEART RATE: 68 BPM | DIASTOLIC BLOOD PRESSURE: 56 MMHG | OXYGEN SATURATION: 98 % | WEIGHT: 163 LBS | RESPIRATION RATE: 18 BRPM | SYSTOLIC BLOOD PRESSURE: 130 MMHG | TEMPERATURE: 97 F | BODY MASS INDEX: 24 KG/M2

## 2019-01-01 VITALS
DIASTOLIC BLOOD PRESSURE: 54 MMHG | HEART RATE: 64 BPM | WEIGHT: 165.38 LBS | BODY MASS INDEX: 25 KG/M2 | RESPIRATION RATE: 17 BRPM | SYSTOLIC BLOOD PRESSURE: 156 MMHG | TEMPERATURE: 98 F | OXYGEN SATURATION: 95 %

## 2019-01-01 VITALS
DIASTOLIC BLOOD PRESSURE: 64 MMHG | SYSTOLIC BLOOD PRESSURE: 145 MMHG | OXYGEN SATURATION: 97 % | HEART RATE: 74 BPM | RESPIRATION RATE: 19 BRPM | TEMPERATURE: 97 F

## 2019-01-01 VITALS
SYSTOLIC BLOOD PRESSURE: 137 MMHG | TEMPERATURE: 97 F | RESPIRATION RATE: 17 BRPM | OXYGEN SATURATION: 95 % | DIASTOLIC BLOOD PRESSURE: 58 MMHG | HEART RATE: 82 BPM

## 2019-01-01 DIAGNOSIS — I50.22 SYSTOLIC CHF, CHRONIC (HCC): ICD-10-CM

## 2019-01-01 DIAGNOSIS — N18.30 CKD (CHRONIC KIDNEY DISEASE) STAGE 3, GFR 30-59 ML/MIN (HCC): ICD-10-CM

## 2019-01-01 DIAGNOSIS — S82.831D CLOSED FRACTURE OF DISTAL END OF RIGHT FIBULA WITH ROUTINE HEALING, UNSPECIFIED FRACTURE MORPHOLOGY, SUBSEQUENT ENCOUNTER: ICD-10-CM

## 2019-01-01 DIAGNOSIS — I50.9 ACUTE ON CHRONIC CONGESTIVE HEART FAILURE, UNSPECIFIED HEART FAILURE TYPE (HCC): ICD-10-CM

## 2019-01-01 DIAGNOSIS — E03.4 HYPOTHYROIDISM DUE TO ACQUIRED ATROPHY OF THYROID: ICD-10-CM

## 2019-01-01 DIAGNOSIS — Z78.9 IMPAIRED MOBILITY AND ADLS: ICD-10-CM

## 2019-01-01 DIAGNOSIS — E86.0 DEHYDRATION: Primary | ICD-10-CM

## 2019-01-01 DIAGNOSIS — S82.892D CLOSED LEFT ANKLE FRACTURE, WITH ROUTINE HEALING, SUBSEQUENT ENCOUNTER: ICD-10-CM

## 2019-01-01 DIAGNOSIS — E11.22 TYPE 2 DIABETES MELLITUS WITH DIABETIC CHRONIC KIDNEY DISEASE (HCC): ICD-10-CM

## 2019-01-01 DIAGNOSIS — M17.0 PRIMARY OSTEOARTHRITIS OF BOTH KNEES: ICD-10-CM

## 2019-01-01 DIAGNOSIS — M10.372 ACUTE GOUT DUE TO RENAL IMPAIRMENT INVOLVING LEFT ANKLE: ICD-10-CM

## 2019-01-01 DIAGNOSIS — J18.9 PNEUMONIA: ICD-10-CM

## 2019-01-01 DIAGNOSIS — E56.9 VITAMIN DISEASE: Primary | ICD-10-CM

## 2019-01-01 DIAGNOSIS — N18.30 CHRONIC KIDNEY DISEASE, STAGE III (MODERATE) (HCC): Primary | ICD-10-CM

## 2019-01-01 DIAGNOSIS — Z74.09 IMPAIRED MOBILITY AND ADLS: ICD-10-CM

## 2019-01-01 DIAGNOSIS — I50.22 CHRONIC SYSTOLIC CONGESTIVE HEART FAILURE (HCC): ICD-10-CM

## 2019-01-01 DIAGNOSIS — I48.0 PAROXYSMAL ATRIAL FIBRILLATION (HCC): ICD-10-CM

## 2019-01-01 DIAGNOSIS — N18.30 CHRONIC KIDNEY DISEASE, STAGE III (MODERATE) (HCC): ICD-10-CM

## 2019-01-01 DIAGNOSIS — I48.0 PAROXYSMAL ATRIAL FIBRILLATION (HCC): Primary | ICD-10-CM

## 2019-01-01 DIAGNOSIS — R53.1 WEAKNESS: ICD-10-CM

## 2019-01-01 DIAGNOSIS — M10.9 GOUTY ARTHRITIS: ICD-10-CM

## 2019-01-01 DIAGNOSIS — S82.891D CLOSED FRACTURE OF RIGHT ANKLE WITH ROUTINE HEALING, SUBSEQUENT ENCOUNTER: ICD-10-CM

## 2019-01-01 DIAGNOSIS — L97.419 HEEL ULCERATION, RIGHT, WITH UNSPECIFIED SEVERITY (HCC): ICD-10-CM

## 2019-01-01 DIAGNOSIS — I10 ESSENTIAL HYPERTENSION WITH GOAL BLOOD PRESSURE LESS THAN 130/80: ICD-10-CM

## 2019-01-01 DIAGNOSIS — E11.65 TYPE 2 DIABETES MELLITUS WITH HYPERGLYCEMIA, WITHOUT LONG-TERM CURRENT USE OF INSULIN (HCC): ICD-10-CM

## 2019-01-01 DIAGNOSIS — E11.40 PAINFUL DIABETIC NEUROPATHY (HCC): ICD-10-CM

## 2019-01-01 DIAGNOSIS — N39.0 URINARY TRACT INFECTION WITHOUT HEMATURIA, SITE UNSPECIFIED: Primary | ICD-10-CM

## 2019-01-01 DIAGNOSIS — S91.301D OPEN WOUND OF RIGHT HEEL, SUBSEQUENT ENCOUNTER: ICD-10-CM

## 2019-01-01 DIAGNOSIS — E78.2 HYPERLIPEMIA, MIXED: ICD-10-CM

## 2019-01-01 DIAGNOSIS — I25.10 CORONARY ARTERY DISEASE INVOLVING NATIVE HEART WITHOUT ANGINA PECTORIS, UNSPECIFIED VESSEL OR LESION TYPE: ICD-10-CM

## 2019-01-01 DIAGNOSIS — E11.621 TYPE 2 DIABETES MELLITUS WITH STAGE 2 DECUBITUS ULCER OF HEEL (HCC): ICD-10-CM

## 2019-01-01 DIAGNOSIS — M62.81 GENERALIZED MUSCLE WEAKNESS: ICD-10-CM

## 2019-01-01 DIAGNOSIS — R53.1 GENERALIZED WEAKNESS: ICD-10-CM

## 2019-01-01 DIAGNOSIS — I10 ESSENTIAL HYPERTENSION: ICD-10-CM

## 2019-01-01 DIAGNOSIS — E56.9 VITAMIN DISEASE: ICD-10-CM

## 2019-01-01 DIAGNOSIS — E11.621 TYPE 2 DIABETES MELLITUS WITH STAGE 2 DECUBITUS ULCER OF HEEL (HCC): Primary | ICD-10-CM

## 2019-01-01 DIAGNOSIS — S92.001S OPEN DISPLACED FRACTURE OF RIGHT CALCANEUS, UNSPECIFIED PORTION OF CALCANEUS, SEQUELA: Primary | ICD-10-CM

## 2019-01-01 DIAGNOSIS — R56.9 SEIZURE (HCC): ICD-10-CM

## 2019-01-01 DIAGNOSIS — E87.1 HYPONATREMIA: ICD-10-CM

## 2019-01-01 DIAGNOSIS — E11.40 DIABETIC NEUROPATHY (HCC): ICD-10-CM

## 2019-01-01 DIAGNOSIS — K21.9 GASTROESOPHAGEAL REFLUX DISEASE WITHOUT ESOPHAGITIS: ICD-10-CM

## 2019-01-01 DIAGNOSIS — J30.2 SEASONAL ALLERGIES: ICD-10-CM

## 2019-01-01 DIAGNOSIS — I13.0 HYPERTENSIVE HEART AND CHRONIC KIDNEY DISEASE WITH HEART FAILURE AND STAGE 1 THROUGH STAGE 4 CHRONIC KIDNEY DISEASE, OR CHRONIC KIDNEY DISEASE (HCC): ICD-10-CM

## 2019-01-01 DIAGNOSIS — N18.30 UNCONTROLLED SECONDARY DIABETES MELLITUS WITH STAGE 3 CKD (GFR 30-59) (HCC): ICD-10-CM

## 2019-01-01 DIAGNOSIS — IMO0001 CONTROLLED INSULIN DEPENDENT DIABETES MELLITUS: ICD-10-CM

## 2019-01-01 DIAGNOSIS — D72.829 LEUKOCYTOSIS, UNSPECIFIED TYPE: ICD-10-CM

## 2019-01-01 DIAGNOSIS — I73.9 PAD (PERIPHERAL ARTERY DISEASE) (HCC): ICD-10-CM

## 2019-01-01 DIAGNOSIS — S82.832D CLOSED FRACTURE OF DISTAL END OF LEFT FIBULA WITH ROUTINE HEALING, UNSPECIFIED FRACTURE MORPHOLOGY, SUBSEQUENT ENCOUNTER: Primary | ICD-10-CM

## 2019-01-01 DIAGNOSIS — L89.609: ICD-10-CM

## 2019-01-01 DIAGNOSIS — I25.10 ATHEROSCLEROSIS OF NATIVE CORONARY ARTERY OF NATIVE HEART WITHOUT ANGINA PECTORIS: ICD-10-CM

## 2019-01-01 DIAGNOSIS — J30.89 ENVIRONMENTAL AND SEASONAL ALLERGIES: ICD-10-CM

## 2019-01-01 DIAGNOSIS — R60.0 LOCALIZED EDEMA: Primary | ICD-10-CM

## 2019-01-01 DIAGNOSIS — R53.81 PHYSICAL DECONDITIONING: ICD-10-CM

## 2019-01-01 DIAGNOSIS — T14.8XXD WOUND HEALING, DELAYED: ICD-10-CM

## 2019-01-01 DIAGNOSIS — S92.001B OPEN DISPLACED FRACTURE OF RIGHT CALCANEUS, UNSPECIFIED PORTION OF CALCANEUS, INITIAL ENCOUNTER: Primary | ICD-10-CM

## 2019-01-01 DIAGNOSIS — N17.9 ACUTE RENAL FAILURE SUPERIMPOSED ON CHRONIC KIDNEY DISEASE, UNSPECIFIED CKD STAGE, UNSPECIFIED ACUTE RENAL FAILURE TYPE (HCC): ICD-10-CM

## 2019-01-01 DIAGNOSIS — I48.0 PAROXYSMAL A-FIB (HCC): ICD-10-CM

## 2019-01-01 DIAGNOSIS — E13.22 UNCONTROLLED SECONDARY DIABETES MELLITUS WITH STAGE 3 CKD (GFR 30-59) (HCC): ICD-10-CM

## 2019-01-01 DIAGNOSIS — M10.00 ACUTE IDIOPATHIC GOUT, UNSPECIFIED SITE: ICD-10-CM

## 2019-01-01 DIAGNOSIS — S82.891D CLOSED FRACTURE OF RIGHT ANKLE WITH ROUTINE HEALING, SUBSEQUENT ENCOUNTER: Primary | ICD-10-CM

## 2019-01-01 DIAGNOSIS — I50.23 ACUTE ON CHRONIC SYSTOLIC CONGESTIVE HEART FAILURE (HCC): ICD-10-CM

## 2019-01-01 DIAGNOSIS — N39.0 URINARY TRACT INFECTION: ICD-10-CM

## 2019-01-01 DIAGNOSIS — IMO0001 CONTROLLED INSULIN DEPENDENT DIABETES MELLITUS: Primary | ICD-10-CM

## 2019-01-01 DIAGNOSIS — R09.02 HYPOXEMIA: Primary | ICD-10-CM

## 2019-01-01 DIAGNOSIS — E11.40 PAINFUL DIABETIC NEUROPATHY (HCC): Primary | ICD-10-CM

## 2019-01-01 DIAGNOSIS — E78.2 HYPERLIPEMIA, MIXED: Primary | ICD-10-CM

## 2019-01-01 DIAGNOSIS — I51.9 MYXEDEMA HEART DISEASE: Primary | ICD-10-CM

## 2019-01-01 DIAGNOSIS — Z09 HOSPITAL DISCHARGE FOLLOW-UP: ICD-10-CM

## 2019-01-01 DIAGNOSIS — S91.302D OPEN WOUND OF LEFT HEEL, SUBSEQUENT ENCOUNTER: ICD-10-CM

## 2019-01-01 DIAGNOSIS — I50.9 ACUTE ON CHRONIC CONGESTIVE HEART FAILURE, UNSPECIFIED HEART FAILURE TYPE (HCC): Primary | ICD-10-CM

## 2019-01-01 DIAGNOSIS — L89.602 TYPE 2 DIABETES MELLITUS WITH STAGE 2 DECUBITUS ULCER OF HEEL (HCC): Primary | ICD-10-CM

## 2019-01-01 DIAGNOSIS — R52 PAIN: ICD-10-CM

## 2019-01-01 DIAGNOSIS — S82.832A CLOSED FRACTURE OF DISTAL END OF LEFT FIBULA, UNSPECIFIED FRACTURE MORPHOLOGY, INITIAL ENCOUNTER: ICD-10-CM

## 2019-01-01 DIAGNOSIS — L89.614 STAGE IV PRESSURE ULCER OF RIGHT HEEL (HCC): Primary | ICD-10-CM

## 2019-01-01 DIAGNOSIS — L30.9 DERMATITIS: ICD-10-CM

## 2019-01-01 DIAGNOSIS — R53.83 LETHARGY: Primary | ICD-10-CM

## 2019-01-01 DIAGNOSIS — D72.829 LEUKOCYTOSIS, UNSPECIFIED TYPE: Primary | ICD-10-CM

## 2019-01-01 DIAGNOSIS — R52 PAIN AGGRAVATED BY STANDING: ICD-10-CM

## 2019-01-01 DIAGNOSIS — I34.0 MITRAL VALVE INSUFFICIENCY, UNSPECIFIED ETIOLOGY: ICD-10-CM

## 2019-01-01 DIAGNOSIS — N39.0 UTI (URINARY TRACT INFECTION): Primary | ICD-10-CM

## 2019-01-01 DIAGNOSIS — E03.9 MYXEDEMA HEART DISEASE: Primary | ICD-10-CM

## 2019-01-01 DIAGNOSIS — N18.30 STAGE 3 CHRONIC KIDNEY DISEASE (HCC): ICD-10-CM

## 2019-01-01 DIAGNOSIS — E11.40 DIABETIC NEUROPATHY (HCC): Primary | ICD-10-CM

## 2019-01-01 DIAGNOSIS — R60.0 LOCALIZED EDEMA: ICD-10-CM

## 2019-01-01 DIAGNOSIS — R53.1 WEAKNESS GENERALIZED: ICD-10-CM

## 2019-01-01 DIAGNOSIS — S92.001S OPEN DISPLACED FRACTURE OF RIGHT CALCANEUS, UNSPECIFIED PORTION OF CALCANEUS, SEQUELA: ICD-10-CM

## 2019-01-01 DIAGNOSIS — E11.65 TYPE 2 DIABETES MELLITUS WITH HYPERGLYCEMIA, WITHOUT LONG-TERM CURRENT USE OF INSULIN (HCC): Primary | ICD-10-CM

## 2019-01-01 DIAGNOSIS — L89.313 STAGE III PRESSURE ULCER OF RIGHT BUTTOCK (HCC): ICD-10-CM

## 2019-01-01 DIAGNOSIS — N18.9 ACUTE RENAL FAILURE SUPERIMPOSED ON CHRONIC KIDNEY DISEASE, UNSPECIFIED CKD STAGE, UNSPECIFIED ACUTE RENAL FAILURE TYPE (HCC): ICD-10-CM

## 2019-01-01 DIAGNOSIS — J18.9 PNEUMONIA DUE TO INFECTIOUS ORGANISM, UNSPECIFIED LATERALITY, UNSPECIFIED PART OF LUNG: ICD-10-CM

## 2019-01-01 DIAGNOSIS — E13.65 UNCONTROLLED SECONDARY DIABETES MELLITUS WITH STAGE 3 CKD (GFR 30-59) (HCC): ICD-10-CM

## 2019-01-01 DIAGNOSIS — Z02.9 ENCOUNTERS FOR UNSPECIFIED ADMINISTRATIVE PURPOSE: ICD-10-CM

## 2019-01-01 DIAGNOSIS — I50.22 CHRONIC SYSTOLIC CHF (CONGESTIVE HEART FAILURE) (HCC): ICD-10-CM

## 2019-01-01 DIAGNOSIS — Z16.12 ESBL (EXTENDED SPECTRUM BETA-LACTAMASE) PRODUCING BACTERIA INFECTION: ICD-10-CM

## 2019-01-01 DIAGNOSIS — I27.20 PULMONARY HTN (HCC): ICD-10-CM

## 2019-01-01 DIAGNOSIS — A49.9 ESBL (EXTENDED SPECTRUM BETA-LACTAMASE) PRODUCING BACTERIA INFECTION: ICD-10-CM

## 2019-01-01 DIAGNOSIS — S82.64XD CLOSED NONDISPLACED FRACTURE OF LATERAL MALLEOLUS OF RIGHT FIBULA WITH ROUTINE HEALING, SUBSEQUENT ENCOUNTER: Primary | ICD-10-CM

## 2019-01-01 DIAGNOSIS — L89.613 PRESSURE ULCER OF RIGHT HEEL, STAGE 3 (HCC): ICD-10-CM

## 2019-01-01 DIAGNOSIS — Z86.73 HISTORY OF STROKE: ICD-10-CM

## 2019-01-01 DIAGNOSIS — R09.89 BIBASILAR CRACKLES: ICD-10-CM

## 2019-01-01 DIAGNOSIS — E87.1 HYPONATREMIA: Primary | ICD-10-CM

## 2019-01-01 DIAGNOSIS — N18.9 CKD (CHRONIC KIDNEY DISEASE): Primary | ICD-10-CM

## 2019-01-01 DIAGNOSIS — R79.89 ABNORMAL TSH: Primary | ICD-10-CM

## 2019-01-01 DIAGNOSIS — E08.40 DIABETES MELLITUS DUE TO UNDERLYING CONDITION WITH DIABETIC NEUROPATHY, WITHOUT LONG-TERM CURRENT USE OF INSULIN (HCC): ICD-10-CM

## 2019-01-01 DIAGNOSIS — N30.00 ACUTE CYSTITIS WITHOUT HEMATURIA: Primary | ICD-10-CM

## 2019-01-01 DIAGNOSIS — L89.602 TYPE 2 DIABETES MELLITUS WITH STAGE 2 DECUBITUS ULCER OF HEEL (HCC): ICD-10-CM

## 2019-01-01 DIAGNOSIS — M00.9 CHRONIC INFECTION OF KNEE (HCC): ICD-10-CM

## 2019-01-01 DIAGNOSIS — N39.0 URINARY TRACT INFECTION WITHOUT HEMATURIA, SITE UNSPECIFIED: ICD-10-CM

## 2019-01-01 DIAGNOSIS — S82.64XD CLOSED NONDISPLACED FRACTURE OF LATERAL MALLEOLUS OF RIGHT FIBULA WITH ROUTINE HEALING, SUBSEQUENT ENCOUNTER: ICD-10-CM

## 2019-01-01 DIAGNOSIS — S82.891A CLOSED FRACTURE OF RIGHT ANKLE, INITIAL ENCOUNTER: ICD-10-CM

## 2019-01-01 DIAGNOSIS — M10.9 GOUT: ICD-10-CM

## 2019-01-01 DIAGNOSIS — I50.23 ACUTE ON CHRONIC SYSTOLIC CONGESTIVE HEART FAILURE (HCC): Primary | ICD-10-CM

## 2019-01-01 DIAGNOSIS — Z22.39 ESBL E. COLI CARRIER: Primary | ICD-10-CM

## 2019-01-01 DIAGNOSIS — E11.00 TYPE 2 DIABETES MELLITUS WITH HYPEROSMOLARITY WITHOUT COMA, WITHOUT LONG-TERM CURRENT USE OF INSULIN (HCC): ICD-10-CM

## 2019-01-01 DIAGNOSIS — I50.42 CHRONIC COMBINED SYSTOLIC (CONGESTIVE) AND DIASTOLIC (CONGESTIVE) HEART FAILURE (HCC): ICD-10-CM

## 2019-01-01 DIAGNOSIS — I50.22 CHRONIC SYSTOLIC HEART FAILURE (HCC): ICD-10-CM

## 2019-01-01 DIAGNOSIS — Z48.89 OTHER SPECIFIED AFTERCARE FOLLOWING SURGERY: ICD-10-CM

## 2019-01-01 DIAGNOSIS — E86.0 DEHYDRATION: ICD-10-CM

## 2019-01-01 DIAGNOSIS — M79.10 MYALGIA: ICD-10-CM

## 2019-01-01 DIAGNOSIS — S82.892D CLOSED FRACTURE DISLOCATION OF LEFT ANKLE WITH ROUTINE HEALING, SUBSEQUENT ENCOUNTER: ICD-10-CM

## 2019-01-01 DIAGNOSIS — R79.89 ELEVATED SERUM CREATININE: ICD-10-CM

## 2019-01-01 DIAGNOSIS — IMO0002 UNCONTROLLED SECONDARY DIABETES MELLITUS WITH STAGE 3 CKD (GFR 30-59): Primary | ICD-10-CM

## 2019-01-01 DIAGNOSIS — R79.89 AZOTEMIA: ICD-10-CM

## 2019-01-01 DIAGNOSIS — I10 ESSENTIAL HYPERTENSION WITH GOAL BLOOD PRESSURE LESS THAN 130/80: Primary | ICD-10-CM

## 2019-01-01 DIAGNOSIS — R13.10 DYSPHAGIA, UNSPECIFIED TYPE: Primary | ICD-10-CM

## 2019-01-01 DIAGNOSIS — T17.908A ASPIRATION INTO AIRWAY, INITIAL ENCOUNTER: ICD-10-CM

## 2019-01-01 DIAGNOSIS — IMO0002 UNCONTROLLED SECONDARY DIABETES MELLITUS WITH STAGE 3 CKD (GFR 30-59): ICD-10-CM

## 2019-01-01 DIAGNOSIS — J30.89 ENVIRONMENTAL AND SEASONAL ALLERGIES: Primary | ICD-10-CM

## 2019-01-01 DIAGNOSIS — R26.2 CANNOT WALK: Primary | ICD-10-CM

## 2019-01-01 DIAGNOSIS — E87.5 HYPERKALEMIA: ICD-10-CM

## 2019-01-01 DIAGNOSIS — R21 RASH OF GROIN: Primary | ICD-10-CM

## 2019-01-01 DIAGNOSIS — R09.02 HYPOXEMIA: ICD-10-CM

## 2019-01-01 LAB
ALBUMIN SERPL-MCNC: 2.2 G/DL (ref 3.4–5)
ALBUMIN SERPL-MCNC: 2.2 G/DL (ref 3.4–5)
ALBUMIN SERPL-MCNC: 2.4 G/DL (ref 3.4–5)
ALBUMIN SERPL-MCNC: 2.4 G/DL (ref 3.4–5)
ALBUMIN SERPL-MCNC: 2.7 G/DL (ref 3.4–5)
ALBUMIN SERPL-MCNC: 2.8 G/DL (ref 3.4–5)
ALBUMIN SERPL-MCNC: 2.8 G/DL (ref 3.4–5)
ALBUMIN/GLOB SERPL: 0.5 {RATIO} (ref 1–2)
ALBUMIN/GLOB SERPL: 0.6 {RATIO} (ref 1–2)
ALP LIVER SERPL-CCNC: 102 U/L (ref 55–142)
ALP LIVER SERPL-CCNC: 106 U/L (ref 55–142)
ALP LIVER SERPL-CCNC: 112 U/L (ref 55–142)
ALP LIVER SERPL-CCNC: 114 U/L (ref 55–142)
ALP LIVER SERPL-CCNC: 93 U/L (ref 55–142)
ALP LIVER SERPL-CCNC: 96 U/L (ref 55–142)
ALP LIVER SERPL-CCNC: 97 U/L (ref 55–142)
ALT SERPL-CCNC: 13 U/L (ref 13–56)
ALT SERPL-CCNC: 16 U/L (ref 13–56)
ALT SERPL-CCNC: 20 U/L (ref 13–56)
ALT SERPL-CCNC: 22 U/L (ref 13–56)
ALT SERPL-CCNC: 22 U/L (ref 13–56)
ALT SERPL-CCNC: 28 U/L (ref 13–56)
ALT SERPL-CCNC: 45 U/L (ref 13–56)
ANION GAP SERPL CALC-SCNC: 2 MMOL/L (ref 0–18)
ANION GAP SERPL CALC-SCNC: 5 MMOL/L (ref 0–18)
ANION GAP SERPL CALC-SCNC: 5 MMOL/L (ref 0–18)
ANION GAP SERPL CALC-SCNC: 6 MMOL/L (ref 0–18)
ANION GAP SERPL CALC-SCNC: 7 MMOL/L (ref 0–18)
AST SERPL-CCNC: 20 U/L (ref 15–37)
AST SERPL-CCNC: 23 U/L (ref 15–37)
AST SERPL-CCNC: 28 U/L (ref 15–37)
AST SERPL-CCNC: 29 U/L (ref 15–37)
AST SERPL-CCNC: 30 U/L (ref 15–37)
AST SERPL-CCNC: 36 U/L (ref 15–37)
AST SERPL-CCNC: 48 U/L (ref 15–37)
BASOPHILS # BLD AUTO: 0.05 X10(3) UL (ref 0–0.2)
BASOPHILS # BLD AUTO: 0.05 X10(3) UL (ref 0–0.2)
BASOPHILS # BLD AUTO: 0.06 X10(3) UL (ref 0–0.2)
BASOPHILS # BLD AUTO: 0.07 X10(3) UL (ref 0–0.2)
BASOPHILS # BLD AUTO: 0.08 X10(3) UL (ref 0–0.2)
BASOPHILS # BLD AUTO: 0.09 X10(3) UL (ref 0–0.2)
BASOPHILS NFR BLD AUTO: 0.4 %
BASOPHILS NFR BLD AUTO: 0.5 %
BASOPHILS NFR BLD AUTO: 0.6 %
BASOPHILS NFR BLD AUTO: 0.6 %
BASOPHILS NFR BLD AUTO: 0.7 %
BASOPHILS NFR BLD AUTO: 0.8 %
BASOPHILS NFR BLD AUTO: 0.8 %
BILIRUB SERPL-MCNC: 0.2 MG/DL (ref 0.1–2)
BILIRUB SERPL-MCNC: 0.3 MG/DL (ref 0.1–2)
BILIRUB SERPL-MCNC: 0.4 MG/DL (ref 0.1–2)
BILIRUB SERPL-MCNC: 0.4 MG/DL (ref 0.1–2)
BILIRUB SERPL-MCNC: 0.5 MG/DL (ref 0.1–2)
BILIRUB UR QL STRIP.AUTO: NEGATIVE
BUN BLD-MCNC: 35 MG/DL (ref 7–18)
BUN BLD-MCNC: 69 MG/DL (ref 7–18)
BUN BLD-MCNC: 71 MG/DL (ref 7–18)
BUN BLD-MCNC: 74 MG/DL (ref 7–18)
BUN BLD-MCNC: 76 MG/DL (ref 7–18)
BUN BLD-MCNC: 76 MG/DL (ref 7–18)
BUN BLD-MCNC: 78 MG/DL (ref 7–18)
BUN BLD-MCNC: 83 MG/DL (ref 7–18)
BUN BLD-MCNC: 86 MG/DL (ref 7–18)
BUN BLD-MCNC: 86 MG/DL (ref 7–18)
BUN BLD-MCNC: 87 MG/DL (ref 7–18)
BUN BLD-MCNC: 89 MG/DL (ref 7–18)
BUN BLD-MCNC: 92 MG/DL (ref 7–18)
BUN BLD-MCNC: 94 MG/DL (ref 7–18)
BUN BLD-MCNC: 95 MG/DL (ref 7–18)
BUN/CREAT SERPL: 29.9 (ref 10–20)
BUN/CREAT SERPL: 47.2 (ref 10–20)
BUN/CREAT SERPL: 49.7 (ref 10–20)
BUN/CREAT SERPL: 50 (ref 10–20)
BUN/CREAT SERPL: 53 (ref 10–20)
BUN/CREAT SERPL: 53.2 (ref 10–20)
BUN/CREAT SERPL: 55.3 (ref 10–20)
BUN/CREAT SERPL: 56.9 (ref 10–20)
BUN/CREAT SERPL: 57 (ref 10–20)
BUN/CREAT SERPL: 58.3 (ref 10–20)
BUN/CREAT SERPL: 62.4 (ref 10–20)
BUN/CREAT SERPL: 63.1 (ref 10–20)
BUN/CREAT SERPL: 64.2 (ref 10–20)
BUN/CREAT SERPL: 64.4 (ref 10–20)
BUN/CREAT SERPL: 64.7 (ref 10–20)
CALCIUM BLD-MCNC: 8.5 MG/DL (ref 8.5–10.1)
CALCIUM BLD-MCNC: 8.5 MG/DL (ref 8.5–10.1)
CALCIUM BLD-MCNC: 8.6 MG/DL (ref 8.5–10.1)
CALCIUM BLD-MCNC: 8.8 MG/DL (ref 8.5–10.1)
CALCIUM BLD-MCNC: 9 MG/DL (ref 8.5–10.1)
CALCIUM BLD-MCNC: 9.1 MG/DL (ref 8.5–10.1)
CALCIUM BLD-MCNC: 9.2 MG/DL (ref 8.5–10.1)
CALCIUM BLD-MCNC: 9.3 MG/DL (ref 8.5–10.1)
CALCIUM BLD-MCNC: 9.3 MG/DL (ref 8.5–10.1)
CALCIUM BLD-MCNC: 9.4 MG/DL (ref 8.5–10.1)
CALCIUM BLD-MCNC: 9.8 MG/DL (ref 8.5–10.1)
CHLORIDE SERPL-SCNC: 101 MMOL/L (ref 98–112)
CHLORIDE SERPL-SCNC: 102 MMOL/L (ref 98–112)
CHLORIDE SERPL-SCNC: 104 MMOL/L (ref 98–112)
CHLORIDE SERPL-SCNC: 105 MMOL/L (ref 98–112)
CHLORIDE SERPL-SCNC: 105 MMOL/L (ref 98–112)
CHLORIDE SERPL-SCNC: 107 MMOL/L (ref 98–112)
CHLORIDE SERPL-SCNC: 109 MMOL/L (ref 98–112)
CHLORIDE SERPL-SCNC: 109 MMOL/L (ref 98–112)
CHLORIDE SERPL-SCNC: 110 MMOL/L (ref 98–112)
CHLORIDE SERPL-SCNC: 97 MMOL/L (ref 98–107)
CHLORIDE SERPL-SCNC: 99 MMOL/L (ref 98–112)
CHOLEST SMN-MCNC: 161 MG/DL (ref ?–200)
CLARITY UR REFRACT.AUTO: CLEAR
CLARITY UR REFRACT.AUTO: CLEAR
CO2 SERPL-SCNC: 26 MMOL/L (ref 21–32)
CO2 SERPL-SCNC: 27 MMOL/L (ref 21–32)
CO2 SERPL-SCNC: 27 MMOL/L (ref 21–32)
CO2 SERPL-SCNC: 28 MMOL/L (ref 21–32)
CO2 SERPL-SCNC: 29 MMOL/L (ref 21–32)
CO2 SERPL-SCNC: 30 MMOL/L (ref 21–32)
CO2 SERPL-SCNC: 34 MMOL/L (ref 21–32)
COLOR UR AUTO: YELLOW
CREAT BLD-MCNC: 1.17 MG/DL (ref 0.55–1.02)
CREAT BLD-MCNC: 1.21 MG/DL (ref 0.55–1.02)
CREAT BLD-MCNC: 1.25 MG/DL (ref 0.55–1.02)
CREAT BLD-MCNC: 1.27 MG/DL (ref 0.55–1.02)
CREAT BLD-MCNC: 1.33 MG/DL (ref 0.55–1.02)
CREAT BLD-MCNC: 1.34 MG/DL (ref 0.55–1.02)
CREAT BLD-MCNC: 1.34 MG/DL (ref 0.55–1.02)
CREAT BLD-MCNC: 1.35 MG/DL (ref 0.55–1.02)
CREAT BLD-MCNC: 1.49 MG/DL (ref 0.55–1.02)
CREAT BLD-MCNC: 1.53 MG/DL (ref 0.55–1.02)
CREAT BLD-MCNC: 1.61 MG/DL (ref 0.55–1.02)
CREAT BLD-MCNC: 1.61 MG/DL (ref 0.55–1.02)
CREAT BLD-MCNC: 1.66 MG/DL (ref 0.55–1.02)
CREAT BLD-MCNC: 1.67 MG/DL (ref 0.55–1.02)
CREAT BLD-MCNC: 1.73 MG/DL (ref 0.55–1.02)
CREAT UR-SCNC: 49 MG/DL
CRP SERPL-MCNC: 3.82 MG/DL (ref ?–0.3)
CRP SERPL-MCNC: 4.73 MG/DL (ref ?–0.3)
CRP SERPL-MCNC: 5.67 MG/DL (ref ?–0.3)
DEPRECATED RDW RBC AUTO: 49.4 FL (ref 35.1–46.3)
DEPRECATED RDW RBC AUTO: 53.7 FL (ref 35.1–46.3)
DEPRECATED RDW RBC AUTO: 54.3 FL (ref 35.1–46.3)
DEPRECATED RDW RBC AUTO: 54.8 FL (ref 35.1–46.3)
DEPRECATED RDW RBC AUTO: 54.9 FL (ref 35.1–46.3)
DEPRECATED RDW RBC AUTO: 54.9 FL (ref 35.1–46.3)
DEPRECATED RDW RBC AUTO: 55.6 FL (ref 35.1–46.3)
DEPRECATED RDW RBC AUTO: 55.8 FL (ref 35.1–46.3)
DEPRECATED RDW RBC AUTO: 56.5 FL (ref 35.1–46.3)
DEPRECATED RDW RBC AUTO: 57.2 FL (ref 35.1–46.3)
DEPRECATED RDW RBC AUTO: 57.3 FL (ref 35.1–46.3)
DEPRECATED RDW RBC AUTO: 57.6 FL (ref 35.1–46.3)
DEPRECATED RDW RBC AUTO: 60.3 FL (ref 35.1–46.3)
EOSINOPHIL # BLD AUTO: 0 X10(3) UL (ref 0–0.7)
EOSINOPHIL NFR BLD AUTO: 0 %
ERYTHROCYTE [DISTWIDTH] IN BLOOD BY AUTOMATED COUNT: 14.3 % (ref 11–15)
ERYTHROCYTE [DISTWIDTH] IN BLOOD BY AUTOMATED COUNT: 15.4 % (ref 11–15)
ERYTHROCYTE [DISTWIDTH] IN BLOOD BY AUTOMATED COUNT: 15.5 % (ref 11–15)
ERYTHROCYTE [DISTWIDTH] IN BLOOD BY AUTOMATED COUNT: 15.6 % (ref 11–15)
ERYTHROCYTE [DISTWIDTH] IN BLOOD BY AUTOMATED COUNT: 15.7 % (ref 11–15)
ERYTHROCYTE [DISTWIDTH] IN BLOOD BY AUTOMATED COUNT: 15.8 % (ref 11–15)
ERYTHROCYTE [DISTWIDTH] IN BLOOD BY AUTOMATED COUNT: 15.9 % (ref 11–15)
ERYTHROCYTE [DISTWIDTH] IN BLOOD BY AUTOMATED COUNT: 16 % (ref 11–15)
ERYTHROCYTE [DISTWIDTH] IN BLOOD BY AUTOMATED COUNT: 16.1 % (ref 11–15)
ERYTHROCYTE [DISTWIDTH] IN BLOOD BY AUTOMATED COUNT: 16.2 % (ref 11–15)
ERYTHROCYTE [DISTWIDTH] IN BLOOD BY AUTOMATED COUNT: 16.6 % (ref 11–15)
EST. AVERAGE GLUCOSE BLD GHB EST-MCNC: 128 MG/DL (ref 68–126)
EST. AVERAGE GLUCOSE BLD GHB EST-MCNC: 163 MG/DL (ref 68–126)
GLOBULIN PLAS-MCNC: 4.1 G/DL (ref 2.8–4.4)
GLOBULIN PLAS-MCNC: 4.1 G/DL (ref 2.8–4.4)
GLOBULIN PLAS-MCNC: 4.3 G/DL (ref 2.8–4.4)
GLOBULIN PLAS-MCNC: 4.6 G/DL (ref 2.8–4.4)
GLOBULIN PLAS-MCNC: 4.8 G/DL (ref 2.8–4.4)
GLOBULIN PLAS-MCNC: 5.1 G/DL (ref 2.8–4.4)
GLOBULIN PLAS-MCNC: 5.1 G/DL (ref 2.8–4.4)
GLUCOSE BLD-MCNC: 115 MG/DL (ref 70–99)
GLUCOSE BLD-MCNC: 122 MG/DL (ref 70–99)
GLUCOSE BLD-MCNC: 127 MG/DL (ref 70–99)
GLUCOSE BLD-MCNC: 129 MG/DL (ref 70–99)
GLUCOSE BLD-MCNC: 131 MG/DL (ref 70–99)
GLUCOSE BLD-MCNC: 132 MG/DL (ref 70–99)
GLUCOSE BLD-MCNC: 134 MG/DL (ref 70–99)
GLUCOSE BLD-MCNC: 141 MG/DL (ref 70–99)
GLUCOSE BLD-MCNC: 144 MG/DL (ref 70–99)
GLUCOSE BLD-MCNC: 146 MG/DL (ref 70–99)
GLUCOSE BLD-MCNC: 146 MG/DL (ref 70–99)
GLUCOSE BLD-MCNC: 150 MG/DL (ref 70–99)
GLUCOSE BLD-MCNC: 151 MG/DL (ref 70–99)
GLUCOSE BLD-MCNC: 153 MG/DL (ref 70–99)
GLUCOSE BLD-MCNC: 162 MG/DL (ref 70–99)
GLUCOSE BLD-MCNC: 172 MG/DL (ref 70–99)
GLUCOSE BLD-MCNC: 188 MG/DL (ref 70–99)
GLUCOSE BLD-MCNC: 189 MG/DL (ref 70–99)
GLUCOSE BLD-MCNC: 189 MG/DL (ref 70–99)
GLUCOSE BLD-MCNC: 190 MG/DL (ref 70–99)
GLUCOSE BLD-MCNC: 194 MG/DL (ref 70–99)
GLUCOSE BLD-MCNC: 196 MG/DL (ref 70–99)
GLUCOSE BLD-MCNC: 197 MG/DL (ref 70–99)
GLUCOSE BLD-MCNC: 198 MG/DL (ref 70–99)
GLUCOSE BLD-MCNC: 200 MG/DL (ref 70–99)
GLUCOSE BLD-MCNC: 203 MG/DL (ref 70–99)
GLUCOSE BLD-MCNC: 207 MG/DL (ref 70–99)
GLUCOSE BLD-MCNC: 211 MG/DL (ref 70–99)
GLUCOSE BLD-MCNC: 214 MG/DL (ref 70–99)
GLUCOSE BLD-MCNC: 225 MG/DL (ref 70–99)
GLUCOSE BLD-MCNC: 228 MG/DL (ref 70–99)
GLUCOSE BLD-MCNC: 235 MG/DL (ref 70–99)
GLUCOSE BLD-MCNC: 240 MG/DL (ref 70–99)
GLUCOSE BLD-MCNC: 259 MG/DL (ref 70–99)
GLUCOSE BLD-MCNC: 280 MG/DL (ref 70–99)
GLUCOSE BLD-MCNC: 31 MG/DL (ref 70–99)
GLUCOSE BLD-MCNC: 354 MG/DL (ref 70–99)
GLUCOSE BLD-MCNC: 59 MG/DL (ref 70–99)
GLUCOSE BLD-MCNC: 72 MG/DL (ref 70–99)
GLUCOSE BLD-MCNC: 89 MG/DL (ref 70–99)
GLUCOSE BLD-MCNC: 96 MG/DL (ref 70–99)
GLUCOSE UR STRIP.AUTO-MCNC: NEGATIVE MG/DL
HBA1C MFR BLD HPLC: 6.1 % (ref ?–5.7)
HBA1C MFR BLD HPLC: 7.3 % (ref ?–5.7)
HCT VFR BLD AUTO: 31.3 % (ref 35–48)
HCT VFR BLD AUTO: 33.1 % (ref 35–48)
HCT VFR BLD AUTO: 33.3 % (ref 35–48)
HCT VFR BLD AUTO: 34 % (ref 35–48)
HCT VFR BLD AUTO: 34.1 % (ref 35–48)
HCT VFR BLD AUTO: 34.7 % (ref 35–48)
HCT VFR BLD AUTO: 35.2 % (ref 35–48)
HCT VFR BLD AUTO: 35.7 % (ref 35–48)
HCT VFR BLD AUTO: 36.2 % (ref 35–48)
HCT VFR BLD AUTO: 36.8 % (ref 35–48)
HCT VFR BLD AUTO: 36.8 % (ref 35–48)
HCT VFR BLD AUTO: 38 % (ref 35–48)
HCT VFR BLD AUTO: 40.7 % (ref 35–48)
HDLC SERPL-MCNC: 45 MG/DL (ref 40–59)
HGB BLD-MCNC: 10 G/DL (ref 12–16)
HGB BLD-MCNC: 10.4 G/DL (ref 12–16)
HGB BLD-MCNC: 10.5 G/DL (ref 12–16)
HGB BLD-MCNC: 10.6 G/DL (ref 12–16)
HGB BLD-MCNC: 10.7 G/DL (ref 12–16)
HGB BLD-MCNC: 11.1 G/DL (ref 12–16)
HGB BLD-MCNC: 11.1 G/DL (ref 12–16)
HGB BLD-MCNC: 11.2 G/DL (ref 12–16)
HGB BLD-MCNC: 11.6 G/DL (ref 12–16)
HGB BLD-MCNC: 11.8 G/DL (ref 12–16)
HGB BLD-MCNC: 12.1 G/DL (ref 12–16)
HGB BLD-MCNC: 12.2 G/DL (ref 12–16)
HGB BLD-MCNC: 12.5 G/DL (ref 12–16)
HYALINE CASTS #/AREA URNS AUTO: PRESENT /LPF
IMM GRANULOCYTES # BLD AUTO: 0.04 X10(3) UL (ref 0–1)
IMM GRANULOCYTES # BLD AUTO: 0.04 X10(3) UL (ref 0–1)
IMM GRANULOCYTES # BLD AUTO: 0.05 X10(3) UL (ref 0–1)
IMM GRANULOCYTES # BLD AUTO: 0.06 X10(3) UL (ref 0–1)
IMM GRANULOCYTES # BLD AUTO: 0.07 X10(3) UL (ref 0–1)
IMM GRANULOCYTES # BLD AUTO: 0.09 X10(3) UL (ref 0–1)
IMM GRANULOCYTES # BLD AUTO: 0.09 X10(3) UL (ref 0–1)
IMM GRANULOCYTES NFR BLD: 0.3 %
IMM GRANULOCYTES NFR BLD: 0.4 %
IMM GRANULOCYTES NFR BLD: 0.4 %
IMM GRANULOCYTES NFR BLD: 0.5 %
IMM GRANULOCYTES NFR BLD: 0.6 %
IMM GRANULOCYTES NFR BLD: 0.7 %
IMM GRANULOCYTES NFR BLD: 0.7 %
KETONES UR STRIP.AUTO-MCNC: NEGATIVE MG/DL
LDLC SERPL CALC-MCNC: 95 MG/DL (ref ?–100)
LEUKOCYTE ALKALINE PHOSPHATASE: 102
LEUKOCYTE ESTERASE UR QL STRIP.AUTO: NEGATIVE
LYMPHOCYTES # BLD AUTO: 2.44 X10(3) UL (ref 1–4)
LYMPHOCYTES # BLD AUTO: 3.23 X10(3) UL (ref 1–4)
LYMPHOCYTES # BLD AUTO: 3.6 X10(3) UL (ref 1–4)
LYMPHOCYTES # BLD AUTO: 3.69 X10(3) UL (ref 1–4)
LYMPHOCYTES # BLD AUTO: 3.73 X10(3) UL (ref 1–4)
LYMPHOCYTES # BLD AUTO: 3.89 X10(3) UL (ref 1–4)
LYMPHOCYTES # BLD AUTO: 3.93 X10(3) UL (ref 1–4)
LYMPHOCYTES # BLD AUTO: 3.99 X10(3) UL (ref 1–4)
LYMPHOCYTES # BLD AUTO: 4.28 X10(3) UL (ref 1–4)
LYMPHOCYTES # BLD AUTO: 4.5 X10(3) UL (ref 1–4)
LYMPHOCYTES # BLD AUTO: 4.86 X10(3) UL (ref 1–4)
LYMPHOCYTES # BLD AUTO: 5.03 X10(3) UL (ref 1–4)
LYMPHOCYTES NFR BLD AUTO: 19.5 %
LYMPHOCYTES NFR BLD AUTO: 26.6 %
LYMPHOCYTES NFR BLD AUTO: 28.1 %
LYMPHOCYTES NFR BLD AUTO: 29.4 %
LYMPHOCYTES NFR BLD AUTO: 32.9 %
LYMPHOCYTES NFR BLD AUTO: 34.4 %
LYMPHOCYTES NFR BLD AUTO: 35.9 %
LYMPHOCYTES NFR BLD AUTO: 36.1 %
LYMPHOCYTES NFR BLD AUTO: 36.3 %
LYMPHOCYTES NFR BLD AUTO: 37.6 %
LYMPHOCYTES NFR BLD AUTO: 38.7 %
LYMPHOCYTES NFR BLD AUTO: 42.6 %
M PROTEIN MFR SERPL ELPH: 6.3 G/DL (ref 6.4–8.2)
M PROTEIN MFR SERPL ELPH: 6.5 G/DL (ref 6.4–8.2)
M PROTEIN MFR SERPL ELPH: 6.5 G/DL (ref 6.4–8.2)
M PROTEIN MFR SERPL ELPH: 7.3 G/DL (ref 6.4–8.2)
M PROTEIN MFR SERPL ELPH: 7.5 G/DL (ref 6.4–8.2)
M PROTEIN MFR SERPL ELPH: 7.6 G/DL (ref 6.4–8.2)
M PROTEIN MFR SERPL ELPH: 7.9 G/DL (ref 6.4–8.2)
MCH RBC QN AUTO: 29.7 PG (ref 26–34)
MCH RBC QN AUTO: 30 PG (ref 26–34)
MCH RBC QN AUTO: 30.1 PG (ref 26–34)
MCH RBC QN AUTO: 30.2 PG (ref 26–34)
MCH RBC QN AUTO: 30.2 PG (ref 26–34)
MCH RBC QN AUTO: 30.4 PG (ref 26–34)
MCH RBC QN AUTO: 30.5 PG (ref 26–34)
MCH RBC QN AUTO: 30.7 PG (ref 26–34)
MCH RBC QN AUTO: 30.8 PG (ref 26–34)
MCH RBC QN AUTO: 31.1 PG (ref 26–34)
MCH RBC QN AUTO: 31.5 PG (ref 26–34)
MCHC RBC AUTO-ENTMCNC: 30.7 G/DL (ref 31–37)
MCHC RBC AUTO-ENTMCNC: 30.7 G/DL (ref 31–37)
MCHC RBC AUTO-ENTMCNC: 30.8 G/DL (ref 31–37)
MCHC RBC AUTO-ENTMCNC: 31.2 G/DL (ref 31–37)
MCHC RBC AUTO-ENTMCNC: 31.5 G/DL (ref 31–37)
MCHC RBC AUTO-ENTMCNC: 31.8 G/DL (ref 31–37)
MCHC RBC AUTO-ENTMCNC: 31.9 G/DL (ref 31–37)
MCHC RBC AUTO-ENTMCNC: 32 G/DL (ref 31–37)
MCHC RBC AUTO-ENTMCNC: 32.3 G/DL (ref 31–37)
MCHC RBC AUTO-ENTMCNC: 33.1 G/DL (ref 31–37)
MCHC RBC AUTO-ENTMCNC: 33.2 G/DL (ref 31–37)
MCV RBC AUTO: 93.9 FL (ref 80–100)
MCV RBC AUTO: 94.6 FL (ref 80–100)
MCV RBC AUTO: 95.1 FL (ref 80–100)
MCV RBC AUTO: 95.1 FL (ref 80–100)
MCV RBC AUTO: 95.7 FL (ref 80–100)
MCV RBC AUTO: 95.9 FL (ref 80–100)
MCV RBC AUTO: 96 FL (ref 80–100)
MCV RBC AUTO: 96 FL (ref 80–100)
MCV RBC AUTO: 96.5 FL (ref 80–100)
MCV RBC AUTO: 96.7 FL (ref 80–100)
MCV RBC AUTO: 97.4 FL (ref 80–100)
MCV RBC AUTO: 97.6 FL (ref 80–100)
MCV RBC AUTO: 99.2 FL (ref 80–100)
MICROALBUMIN UR-MCNC: 17.4 MG/DL
MICROALBUMIN/CREAT 24H UR-RTO: 355.1 UG/MG (ref ?–30)
MONOCYTES # BLD AUTO: 1.09 X10(3) UL (ref 0.1–1)
MONOCYTES # BLD AUTO: 1.12 X10(3) UL (ref 0.1–1)
MONOCYTES # BLD AUTO: 1.14 X10(3) UL (ref 0.1–1)
MONOCYTES # BLD AUTO: 1.22 X10(3) UL (ref 0.1–1)
MONOCYTES # BLD AUTO: 1.28 X10(3) UL (ref 0.1–1)
MONOCYTES # BLD AUTO: 1.29 X10(3) UL (ref 0.1–1)
MONOCYTES # BLD AUTO: 1.29 X10(3) UL (ref 0.1–1)
MONOCYTES # BLD AUTO: 1.3 X10(3) UL (ref 0.1–1)
MONOCYTES # BLD AUTO: 1.38 X10(3) UL (ref 0.1–1)
MONOCYTES # BLD AUTO: 1.46 X10(3) UL (ref 0.1–1)
MONOCYTES # BLD AUTO: 1.6 X10(3) UL (ref 0.1–1)
MONOCYTES # BLD AUTO: 1.62 X10(3) UL (ref 0.1–1)
MONOCYTES NFR BLD AUTO: 10.8 %
MONOCYTES NFR BLD AUTO: 10.9 %
MONOCYTES NFR BLD AUTO: 11 %
MONOCYTES NFR BLD AUTO: 11.3 %
MONOCYTES NFR BLD AUTO: 11.3 %
MONOCYTES NFR BLD AUTO: 11.8 %
MONOCYTES NFR BLD AUTO: 12.4 %
MONOCYTES NFR BLD AUTO: 12.9 %
MONOCYTES NFR BLD AUTO: 14.5 %
MONOCYTES NFR BLD AUTO: 8.1 %
MONOCYTES NFR BLD AUTO: 9.1 %
MONOCYTES NFR BLD AUTO: 9.4 %
NEUTROPHILS # BLD AUTO: 5.32 X10 (3) UL (ref 1.5–7.7)
NEUTROPHILS # BLD AUTO: 5.32 X10(3) UL (ref 1.5–7.7)
NEUTROPHILS # BLD AUTO: 5.37 X10 (3) UL (ref 1.5–7.7)
NEUTROPHILS # BLD AUTO: 5.37 X10(3) UL (ref 1.5–7.7)
NEUTROPHILS # BLD AUTO: 5.54 X10 (3) UL (ref 1.5–7.7)
NEUTROPHILS # BLD AUTO: 5.54 X10(3) UL (ref 1.5–7.7)
NEUTROPHILS # BLD AUTO: 5.69 X10 (3) UL (ref 1.5–7.7)
NEUTROPHILS # BLD AUTO: 5.69 X10(3) UL (ref 1.5–7.7)
NEUTROPHILS # BLD AUTO: 6.05 X10 (3) UL (ref 1.5–7.7)
NEUTROPHILS # BLD AUTO: 6.05 X10(3) UL (ref 1.5–7.7)
NEUTROPHILS # BLD AUTO: 6.15 X10 (3) UL (ref 1.5–7.7)
NEUTROPHILS # BLD AUTO: 6.15 X10(3) UL (ref 1.5–7.7)
NEUTROPHILS # BLD AUTO: 6.37 X10 (3) UL (ref 1.5–7.7)
NEUTROPHILS # BLD AUTO: 6.37 X10 (3) UL (ref 1.5–7.7)
NEUTROPHILS # BLD AUTO: 6.37 X10(3) UL (ref 1.5–7.7)
NEUTROPHILS # BLD AUTO: 6.37 X10(3) UL (ref 1.5–7.7)
NEUTROPHILS # BLD AUTO: 6.82 X10 (3) UL (ref 1.5–7.7)
NEUTROPHILS # BLD AUTO: 6.82 X10(3) UL (ref 1.5–7.7)
NEUTROPHILS # BLD AUTO: 7.11 X10 (3) UL (ref 1.5–7.7)
NEUTROPHILS # BLD AUTO: 7.11 X10(3) UL (ref 1.5–7.7)
NEUTROPHILS # BLD AUTO: 8.68 X10 (3) UL (ref 1.5–7.7)
NEUTROPHILS # BLD AUTO: 8.68 X10(3) UL (ref 1.5–7.7)
NEUTROPHILS # BLD AUTO: 8.77 X10 (3) UL (ref 1.5–7.7)
NEUTROPHILS # BLD AUTO: 8.77 X10(3) UL (ref 1.5–7.7)
NEUTROPHILS NFR BLD AUTO: 45.1 %
NEUTROPHILS NFR BLD AUTO: 48.5 %
NEUTROPHILS NFR BLD AUTO: 49 %
NEUTROPHILS NFR BLD AUTO: 50.5 %
NEUTROPHILS NFR BLD AUTO: 51.1 %
NEUTROPHILS NFR BLD AUTO: 52.5 %
NEUTROPHILS NFR BLD AUTO: 53.3 %
NEUTROPHILS NFR BLD AUTO: 53.9 %
NEUTROPHILS NFR BLD AUTO: 56.7 %
NEUTROPHILS NFR BLD AUTO: 59.5 %
NEUTROPHILS NFR BLD AUTO: 64.1 %
NEUTROPHILS NFR BLD AUTO: 70.1 %
NITRITE UR QL STRIP.AUTO: NEGATIVE
NONHDLC SERPL-MCNC: 116 MG/DL (ref ?–130)
OSMOLALITY SERPL CALC.SUM OF ELEC: 278 MOSM/KG (ref 275–295)
OSMOLALITY SERPL CALC.SUM OF ELEC: 305 MOSM/KG (ref 275–295)
OSMOLALITY SERPL CALC.SUM OF ELEC: 308 MOSM/KG (ref 275–295)
OSMOLALITY SERPL CALC.SUM OF ELEC: 310 MOSM/KG (ref 275–295)
OSMOLALITY SERPL CALC.SUM OF ELEC: 310 MOSM/KG (ref 275–295)
OSMOLALITY SERPL CALC.SUM OF ELEC: 314 MOSM/KG (ref 275–295)
OSMOLALITY SERPL CALC.SUM OF ELEC: 314 MOSM/KG (ref 275–295)
OSMOLALITY SERPL CALC.SUM OF ELEC: 315 MOSM/KG (ref 275–295)
OSMOLALITY SERPL CALC.SUM OF ELEC: 317 MOSM/KG (ref 275–295)
OSMOLALITY SERPL CALC.SUM OF ELEC: 318 MOSM/KG (ref 275–295)
OSMOLALITY SERPL CALC.SUM OF ELEC: 319 MOSM/KG (ref 275–295)
OSMOLALITY SERPL CALC.SUM OF ELEC: 321 MOSM/KG (ref 275–295)
OSMOLALITY SERPL CALC.SUM OF ELEC: 321 MOSM/KG (ref 275–295)
OSMOLALITY SERPL CALC.SUM OF ELEC: 325 MOSM/KG (ref 275–295)
OSMOLALITY SERPL CALC.SUM OF ELEC: 330 MOSM/KG (ref 275–295)
PH UR STRIP.AUTO: 5 [PH] (ref 4.5–8)
PH UR STRIP.AUTO: 6 [PH] (ref 4.5–8)
PLATELET # BLD AUTO: 219 10(3)UL (ref 150–450)
PLATELET # BLD AUTO: 224 10(3)UL (ref 150–450)
PLATELET # BLD AUTO: 235 10(3)UL (ref 150–450)
PLATELET # BLD AUTO: 239 10(3)UL (ref 150–450)
PLATELET # BLD AUTO: 253 10(3)UL (ref 150–450)
PLATELET # BLD AUTO: 265 10(3)UL (ref 150–450)
PLATELET # BLD AUTO: 270 10(3)UL (ref 150–450)
PLATELET # BLD AUTO: 275 10(3)UL (ref 150–450)
PLATELET # BLD AUTO: 296 10(3)UL (ref 150–450)
PLATELET # BLD AUTO: 348 10(3)UL (ref 150–450)
PLATELET # BLD AUTO: 358 10(3)UL (ref 150–450)
PLATELET # BLD AUTO: 412 10(3)UL (ref 150–450)
PLATELET # BLD AUTO: 418 10(3)UL (ref 150–450)
POTASSIUM SERPL-SCNC: 3.8 MMOL/L (ref 3.5–5.1)
POTASSIUM SERPL-SCNC: 3.9 MMOL/L (ref 3.5–5.1)
POTASSIUM SERPL-SCNC: 4.1 MMOL/L (ref 3.5–5.1)
POTASSIUM SERPL-SCNC: 4.2 MMOL/L (ref 3.5–5.1)
POTASSIUM SERPL-SCNC: 4.2 MMOL/L (ref 3.5–5.1)
POTASSIUM SERPL-SCNC: 4.3 MMOL/L (ref 3.5–5.1)
POTASSIUM SERPL-SCNC: 4.4 MMOL/L (ref 3.5–5.1)
POTASSIUM SERPL-SCNC: 4.7 MMOL/L (ref 3.5–5.1)
PREALB SERPL-MCNC: 12.1 MG/DL (ref 20–40)
PROT UR STRIP.AUTO-MCNC: 30 MG/DL
PROT UR STRIP.AUTO-MCNC: NEGATIVE MG/DL
RBC # BLD AUTO: 3.26 X10(6)UL (ref 3.8–5.3)
RBC # BLD AUTO: 3.45 X10(6)UL (ref 3.8–5.3)
RBC # BLD AUTO: 3.48 X10(6)UL (ref 3.8–5.3)
RBC # BLD AUTO: 3.5 X10(6)UL (ref 3.8–5.3)
RBC # BLD AUTO: 3.54 X10(6)UL (ref 3.8–5.3)
RBC # BLD AUTO: 3.65 X10(6)UL (ref 3.8–5.3)
RBC # BLD AUTO: 3.67 X10(6)UL (ref 3.8–5.3)
RBC # BLD AUTO: 3.67 X10(6)UL (ref 3.8–5.3)
RBC # BLD AUTO: 3.77 X10(6)UL (ref 3.8–5.3)
RBC # BLD AUTO: 3.8 X10(6)UL (ref 3.8–5.3)
RBC # BLD AUTO: 3.87 X10(6)UL (ref 3.8–5.3)
RBC # BLD AUTO: 3.97 X10(6)UL (ref 3.8–5.3)
RBC # BLD AUTO: 4.21 X10(6)UL (ref 3.8–5.3)
RBC UR QL AUTO: NEGATIVE
SED RATE-ML: 53 MM/HR (ref 0–25)
SED RATE-ML: 59 MM/HR (ref 0–25)
SED RATE-ML: 67 MM/HR (ref 0–25)
SODIUM SERPL-SCNC: 129 MMOL/L (ref 136–145)
SODIUM SERPL-SCNC: 136 MMOL/L (ref 136–145)
SODIUM SERPL-SCNC: 136 MMOL/L (ref 136–145)
SODIUM SERPL-SCNC: 138 MMOL/L (ref 136–145)
SODIUM SERPL-SCNC: 138 MMOL/L (ref 136–145)
SODIUM SERPL-SCNC: 139 MMOL/L (ref 136–145)
SODIUM SERPL-SCNC: 140 MMOL/L (ref 136–145)
SODIUM SERPL-SCNC: 143 MMOL/L (ref 136–145)
SODIUM SERPL-SCNC: 146 MMOL/L (ref 136–145)
SP GR UR STRIP.AUTO: 1.01 (ref 1–1.03)
SP GR UR STRIP.AUTO: 1.02 (ref 1–1.03)
TRIGL SERPL-MCNC: 104 MG/DL (ref 30–149)
TSI SER-ACNC: 9.64 MIU/ML (ref 0.36–3.74)
URATE SERPL-MCNC: 5.5 MG/DL (ref 2.6–6)
URATE SERPL-MCNC: 6 MG/DL (ref 2.6–6)
URATE SERPL-MCNC: 6 MG/DL (ref 2.6–6)
URATE SERPL-MCNC: 6.7 MG/DL (ref 2.6–6)
UROBILINOGEN UR STRIP.AUTO-MCNC: <2 MG/DL
VLDLC SERPL CALC-MCNC: 21 MG/DL (ref 0–30)
WBC # BLD AUTO: 10.8 X10(3) UL (ref 4–11)
WBC # BLD AUTO: 10.8 X10(3) UL (ref 4–11)
WBC # BLD AUTO: 11.1 X10(3) UL (ref 4–11)
WBC # BLD AUTO: 11.5 X10(3) UL (ref 4–11)
WBC # BLD AUTO: 11.8 X10(3) UL (ref 4–11)
WBC # BLD AUTO: 11.8 X10(3) UL (ref 4–11)
WBC # BLD AUTO: 11.9 X10(3) UL (ref 4–11)
WBC # BLD AUTO: 12 X10(3) UL (ref 4–11)
WBC # BLD AUTO: 12.5 X10(3) UL (ref 4–11)
WBC # BLD AUTO: 12.5 X10(3) UL (ref 4–11)
WBC # BLD AUTO: 12.6 X10(3) UL (ref 4–11)
WBC # BLD AUTO: 13.5 X10(3) UL (ref 4–11)
WBC # BLD AUTO: 14.1 X10(3) UL (ref 4–11)

## 2019-01-01 PROCEDURE — 87186 SC STD MICRODIL/AGAR DIL: CPT

## 2019-01-01 PROCEDURE — 11042 DBRDMT SUBQ TIS 1ST 20SQCM/<: CPT

## 2019-01-01 PROCEDURE — 99232 SBSQ HOSP IP/OBS MODERATE 35: CPT | Performed by: HOSPITALIST

## 2019-01-01 PROCEDURE — 99309 SBSQ NF CARE MODERATE MDM 30: CPT | Performed by: NURSE PRACTITIONER

## 2019-01-01 PROCEDURE — 36415 COLL VENOUS BLD VENIPUNCTURE: CPT

## 2019-01-01 PROCEDURE — 84134 ASSAY OF PREALBUMIN: CPT

## 2019-01-01 PROCEDURE — 84443 ASSAY THYROID STIM HORMONE: CPT

## 2019-01-01 PROCEDURE — 80053 COMPREHEN METABOLIC PANEL: CPT

## 2019-01-01 PROCEDURE — 85025 COMPLETE CBC W/AUTO DIFF WBC: CPT | Performed by: INTERNAL MEDICINE

## 2019-01-01 PROCEDURE — 99308 SBSQ NF CARE LOW MDM 20: CPT | Performed by: NURSE PRACTITIONER

## 2019-01-01 PROCEDURE — 99233 SBSQ HOSP IP/OBS HIGH 50: CPT | Performed by: HOSPITALIST

## 2019-01-01 PROCEDURE — 99223 1ST HOSP IP/OBS HIGH 75: CPT | Performed by: HOSPITALIST

## 2019-01-01 PROCEDURE — 80048 BASIC METABOLIC PNL TOTAL CA: CPT | Performed by: INTERNAL MEDICINE

## 2019-01-01 PROCEDURE — 99214 OFFICE O/P EST MOD 30 MIN: CPT

## 2019-01-01 PROCEDURE — 05H633Z INSERTION OF INFUSION DEVICE INTO LEFT SUBCLAVIAN VEIN, PERCUTANEOUS APPROACH: ICD-10-PCS | Performed by: HOSPITALIST

## 2019-01-01 PROCEDURE — 83036 HEMOGLOBIN GLYCOSYLATED A1C: CPT

## 2019-01-01 PROCEDURE — 80048 BASIC METABOLIC PNL TOTAL CA: CPT

## 2019-01-01 PROCEDURE — 99215 OFFICE O/P EST HI 40 MIN: CPT

## 2019-01-01 PROCEDURE — 84550 ASSAY OF BLOOD/URIC ACID: CPT

## 2019-01-01 PROCEDURE — 85025 COMPLETE CBC W/AUTO DIFF WBC: CPT

## 2019-01-01 PROCEDURE — 87077 CULTURE AEROBIC IDENTIFY: CPT

## 2019-01-01 PROCEDURE — 86140 C-REACTIVE PROTEIN: CPT

## 2019-01-01 PROCEDURE — 74230 X-RAY XM SWLNG FUNCJ C+: CPT | Performed by: HOSPITALIST

## 2019-01-01 PROCEDURE — 73610 X-RAY EXAM OF ANKLE: CPT | Performed by: HOSPITALIST

## 2019-01-01 PROCEDURE — 99231 SBSQ HOSP IP/OBS SF/LOW 25: CPT | Performed by: HOSPITALIST

## 2019-01-01 PROCEDURE — 80053 COMPREHEN METABOLIC PANEL: CPT | Performed by: EMERGENCY MEDICINE

## 2019-01-01 PROCEDURE — 97598 DBRDMT OPN WND ADDL 20CM/<: CPT

## 2019-01-01 PROCEDURE — 87186 SC STD MICRODIL/AGAR DIL: CPT | Performed by: INTERNAL MEDICINE

## 2019-01-01 PROCEDURE — 73610 X-RAY EXAM OF ANKLE: CPT | Performed by: EMERGENCY MEDICINE

## 2019-01-01 PROCEDURE — 0LBV0ZZ EXCISION OF RIGHT FOOT TENDON, OPEN APPROACH: ICD-10-PCS | Performed by: INTERNAL MEDICINE

## 2019-01-01 PROCEDURE — 87086 URINE CULTURE/COLONY COUNT: CPT

## 2019-01-01 PROCEDURE — 99306 1ST NF CARE HIGH MDM 50: CPT | Performed by: FAMILY MEDICINE

## 2019-01-01 PROCEDURE — 84439 ASSAY OF FREE THYROXINE: CPT

## 2019-01-01 PROCEDURE — 99232 SBSQ HOSP IP/OBS MODERATE 35: CPT | Performed by: INTERNAL MEDICINE

## 2019-01-01 PROCEDURE — 82962 GLUCOSE BLOOD TEST: CPT

## 2019-01-01 PROCEDURE — 71045 X-RAY EXAM CHEST 1 VIEW: CPT | Performed by: HOSPITALIST

## 2019-01-01 PROCEDURE — 99309 SBSQ NF CARE MODERATE MDM 30: CPT | Performed by: FAMILY MEDICINE

## 2019-01-01 PROCEDURE — 83735 ASSAY OF MAGNESIUM: CPT

## 2019-01-01 PROCEDURE — 84550 ASSAY OF BLOOD/URIC ACID: CPT | Performed by: INTERNAL MEDICINE

## 2019-01-01 PROCEDURE — 99239 HOSP IP/OBS DSCHRG MGMT >30: CPT | Performed by: HOSPITALIST

## 2019-01-01 PROCEDURE — 99214 OFFICE O/P EST MOD 30 MIN: CPT | Performed by: NURSE PRACTITIONER

## 2019-01-01 PROCEDURE — 99285 EMERGENCY DEPT VISIT HI MDM: CPT

## 2019-01-01 PROCEDURE — 81001 URINALYSIS AUTO W/SCOPE: CPT | Performed by: INTERNAL MEDICINE

## 2019-01-01 PROCEDURE — 85610 PROTHROMBIN TIME: CPT

## 2019-01-01 PROCEDURE — 71046 X-RAY EXAM CHEST 2 VIEWS: CPT | Performed by: NURSE PRACTITIONER

## 2019-01-01 PROCEDURE — 82570 ASSAY OF URINE CREATININE: CPT

## 2019-01-01 PROCEDURE — 73630 X-RAY EXAM OF FOOT: CPT | Performed by: NURSE PRACTITIONER

## 2019-01-01 PROCEDURE — 87040 BLOOD CULTURE FOR BACTERIA: CPT | Performed by: EMERGENCY MEDICINE

## 2019-01-01 PROCEDURE — 11045 DBRDMT SUBQ TISS EACH ADDL: CPT

## 2019-01-01 PROCEDURE — 71045 X-RAY EXAM CHEST 1 VIEW: CPT | Performed by: EMERGENCY MEDICINE

## 2019-01-01 PROCEDURE — 96365 THER/PROPH/DIAG IV INF INIT: CPT

## 2019-01-01 PROCEDURE — 80061 LIPID PANEL: CPT

## 2019-01-01 PROCEDURE — 82306 VITAMIN D 25 HYDROXY: CPT

## 2019-01-01 PROCEDURE — 93306 TTE W/DOPPLER COMPLETE: CPT | Performed by: HOSPITALIST

## 2019-01-01 PROCEDURE — 1111F DSCHRG MED/CURRENT MED MERGE: CPT | Performed by: NURSE PRACTITIONER

## 2019-01-01 PROCEDURE — B547ZZA ULTRASONOGRAPHY OF LEFT SUBCLAVIAN VEIN, GUIDANCE: ICD-10-PCS | Performed by: HOSPITALIST

## 2019-01-01 PROCEDURE — 0LBS0ZZ EXCISION OF RIGHT ANKLE TENDON, OPEN APPROACH: ICD-10-PCS | Performed by: ORTHOPAEDIC SURGERY

## 2019-01-01 PROCEDURE — 99233 SBSQ HOSP IP/OBS HIGH 50: CPT | Performed by: INTERNAL MEDICINE

## 2019-01-01 PROCEDURE — 85652 RBC SED RATE AUTOMATED: CPT

## 2019-01-01 PROCEDURE — 82043 UR ALBUMIN QUANTITATIVE: CPT

## 2019-01-01 PROCEDURE — 73600 X-RAY EXAM OF ANKLE: CPT | Performed by: HOSPITALIST

## 2019-01-01 PROCEDURE — 81001 URINALYSIS AUTO W/SCOPE: CPT

## 2019-01-01 PROCEDURE — 87077 CULTURE AEROBIC IDENTIFY: CPT | Performed by: INTERNAL MEDICINE

## 2019-01-01 PROCEDURE — 1111F DSCHRG MED/CURRENT MED MERGE: CPT

## 2019-01-01 PROCEDURE — 85025 COMPLETE CBC W/AUTO DIFF WBC: CPT | Performed by: EMERGENCY MEDICINE

## 2019-01-01 PROCEDURE — 99222 1ST HOSP IP/OBS MODERATE 55: CPT | Performed by: INTERNAL MEDICINE

## 2019-01-01 PROCEDURE — 87086 URINE CULTURE/COLONY COUNT: CPT | Performed by: INTERNAL MEDICINE

## 2019-01-01 PROCEDURE — 0QBL0ZZ EXCISION OF RIGHT TARSAL, OPEN APPROACH: ICD-10-PCS | Performed by: ORTHOPAEDIC SURGERY

## 2019-01-01 PROCEDURE — 73610 X-RAY EXAM OF ANKLE: CPT | Performed by: NURSE PRACTITIONER

## 2019-01-01 PROCEDURE — 99316 NF DSCHRG MGMT 30 MIN+: CPT | Performed by: NURSE PRACTITIONER

## 2019-01-01 PROCEDURE — 81003 URINALYSIS AUTO W/O SCOPE: CPT | Performed by: EMERGENCY MEDICINE

## 2019-01-01 PROCEDURE — 83880 ASSAY OF NATRIURETIC PEPTIDE: CPT

## 2019-01-01 PROCEDURE — 99223 1ST HOSP IP/OBS HIGH 75: CPT | Performed by: INTERNAL MEDICINE

## 2019-01-01 PROCEDURE — 97597 DBRDMT OPN WND 1ST 20 CM/<: CPT

## 2019-01-01 PROCEDURE — 85540 WBC ALKALINE PHOSPHATASE: CPT | Performed by: INTERNAL MEDICINE

## 2019-01-01 RX ORDER — CEFAZOLIN SODIUM/WATER 2 G/20 ML
SYRINGE (ML) INTRAVENOUS
Status: DISCONTINUED | OUTPATIENT
Start: 2019-01-01 | End: 2019-01-01 | Stop reason: HOSPADM

## 2019-01-01 RX ORDER — HYDROCODONE BITARTRATE AND ACETAMINOPHEN 5; 325 MG/1; MG/1
1 TABLET ORAL ONCE
Status: COMPLETED | OUTPATIENT
Start: 2019-01-01 | End: 2019-01-01

## 2019-01-01 RX ORDER — MORPHINE SULFATE 4 MG/ML
2 INJECTION, SOLUTION INTRAMUSCULAR; INTRAVENOUS EVERY 2 HOUR PRN
Status: DISCONTINUED | OUTPATIENT
Start: 2019-01-01 | End: 2019-01-01

## 2019-01-01 RX ORDER — TORSEMIDE 5 MG/1
10 TABLET ORAL DAILY
Status: DISCONTINUED | OUTPATIENT
Start: 2019-01-01 | End: 2019-01-01

## 2019-01-01 RX ORDER — SODIUM CHLORIDE, SODIUM LACTATE, POTASSIUM CHLORIDE, CALCIUM CHLORIDE 600; 310; 30; 20 MG/100ML; MG/100ML; MG/100ML; MG/100ML
INJECTION, SOLUTION INTRAVENOUS CONTINUOUS
Status: DISCONTINUED | OUTPATIENT
Start: 2019-01-01 | End: 2019-01-01 | Stop reason: HOSPADM

## 2019-01-01 RX ORDER — ACETAMINOPHEN 325 MG/1
650 TABLET ORAL EVERY 4 HOURS PRN
Status: DISCONTINUED | OUTPATIENT
Start: 2019-01-01 | End: 2019-01-01

## 2019-01-01 RX ORDER — HEPARIN SODIUM 5000 [USP'U]/ML
5000 INJECTION, SOLUTION INTRAVENOUS; SUBCUTANEOUS EVERY 8 HOURS SCHEDULED
Status: DISCONTINUED | OUTPATIENT
Start: 2019-01-01 | End: 2019-01-01

## 2019-01-01 RX ORDER — METOPROLOL SUCCINATE 100 MG/1
100 TABLET, EXTENDED RELEASE ORAL DAILY
Status: DISCONTINUED | OUTPATIENT
Start: 2019-01-01 | End: 2019-01-01

## 2019-01-01 RX ORDER — ASPIRIN 81 MG/1
81 TABLET ORAL DAILY
Status: DISCONTINUED | OUTPATIENT
Start: 2019-01-01 | End: 2019-01-01

## 2019-01-01 RX ORDER — HYDROCODONE BITARTRATE AND ACETAMINOPHEN 5; 325 MG/1; MG/1
2 TABLET ORAL EVERY 4 HOURS PRN
Status: DISCONTINUED | OUTPATIENT
Start: 2019-01-01 | End: 2019-01-01

## 2019-01-01 RX ORDER — NORTRIPTYLINE HYDROCHLORIDE 25 MG/1
CAPSULE ORAL
Qty: 90 CAPSULE | Refills: 1 | Status: SHIPPED | OUTPATIENT
Start: 2019-01-01 | End: 2019-01-01

## 2019-01-01 RX ORDER — FENTANYL 25 UG/H
1 PATCH TRANSDERMAL
Qty: 10 PATCH | Refills: 0 | Status: SHIPPED | OUTPATIENT
Start: 2019-01-01 | End: 2019-08-29

## 2019-01-01 RX ORDER — ENOXAPARIN SODIUM 100 MG/ML
30 INJECTION SUBCUTANEOUS NIGHTLY
Status: DISCONTINUED | OUTPATIENT
Start: 2019-01-01 | End: 2019-01-01

## 2019-01-01 RX ORDER — NALOXONE HYDROCHLORIDE 0.4 MG/ML
80 INJECTION, SOLUTION INTRAMUSCULAR; INTRAVENOUS; SUBCUTANEOUS AS NEEDED
Status: DISCONTINUED | OUTPATIENT
Start: 2019-01-01 | End: 2019-01-01 | Stop reason: HOSPADM

## 2019-01-01 RX ORDER — ALLOPURINOL 100 MG/1
200 TABLET ORAL DAILY
COMMUNITY
End: 2019-01-01

## 2019-01-01 RX ORDER — PANTOPRAZOLE SODIUM 40 MG/1
40 TABLET, DELAYED RELEASE ORAL
Qty: 40 TABLET | Refills: 0 | Status: SHIPPED | OUTPATIENT
Start: 2019-01-01 | End: 2019-01-01

## 2019-01-01 RX ORDER — HYDROMORPHONE HYDROCHLORIDE 1 MG/ML
INJECTION, SOLUTION INTRAMUSCULAR; INTRAVENOUS; SUBCUTANEOUS
Status: COMPLETED
Start: 2019-01-01 | End: 2019-01-01

## 2019-01-01 RX ORDER — HYDROCODONE BITARTRATE AND ACETAMINOPHEN 10; 325 MG/1; MG/1
1-2 TABLET ORAL EVERY 4 HOURS PRN
Qty: 20 TABLET | Refills: 0 | Status: SHIPPED | OUTPATIENT
Start: 2019-01-01 | End: 2019-01-01

## 2019-01-01 RX ORDER — FENTANYL 25 UG/H
1 PATCH TRANSDERMAL
Qty: 10 PATCH | Refills: 0 | Status: SHIPPED | OUTPATIENT
Start: 2019-01-01 | End: 2019-01-01

## 2019-01-01 RX ORDER — GLIMEPIRIDE 4 MG/1
4 TABLET ORAL
COMMUNITY
End: 2019-01-01

## 2019-01-01 RX ORDER — SODIUM CHLORIDE 9 MG/ML
INJECTION, SOLUTION INTRAVENOUS CONTINUOUS
Status: CANCELLED | OUTPATIENT
Start: 2019-01-01 | End: 2019-01-01

## 2019-01-01 RX ORDER — MONTELUKAST SODIUM 10 MG/1
10 TABLET ORAL NIGHTLY
COMMUNITY

## 2019-01-01 RX ORDER — POLYETHYLENE GLYCOL 3350 17 G/17G
17 POWDER, FOR SOLUTION ORAL DAILY PRN
Status: DISCONTINUED | OUTPATIENT
Start: 2019-01-01 | End: 2019-01-01

## 2019-01-01 RX ORDER — CEFAZOLIN SODIUM/WATER 2 G/20 ML
2 SYRINGE (ML) INTRAVENOUS ONCE
Status: DISCONTINUED | OUTPATIENT
Start: 2019-01-01 | End: 2019-01-01

## 2019-01-01 RX ORDER — HYDROCODONE BITARTRATE AND ACETAMINOPHEN 10; 325 MG/1; MG/1
1 TABLET ORAL EVERY 6 HOURS PRN
Qty: 120 TABLET | Refills: 0 | Status: ON HOLD | OUTPATIENT
Start: 2019-01-01 | End: 2019-01-01

## 2019-01-01 RX ORDER — DOCUSATE SODIUM 100 MG/1
100 CAPSULE, LIQUID FILLED ORAL 2 TIMES DAILY
COMMUNITY
End: 2019-01-01 | Stop reason: CLARIF

## 2019-01-01 RX ORDER — CIPROFLOXACIN 500 MG/1
500 TABLET, FILM COATED ORAL DAILY
COMMUNITY
Start: 2019-01-01 | End: 2019-01-01

## 2019-01-01 RX ORDER — MULTIVITAMIN WITH FOLIC ACID 400 MCG
1 TABLET ORAL DAILY
COMMUNITY

## 2019-01-01 RX ORDER — TORSEMIDE 10 MG/1
10 TABLET ORAL DAILY
Refills: 0 | Status: SHIPPED | COMMUNITY
Start: 2019-01-01 | End: 2019-01-01

## 2019-01-01 RX ORDER — DEXTROSE MONOHYDRATE 25 G/50ML
50 INJECTION, SOLUTION INTRAVENOUS
Status: DISCONTINUED | OUTPATIENT
Start: 2019-01-01 | End: 2019-01-01

## 2019-01-01 RX ORDER — DOCUSATE SODIUM 100 MG/1
100 CAPSULE, LIQUID FILLED ORAL DAILY
COMMUNITY
End: 2019-01-01

## 2019-01-01 RX ORDER — CEFAZOLIN SODIUM/WATER 2 G/20 ML
2 SYRINGE (ML) INTRAVENOUS EVERY 8 HOURS
Status: COMPLETED | OUTPATIENT
Start: 2019-01-01 | End: 2019-01-01

## 2019-01-01 RX ORDER — TORSEMIDE 20 MG/1
20 TABLET ORAL
Status: DISCONTINUED | OUTPATIENT
Start: 2019-01-01 | End: 2019-01-01

## 2019-01-01 RX ORDER — NITROGLYCERIN 40 MG/1
1 PATCH TRANSDERMAL DAILY PRN
COMMUNITY

## 2019-01-01 RX ORDER — POTASSIUM CHLORIDE 1.5 G/1.77G
40 POWDER, FOR SOLUTION ORAL EVERY 4 HOURS
Status: COMPLETED | OUTPATIENT
Start: 2019-01-01 | End: 2019-01-01

## 2019-01-01 RX ORDER — HYDROMORPHONE HYDROCHLORIDE 1 MG/ML
0.5 INJECTION, SOLUTION INTRAMUSCULAR; INTRAVENOUS; SUBCUTANEOUS ONCE
Status: COMPLETED | OUTPATIENT
Start: 2019-01-01 | End: 2019-01-01

## 2019-01-01 RX ORDER — MONTELUKAST SODIUM 10 MG/1
10 TABLET ORAL NIGHTLY
Status: DISCONTINUED | OUTPATIENT
Start: 2019-01-01 | End: 2019-01-01

## 2019-01-01 RX ORDER — ONDANSETRON 2 MG/ML
4 INJECTION INTRAMUSCULAR; INTRAVENOUS EVERY 6 HOURS PRN
Status: DISCONTINUED | OUTPATIENT
Start: 2019-01-01 | End: 2019-01-01

## 2019-01-01 RX ORDER — TORSEMIDE 20 MG/1
20 TABLET ORAL 2 TIMES DAILY
Qty: 60 TABLET | Refills: 0 | Status: SHIPPED | OUTPATIENT
Start: 2019-01-01 | End: 2019-01-01

## 2019-01-01 RX ORDER — LISINOPRIL 10 MG/1
10 TABLET ORAL DAILY
Status: DISCONTINUED | OUTPATIENT
Start: 2019-01-01 | End: 2019-01-01

## 2019-01-01 RX ORDER — LISINOPRIL 5 MG/1
5 TABLET ORAL DAILY
Status: DISCONTINUED | OUTPATIENT
Start: 2019-01-01 | End: 2019-01-01

## 2019-01-01 RX ORDER — ACETAMINOPHEN 500 MG
1000 TABLET ORAL ONCE
Status: COMPLETED | OUTPATIENT
Start: 2019-01-01 | End: 2019-01-01

## 2019-01-01 RX ORDER — GABAPENTIN 100 MG/1
200 CAPSULE ORAL NIGHTLY
COMMUNITY
End: 2019-01-01

## 2019-01-01 RX ORDER — ACETAMINOPHEN 325 MG/1
650 TABLET ORAL EVERY 6 HOURS PRN
Status: DISCONTINUED | OUTPATIENT
Start: 2019-01-01 | End: 2019-01-01

## 2019-01-01 RX ORDER — ACETAMINOPHEN 325 MG/1
650 TABLET ORAL EVERY 6 HOURS PRN
Qty: 30 TABLET | Refills: 0 | Status: ON HOLD | OUTPATIENT
Start: 2019-01-01 | End: 2019-01-01

## 2019-01-01 RX ORDER — TRAMADOL HYDROCHLORIDE 50 MG/1
TABLET ORAL EVERY 12 HOURS PRN
Qty: 30 TABLET | Refills: 0 | Status: ON HOLD | OUTPATIENT
Start: 2019-01-01 | End: 2019-01-01

## 2019-01-01 RX ORDER — FLUTICASONE PROPIONATE 50 MCG
1 SPRAY, SUSPENSION (ML) NASAL DAILY
Status: DISCONTINUED | OUTPATIENT
Start: 2019-01-01 | End: 2019-01-01

## 2019-01-01 RX ORDER — LEVOTHYROXINE SODIUM 0.1 MG/1
100 TABLET ORAL
Qty: 30 TABLET | Refills: 0 | Status: SHIPPED | OUTPATIENT
Start: 2019-01-01 | End: 2019-01-01

## 2019-01-01 RX ORDER — HYDROCODONE BITARTRATE AND ACETAMINOPHEN 5; 325 MG/1; MG/1
1 TABLET ORAL EVERY 4 HOURS PRN
Qty: 20 TABLET | Refills: 0 | Status: SHIPPED | OUTPATIENT
Start: 2019-01-01 | End: 2019-01-01

## 2019-01-01 RX ORDER — HYDROCODONE BITARTRATE AND ACETAMINOPHEN 10; 325 MG/1; MG/1
1-2 TABLET ORAL EVERY 6 HOURS PRN
Status: DISCONTINUED | OUTPATIENT
Start: 2019-01-01 | End: 2019-01-01

## 2019-01-01 RX ORDER — HYDROMORPHONE HYDROCHLORIDE 1 MG/ML
0.4 INJECTION, SOLUTION INTRAMUSCULAR; INTRAVENOUS; SUBCUTANEOUS EVERY 5 MIN PRN
Status: DISCONTINUED | OUTPATIENT
Start: 2019-01-01 | End: 2019-01-01 | Stop reason: HOSPADM

## 2019-01-01 RX ORDER — FENTANYL 25 UG/H
1 PATCH TRANSDERMAL
Qty: 10 PATCH | Refills: 0 | Status: ON HOLD | OUTPATIENT
Start: 2019-01-01 | End: 2019-01-01

## 2019-01-01 RX ORDER — MEPERIDINE HYDROCHLORIDE 25 MG/ML
12.5 INJECTION INTRAMUSCULAR; INTRAVENOUS; SUBCUTANEOUS AS NEEDED
Status: DISCONTINUED | OUTPATIENT
Start: 2019-01-01 | End: 2019-01-01 | Stop reason: HOSPADM

## 2019-01-01 RX ORDER — HYDROCODONE BITARTRATE AND ACETAMINOPHEN 10; 325 MG/1; MG/1
1 TABLET ORAL EVERY 6 HOURS PRN
Qty: 20 TABLET | Refills: 0 | Status: SHIPPED | OUTPATIENT
Start: 2019-01-01

## 2019-01-01 RX ORDER — MORPHINE SULFATE 4 MG/ML
4 INJECTION, SOLUTION INTRAMUSCULAR; INTRAVENOUS EVERY 2 HOUR PRN
Status: DISCONTINUED | OUTPATIENT
Start: 2019-01-01 | End: 2019-01-01

## 2019-01-01 RX ORDER — AMLODIPINE BESYLATE 5 MG/1
5 TABLET ORAL DAILY
Qty: 90 TABLET | Refills: 1 | Status: ON HOLD | OUTPATIENT
Start: 2019-01-01 | End: 2019-01-01

## 2019-01-01 RX ORDER — GABAPENTIN 100 MG/1
100 CAPSULE ORAL NIGHTLY
Status: DISCONTINUED | OUTPATIENT
Start: 2019-01-01 | End: 2019-01-01

## 2019-01-01 RX ORDER — CLOPIDOGREL BISULFATE 75 MG/1
75 TABLET ORAL
Status: DISCONTINUED | OUTPATIENT
Start: 2019-01-01 | End: 2019-01-01

## 2019-01-01 RX ORDER — LEVOTHYROXINE SODIUM 0.12 MG/1
125 TABLET ORAL
Qty: 60 TABLET | Refills: 0 | Status: SHIPPED | OUTPATIENT
Start: 2019-01-01

## 2019-01-01 RX ORDER — NALOXONE HYDROCHLORIDE 4 MG/.1ML
4 SPRAY, METERED NASAL AS NEEDED
Qty: 1 KIT | Refills: 0 | Status: SHIPPED | OUTPATIENT
Start: 2019-01-01

## 2019-01-01 RX ORDER — LEVOTHYROXINE SODIUM 0.12 MG/1
125 TABLET ORAL
Status: DISCONTINUED | OUTPATIENT
Start: 2019-01-01 | End: 2019-01-01

## 2019-01-01 RX ORDER — RIBOFLAVIN (VITAMIN B2) 100 MG
100 TABLET ORAL DAILY
COMMUNITY

## 2019-01-01 RX ORDER — FLUOCINONIDE 0.5 MG/G
OINTMENT TOPICAL
Qty: 30 G | Refills: 0 | Status: SHIPPED | OUTPATIENT
Start: 2019-01-01 | End: 2019-01-01

## 2019-01-01 RX ORDER — GABAPENTIN 100 MG/1
200 CAPSULE ORAL NIGHTLY
Qty: 180 CAPSULE | Refills: 0 | Status: SHIPPED | OUTPATIENT
Start: 2019-01-01

## 2019-01-01 RX ORDER — NYSTATIN 100000 U/G
1 CREAM TOPICAL 2 TIMES DAILY
Qty: 30 G | Refills: 0 | Status: ON HOLD | OUTPATIENT
Start: 2019-01-01 | End: 2019-01-01

## 2019-01-01 RX ORDER — DEXTROSE MONOHYDRATE 25 G/50ML
50 INJECTION, SOLUTION INTRAVENOUS
Status: DISCONTINUED | OUTPATIENT
Start: 2019-01-01 | End: 2019-01-01 | Stop reason: HOSPADM

## 2019-01-01 RX ORDER — LISINOPRIL 10 MG/1
10 TABLET ORAL DAILY
Status: ON HOLD | COMMUNITY
End: 2019-01-01

## 2019-01-01 RX ORDER — SODIUM CHLORIDE 9 MG/ML
INJECTION, SOLUTION INTRAVENOUS CONTINUOUS
Status: ACTIVE | OUTPATIENT
Start: 2019-01-01 | End: 2019-01-01

## 2019-01-01 RX ORDER — SODIUM CHLORIDE 0.9 % (FLUSH) 0.9 %
10 SYRINGE (ML) INJECTION EVERY 12 HOURS
Status: DISCONTINUED | OUTPATIENT
Start: 2019-01-01 | End: 2019-01-01

## 2019-01-01 RX ORDER — SODIUM CHLORIDE 9 MG/ML
INJECTION, SOLUTION INTRAVENOUS CONTINUOUS
Status: DISCONTINUED | OUTPATIENT
Start: 2019-01-01 | End: 2019-01-01 | Stop reason: HOSPADM

## 2019-01-01 RX ORDER — DEXTROSE AND SODIUM CHLORIDE 5; .9 G/100ML; G/100ML
INJECTION, SOLUTION INTRAVENOUS CONTINUOUS
Status: DISCONTINUED | OUTPATIENT
Start: 2019-01-01 | End: 2019-01-01

## 2019-01-01 RX ORDER — TEMAZEPAM 15 MG/1
15 CAPSULE ORAL NIGHTLY PRN
Status: DISCONTINUED | OUTPATIENT
Start: 2019-01-01 | End: 2019-01-01

## 2019-01-01 RX ORDER — METOPROLOL SUCCINATE 100 MG/1
100 TABLET, EXTENDED RELEASE ORAL
Status: DISCONTINUED | OUTPATIENT
Start: 2019-01-01 | End: 2019-01-01

## 2019-01-01 RX ORDER — FENTANYL 25 UG/H
1 PATCH TRANSDERMAL
Status: DISCONTINUED | OUTPATIENT
Start: 2019-01-01 | End: 2019-01-01

## 2019-01-01 RX ORDER — MORPHINE SULFATE 4 MG/ML
1 INJECTION, SOLUTION INTRAMUSCULAR; INTRAVENOUS EVERY 2 HOUR PRN
Status: DISCONTINUED | OUTPATIENT
Start: 2019-01-01 | End: 2019-01-01

## 2019-01-01 RX ORDER — MONTELUKAST SODIUM 10 MG/1
TABLET ORAL
Qty: 90 TABLET | Refills: 1 | Status: SHIPPED | OUTPATIENT
Start: 2019-01-01 | End: 2019-01-01

## 2019-01-01 RX ORDER — AMLODIPINE BESYLATE 2.5 MG/1
5 TABLET ORAL
Qty: 90 TABLET | Refills: 3 | COMMUNITY
Start: 2019-01-01 | End: 2019-01-01 | Stop reason: DRUGHIGH

## 2019-01-01 RX ORDER — HYDROCODONE BITARTRATE AND ACETAMINOPHEN 10; 325 MG/1; MG/1
1 TABLET ORAL EVERY 6 HOURS PRN
Status: DISCONTINUED | OUTPATIENT
Start: 2019-01-01 | End: 2019-01-01

## 2019-01-01 RX ORDER — PANTOPRAZOLE SODIUM 40 MG/1
40 TABLET, DELAYED RELEASE ORAL
Qty: 90 TABLET | Refills: 0 | Status: SHIPPED | OUTPATIENT
Start: 2019-01-01

## 2019-01-01 RX ORDER — MONTELUKAST SODIUM 10 MG/1
10 TABLET ORAL
Qty: 90 TABLET | Refills: 0 | Status: SHIPPED | OUTPATIENT
Start: 2019-01-01 | End: 2019-01-01

## 2019-01-01 RX ORDER — ONDANSETRON 2 MG/ML
4 INJECTION INTRAMUSCULAR; INTRAVENOUS AS NEEDED
Status: DISCONTINUED | OUTPATIENT
Start: 2019-01-01 | End: 2019-01-01 | Stop reason: HOSPADM

## 2019-01-01 RX ORDER — FUROSEMIDE 10 MG/ML
20 INJECTION INTRAMUSCULAR; INTRAVENOUS ONCE
Status: COMPLETED | OUTPATIENT
Start: 2019-01-01 | End: 2019-01-01

## 2019-01-01 RX ORDER — DIPHENHYDRAMINE HYDROCHLORIDE 50 MG/ML
12.5 INJECTION INTRAMUSCULAR; INTRAVENOUS AS NEEDED
Status: DISCONTINUED | OUTPATIENT
Start: 2019-01-01 | End: 2019-01-01 | Stop reason: HOSPADM

## 2019-01-01 RX ORDER — PANTOPRAZOLE SODIUM 40 MG/1
40 TABLET, DELAYED RELEASE ORAL
Status: DISCONTINUED | OUTPATIENT
Start: 2019-01-01 | End: 2019-01-01

## 2019-01-01 RX ORDER — ASPIRIN 81 MG/1
81 TABLET ORAL NIGHTLY
Status: DISCONTINUED | OUTPATIENT
Start: 2019-01-01 | End: 2019-01-01

## 2019-01-01 RX ORDER — MIDAZOLAM HYDROCHLORIDE 1 MG/ML
1 INJECTION INTRAMUSCULAR; INTRAVENOUS EVERY 5 MIN PRN
Status: DISCONTINUED | OUTPATIENT
Start: 2019-01-01 | End: 2019-01-01 | Stop reason: HOSPADM

## 2019-01-01 RX ORDER — IPRATROPIUM BROMIDE AND ALBUTEROL SULFATE 2.5; .5 MG/3ML; MG/3ML
3 SOLUTION RESPIRATORY (INHALATION) ONCE
Status: COMPLETED | OUTPATIENT
Start: 2019-01-01 | End: 2019-01-01

## 2019-01-01 RX ORDER — MIDAZOLAM HYDROCHLORIDE 1 MG/ML
INJECTION INTRAMUSCULAR; INTRAVENOUS
Status: COMPLETED
Start: 2019-01-01 | End: 2019-01-01

## 2019-01-01 RX ORDER — METOCLOPRAMIDE HYDROCHLORIDE 5 MG/ML
5 INJECTION INTRAMUSCULAR; INTRAVENOUS EVERY 8 HOURS PRN
Status: DISCONTINUED | OUTPATIENT
Start: 2019-01-01 | End: 2019-01-01

## 2019-01-01 RX ORDER — ZINC SULFATE 50(220)MG
220 CAPSULE ORAL DAILY
COMMUNITY
Start: 2019-01-01 | End: 2019-01-01

## 2019-01-01 RX ORDER — LISINOPRIL 10 MG/1
10 TABLET ORAL DAILY
Qty: 30 TABLET | Refills: 3 | Status: SHIPPED | OUTPATIENT
Start: 2019-01-01 | End: 2019-01-01

## 2019-01-01 RX ORDER — TORSEMIDE 10 MG/1
10 TABLET ORAL DAILY
Qty: 90 TABLET | Refills: 0 | Status: SHIPPED | OUTPATIENT
Start: 2019-01-01

## 2019-01-01 RX ORDER — HYDROCODONE BITARTRATE AND ACETAMINOPHEN 5; 325 MG/1; MG/1
1 TABLET ORAL EVERY 4 HOURS PRN
Status: DISCONTINUED | OUTPATIENT
Start: 2019-01-01 | End: 2019-01-01

## 2019-01-01 RX ORDER — PREDNISONE 20 MG/1
40 TABLET ORAL DAILY
COMMUNITY
End: 2019-01-01 | Stop reason: ALTCHOICE

## 2019-01-01 RX ORDER — DOCUSATE SODIUM 100 MG/1
100 CAPSULE, LIQUID FILLED ORAL 2 TIMES DAILY
Status: DISCONTINUED | OUTPATIENT
Start: 2019-01-01 | End: 2019-01-01

## 2019-01-01 RX ORDER — ASCORBIC ACID 500 MG
500 TABLET ORAL 2 TIMES DAILY
COMMUNITY
Start: 2019-01-01 | End: 2019-01-01

## 2019-01-01 RX ORDER — COLCHICINE 0.6 MG/1
0.6 TABLET ORAL 2 TIMES DAILY
COMMUNITY
End: 2019-01-01 | Stop reason: CLARIF

## 2019-01-01 RX ORDER — GABAPENTIN 100 MG/1
200 CAPSULE ORAL NIGHTLY
Status: DISCONTINUED | OUTPATIENT
Start: 2019-01-01 | End: 2019-01-01

## 2019-01-01 RX ORDER — SODIUM CHLORIDE 9 MG/ML
INJECTION, SOLUTION INTRAVENOUS ONCE
Status: COMPLETED | OUTPATIENT
Start: 2019-01-01 | End: 2019-01-01

## 2019-01-01 RX ORDER — POLYETHYLENE GLYCOL 3350 17 G/17G
17 POWDER, FOR SOLUTION ORAL DAILY PRN
COMMUNITY

## 2019-01-01 RX ORDER — HYDROCODONE BITARTRATE AND ACETAMINOPHEN 10; 325 MG/1; MG/1
1 TABLET ORAL EVERY 4 HOURS PRN
Qty: 20 TABLET | Refills: 0 | Status: ON HOLD | OUTPATIENT
Start: 2019-01-01 | End: 2019-01-01

## 2019-01-01 RX ORDER — SODIUM CHLORIDE 0.9 % (FLUSH) 0.9 %
10 SYRINGE (ML) INJECTION EVERY 12 HOURS
Qty: 1 SYRINGE | Refills: 0 | Status: SHIPPED | COMMUNITY
Start: 2019-01-01

## 2019-01-01 RX ORDER — FUROSEMIDE 10 MG/ML
40 INJECTION INTRAMUSCULAR; INTRAVENOUS ONCE
Status: COMPLETED | OUTPATIENT
Start: 2019-01-01 | End: 2019-01-01

## 2019-01-01 RX ORDER — HYDROCODONE BITARTRATE AND ACETAMINOPHEN 5; 325 MG/1; MG/1
1 TABLET ORAL ONCE
Status: DISCONTINUED | OUTPATIENT
Start: 2019-01-01 | End: 2019-01-01

## 2019-01-01 RX ORDER — ESOMEPRAZOLE MAGNESIUM 40 MG/1
40 CAPSULE, DELAYED RELEASE ORAL
Qty: 90 CAPSULE | Refills: 1 | Status: ON HOLD | OUTPATIENT
Start: 2019-01-01 | End: 2019-01-01

## 2019-01-01 RX ORDER — FLUTICASONE PROPIONATE 50 MCG
1 SPRAY, SUSPENSION (ML) NASAL DAILY
COMMUNITY

## 2019-01-01 RX ORDER — SODIUM CHLORIDE 9 MG/ML
INJECTION, SOLUTION INTRAVENOUS CONTINUOUS
Status: DISCONTINUED | OUTPATIENT
Start: 2019-01-01 | End: 2019-01-01

## 2019-01-01 RX ORDER — ALLOPURINOL 100 MG/1
200 TABLET ORAL DAILY
Qty: 90 TABLET | Refills: 0 | Status: SHIPPED | OUTPATIENT
Start: 2019-01-01

## 2019-01-01 RX ORDER — CEPHALEXIN 500 MG/1
500 CAPSULE ORAL 3 TIMES DAILY
Qty: 12 CAPSULE | Refills: 0 | Status: SHIPPED | COMMUNITY
Start: 2019-01-01 | End: 2019-01-01

## 2019-01-01 RX ORDER — FUROSEMIDE 10 MG/ML
40 INJECTION INTRAMUSCULAR; INTRAVENOUS
Status: DISCONTINUED | OUTPATIENT
Start: 2019-01-01 | End: 2019-01-01

## 2019-01-01 RX ORDER — FUROSEMIDE 20 MG/1
20 TABLET ORAL DAILY
Qty: 30 TABLET | Refills: 11 | Status: SHIPPED | OUTPATIENT
Start: 2019-01-01 | End: 2019-01-01

## 2019-01-01 RX ORDER — METOPROLOL SUCCINATE 100 MG/1
100 TABLET, EXTENDED RELEASE ORAL DAILY
COMMUNITY

## 2019-01-07 NOTE — TELEPHONE ENCOUNTER
From: Amber Aldana  To: Ai Capone MD  Sent: 1/6/2019 1:10 PM CST  Subject: Prescription Question    Hi  My daughter Rayshawn Mishra is setting me up on the [employer] mail order program. She has determined that I will save over $1500 by using [employer]

## 2019-02-04 NOTE — TELEPHONE ENCOUNTER
Patient's daughter, Alex Ledesma, called and requested paper scripts for Norco and a rash ointment that she used to take few years ago. Please call Alex Ledesma when scripts are ready to be picked up.

## 2019-02-04 NOTE — TELEPHONE ENCOUNTER
Daughter is requesting Fluocinide ointment that the pt uses occasionally for intermittent dermatitis. Rx routed to Dr. Matthew Solis.

## 2019-02-28 VITALS
WEIGHT: 152 LBS | DIASTOLIC BLOOD PRESSURE: 70 MMHG | BODY MASS INDEX: 23.04 KG/M2 | HEIGHT: 68 IN | HEART RATE: 70 BPM | SYSTOLIC BLOOD PRESSURE: 126 MMHG

## 2019-03-15 NOTE — TELEPHONE ENCOUNTER
The pt is taking furosemide 10mg daily. This Rx is prescribed by Dr. Deidre Stone. The pt does have chronic systolic congestive heart failure, PAD and hypertension. The pt does have screening labs pending form 1/2019.      The daughter reports the pt's spasms/tr

## 2019-03-15 NOTE — TELEPHONE ENCOUNTER
Pt is having spasm's in leg & arms, they would like to stop her lasik and see if that helps, call back

## 2019-03-18 NOTE — TELEPHONE ENCOUNTER
The daughter reports symptoms have slightly improved over the weekend.     The pt is currently taking:   Metoprolol 100mg daily  Amlodipine 5mg daily    BP readings over the weekend: /80, 144/80, 152/51 P68-78  Today: /91; 148/77; 139/75 P72    T

## 2019-03-27 NOTE — TELEPHONE ENCOUNTER
Fax request from [address] [place] [address] for a new rx request for She Lancets qty 100 with one dx code since Medicare only covers one. Please see fax in triage.

## 2019-03-28 NOTE — TELEPHONE ENCOUNTER
Relayed results and recommendations to Rayshawn Mishra. She verbalized understanding and agreed with plan. Orders sent.    Medication list updated to show changes

## 2019-03-28 NOTE — TELEPHONE ENCOUNTER
----- Message from Maine Iqbal MD sent at 3/28/2019  1:16 PM CDT -----  Review at follow up  Increase levothyroxine to 125 mcg daily and recheck tsh in 6 weeks  Pt with hyponatremia. Start 1.5 liter fluid intake.  Suspect due to hypothyroidism and/or nort

## 2019-03-29 NOTE — TELEPHONE ENCOUNTER
The following was discussed with the pt's daughter:     Increase levothyroxine to 125 mcg daily and recheck tsh in 6 weeks  Pt with hyponatremia. Start 1.5 liter fluid intake. Suspect due to hypothyroidism and/or nortriptyline .  Recommend stopping nortript

## 2019-03-29 NOTE — TELEPHONE ENCOUNTER
Patient's daughter, called, and wanted to speak with a nurse regarding the recommendation given yesterday by Diann Mehta RN, and also Ana Maria wants to ask additional questions.

## 2019-04-02 PROBLEM — J18.9 PNEUMONIA DUE TO INFECTIOUS ORGANISM, UNSPECIFIED LATERALITY, UNSPECIFIED PART OF LUNG: Status: ACTIVE | Noted: 2019-01-01

## 2019-04-02 PROBLEM — R09.02 HYPOXEMIA: Status: ACTIVE | Noted: 2019-01-01

## 2019-04-02 PROBLEM — E87.1 HYPONATREMIA: Status: ACTIVE | Noted: 2019-01-01

## 2019-04-02 PROBLEM — I50.9 ACUTE ON CHRONIC CONGESTIVE HEART FAILURE, UNSPECIFIED HEART FAILURE TYPE (HCC): Status: ACTIVE | Noted: 2019-01-01

## 2019-04-02 NOTE — ED INITIAL ASSESSMENT (HPI)
Pt c/o cough, congestion. Pt saw her doctor and dx with pneumonia. Pt had a pill stuck in her throat last week and daughter thinks she may have aspirated.

## 2019-04-02 NOTE — ED PROVIDER NOTES
Patient Seen in: [facility] Emergency Department    History   Patient presents with:  Dyspnea ALISON SOB (respiratory)    Stated Complaint: alison    HPI    15-year-old female sent by primary care physician for admission to the hospital for hyponatremia. and unspecified hyperlipidemia 6/29/2012   • Other and unspecified noninfectious gastroenteritis and colitis 6/29/2012   • Other nonspecific findings on examination of blood(790.99) 4/8/2014   • PAD [facility]peripheral [facility] disease) ([facility] 4/8/2014   • Polyneurop 7/1/2014    Performed by America Peterson MD at [facility] MAIN OR   • LAMINECTOMY     • ORAL SURGERY PROCEDURE  age 25    wisdom teeth   • OTHER SURGICAL HISTORY      carotid artery surgery   • OTHER SURGICAL HISTORY  10/2011    endartarectomy (left femoral)    • REM BUN/CREA Ratio 30.3 (*)     GFR, Non- 39 (*)     GFR, -American 45 (*)     Albumin 2.7 (*)     Globulin  4.5 (*)     A/G Ratio 0.6 (*)     All other components within normal limits   PRO BETA NATRIURETIC PEPTIDE - Abnormal; Notable f STATED HISTORY: (As transcribed by Technologist)  Patient c/o cough and sob since 3/28/19. Patient has had 5 bypass surgeries and stents placed.         CONCLUSION:    Worsening appearance, with increasing cardiomegaly, increasing vascular congestion and de

## 2019-04-02 NOTE — TELEPHONE ENCOUNTER
I dicussed test results with daughter Lela Self, hyponatremia continues. Dr. Stacey Albarran and I recommend hospital admission for IV diuretics and close observation of hyponatremia d/t acute heart failure.  Daughter agrees with plan of care

## 2019-04-02 NOTE — PROGRESS NOTES
HPI:    Patient ID: Rickey Welsh is a 80year old female. Patient presents with:  Edema: lower ext. Cough: since Thursday    Patient and daughter, Madhav Rajput, present today d/t increased lower extremity edema, new cough, and feeling weak.  Daughter state • Cellulitis and abscess of leg, except foot 6/29/2012   • Chronic sinusitis    • Cong[facility] heart disease ([facility]    • Coronary atherosclerosis of unspecified type of vessel, native or graft 6/29/2012   • Cough due to ACE inhibitor 4/8/2014   • CVA (cerebra 3/19/2009   • ANGIOPLASTY (CORONARY)      2/11/2011   • CABG  11/1990    (x5), 5 vessel [facility])   • CAROTID ENDARTERECTOMY  10/17/1995    left   • CAROTID ENDARTERECTOMY  02/28/1996    rt   • CATARACT  2009    right   • COLONOSCOPY N/A 7/15/2014 Smokeless tobacco: Never Used    Substance and Sexual Activity      Alcohol use: No      Drug use: No    Other Topics      Concerns:        Caffeine Concern: No        Exercise: Yes          pt         Current Outpatient Medications:  Levothyroxine Sod SSD 1 % Apply Externally Cream Apply to rash as needed Disp:  Rfl: 3   aspirin 81 MG Oral Tab Take 81 mg by mouth daily. Disp:  Rfl:    Calcium-Vitamin D (CALTRATE 600 PLUS-VIT D OR) Take  by mouth daily.  Disp:  Rfl:    FLONASE 50 MCG/ACT NA SUSP 1-2 spray No results found for: IRON, IRONTOT  No results found for: B12, VITB12  Lab Results   Component Value Date    PHOS 3.1 11/23/2014     Lab Results   Component Value Date    MG 2.4 03/27/2018        PHYSICAL EXAM:   /68   Pulse 76   Temp 97.8 °F (36.6 Hyponatremia means low sodium levels in the blood. This condition most often occurs after prolonged vomiting or diarrhea, which causes your body to lose too much water and sodium.  It can also result from drinking excess amounts of water or the use of diure Your healthcare provider has prescribed a soft diet (also called gastrointestinal soft diet). This means eating foods that are soft, low in fiber, and easy to digest. This diet is for people with digestive problems.  A soft diet provides foods that are easy OK: Avocado, banana, baked peeled apple, applesauce, peeled ripe peaches or pears, canned fruit (apricots, cherries, peaches, pears), melons  Avoid: Raw apple, dried fruits, coconut, pineapple, grapes, fruit kimani, fruit snacks  Meat and fish  OK:  All f · Ice cream without seeds or nuts  · Smooth peanut butter  · Eggs  · Fish, turkey, chicken, or other meat that is not tough or stringy  · Tofu  Foods to avoid  · Nuts and seeds  · Snack foods, such as the following:  ? Chocolate-containing snacks, candy, p

## 2019-04-02 NOTE — H&P
DENIS HOSPITALIST  History and Physical     [facility] Patient Status:  Inpatient    1929 MRN [id number]   [facility] 2NE-A Attending Mihaela Villalta MD   Hosp Day # 0 PCP Kary Ascencio MD     Chief Complaint: Increased lower ex (osteoarthritis) 4/8/2014   • Open wound of foot except toe(s) alone, without mention of complication 8/65/5063    diabetic foot ulcer/L great toe   • Other and unspecified hyperlipidemia 6/29/2012   • Other and unspecified noninfectious gastroenteritis an REPLACEMENT SURGERY  7/1998, 5/2000    left side- 1998, right side- 2000   • KNEE REPLACEMENT SURGERY     • KNEE REPLACEMENT SURGERY  9/16/2010    right   • KNEE TOTAL REVISION Right 7/1/2014    Performed by Shukri De La Torre MD at [address] [facility] 9 weeks Disp: 60 tablet Rfl: 0   amLODIPine Besylate 5 MG Oral Tab Take 1 tablet (5 mg total) by mouth daily. Disp: 90 tablet Rfl: 1   Clopidogrel Bisulfate 75 MG Oral Tab Take 1 tablet (75 mg total) by mouth once daily.  Disp: 90 tablet Rfl: 1   HYDROcodone- murmurs, rubs or gallops. Equal pulses. Chest and Back: No tenderness or deformity. Abdomen: Soft, nontender, nondistended. Positive bowel sounds. No rebound, guarding or organomegaly. Neurologic: No focal neurological deficits.  CNII-XII grossly intac Initial Certification    Patient will require inpatient services that will reasonably be expected to span two midnight's based on the clinical documentation in H+P.    Based on patients current state of illness, I anticipate that, after discharge, patient w

## 2019-04-03 NOTE — PHYSICAL THERAPY NOTE
PHYSICAL THERAPY EVALUATION - INPATIENT     Room Number: 3328/8619-T  Evaluation Date: 4/3/2019  Type of Evaluation: Initial  Physician Order: PT Eval and Treat    Presenting Problem: PNA  Reason for Therapy: Mobility Dysfunction and Discharge Planni alone, without mention of complication 8/72/8953    diabetic foot ulcer/L great toe   • Other and unspecified hyperlipidemia 6/29/2012   • Other and unspecified noninfectious gastroenteritis and colitis 6/29/2012   • Other nonspecific findings on examinati MD at [facility] ENDOSCOPY   • [facility] CORONARY DR[facility] ELUTING STENT SINGLE      x2   • HIP REPLACEMENT SURGERY     • HIP REPLACEMENT SURGERY  7/1998, 5/2000    left side- 1998, right side- 2000   • KNEE REPLACEMENT SURGERY     • KNEE REPLACEMENT SURGERY  9/16/2010 person  · Following Commands:  follows multistep commands with increased time and follows multistep commands with repetition  · Safety Judgement:  decreased awareness of need for assistance and decreased awareness of need for safety    RANGE OF MOTION AND diffuse body pain. Transfers to EOB with assist of draw and total assist (mod a x 2). Patient sat at EOB x 7 minutes with min/mod a at times for balance due to LOB posteriorly. Transfers sit to stand to rw with mod/max a x 2.   SPT bed to chair with rw w assistance     Goal #2 Patient is able to demonstrate transfers EOB to/from Chair/Wheelchair at assistance level: minimum assistance     Goal #3 Patient is able to sit at EOB x 5 minutes with sba     Goal #4    Goal #5    Goal #6    Goal Comments: Goals es

## 2019-04-03 NOTE — CM/SW NOTE
04/03/19 1600   CM/SW Referral Data   Referral Source Physician   Reason for Referral Protocol order set   Specify order set Pneumonia   Informant Children;Patient   Patient Info   Patient's Mental Status Alert;Oriented   Patient's Home Environment Hous

## 2019-04-03 NOTE — OCCUPATIONAL THERAPY NOTE
OCCUPATIONAL THERAPY EVALUATION - INPATIENT     Room Number: 8241/3610-I  Evaluation Date: 4/3/2019  Type of Evaluation: Initial  Presenting Problem: PNA    Physician Order: IP Consult to Occupational Therapy  Reason for Therapy: ADL/IADL Dysfunction and D except toe(s) alone, without mention of complication 6/40/6495    diabetic foot ulcer/L great toe   • Other and unspecified hyperlipidemia 6/29/2012   • Other and unspecified noninfectious gastroenteritis and colitis 6/29/2012   • Other nonspecific finding Daryl Lion MD at [facility] ENDOSCOPY   • [facility] CORONARY DR[facility] ELUTING STENT SINGLE      x2   • HIP REPLACEMENT SURGERY     • HIP REPLACEMENT SURGERY  7/1998, 5/2000    left side- 1998, right side- 2000   • KNEE REPLACEMENT SURGERY     • KNEE REPLACEME body\"  Management Techniques: Activity promotion; Body mechanics;Breathing techniques;Relaxation;Repositioning    COGNITION  Overall Cognitive Status:  WFL - within functional limits    VISION  Current Vision: no visual deficits    PERCEPTION  Overall Perc assist x2, therapist assist pt to complete t/f to chair, education on OOB activity, pt educated on HEP for UE, pt educated on POC and D/C recommendation. Patient End of Session: Up in chair;Needs met;RN aware of session/findings;Call light within reach; Al training;UE strengthening/ROM; Functional transfer training  Rehab Potential : Good  Frequency (Obs): 5x/week  Number of Visits to Meet Established Goals: 5    ADL Goals   Patient will perform upper body dressing:  with supervision  Patient will perform low

## 2019-04-03 NOTE — PLAN OF CARE
Pt denies pain at this time. Pt is A & O x 4. Pt is NSR on tele, S1 and S2 present and pt denies cardiac symptoms. Lung sounds diminished bilaterally w/ expiratory wheezing noted. Continuing to monitor. Abdomen soft and round. +1 edema noted in BLE.  Plan o

## 2019-04-03 NOTE — SLP NOTE
ADULT SWALLOWING EVALUATION    ASSESSMENT    ASSESSMENT/OVERALL IMPRESSION:  Patient seen for swallowing evaluation as she was admitted with SOB and daughter reports she had a recent choking episode while taking medication.   She denies chronic issues with Acute myocardial infarction ([facility] 3/19/2012   • Anxiety state    • Atherosclerosis of coronary artery    • Atrial fibrillation ([facility] 6/29/2012   • Back pain 4/8/2014    Lumbar    • Cancer ([facility]     skin cancer   • Carotid stenosis 4/8/2014   • Carpal tunne since   • Seizures ([facility]    • Stroke ([facility] 4/8/2014   • Type II or unspecified type diabetes mellitus without mention of complication, not stated as uncontrolled    • Unspecified essential hypertension 6/29/2012   • Unspecified hypothyroidism 6/29/2012   • esophageal involvement            GOALS  Goal #1 Patient will participate in VFSS  In Progress     FOLLOW UP  Treatment Plan/Recommendations: Videofluoroscopic swallow study  Number of Visits to Meet Established Goals: 1  Follow Up Needed: Yes  SLP Follow-

## 2019-04-03 NOTE — DIETARY NOTE
[address]     Admitting diagnosis:  Hypoxemia [R09.02]  Hyponatremia [E87.1]  Pneumonia due to infectious organism, unspecified laterality, unspecified part of lung [J18.9]  Acute on chronic congestive heart f

## 2019-04-04 NOTE — VIDEO SWALLOW STUDY NOTE
ADULT VIDEOFLUOROSCOPIC SWALLOWING STUDY    Admission Date: 4/2/2019  Evaluation Date: 04/04/19  Radiologist: Dr. Vale Steele: Regular  Diet Recommendations - Liquid:  Thin    Further Follow-up:  Follow Up Needed: swelling 9/15/2011    lower leg localized swelling   • Measles 9/16/2010   • Mitral valve disorders(424.0) 6/29/2012   • Muscle weakness    • Neuropathy    • OA (osteoarthritis) 4/8/2014   • Open wound of foot except toe(s) alone, without mention of compli Reason for Referral: R/O aspiration(recent choking episode with pills)    Family/Patient Goals:   To get better and go home     ASSESSMENT   DYSPHAGIA ASSESSMENT  Test completed in conjunction with Radiologist.  Patient Positioned: Upright;Midline;[facility] c Radiologist; agreement verbalized.     Patient Experiencing Pain: No                FOLLOW UP  Treatment Plan/Recommendations: No further inpatient SLP service warranted  Number of Visits to Meet Established Goals: 1      Thank you for your referral.   If y

## 2019-04-04 NOTE — PROGRESS NOTES
DENIS HOSPITALIST  Progress Note     Dora Marlena Patient Status:  Inpatient    1929 MRN [id number]   [facility] 2NE-A Attending Marlys Calix MD   Hosp Day # 2 PCP Hai Walls MD     Chief Complaint: sob  S: Patient feeling Clearance: 35 mL/min (A) (based on SCr of 1.14 mg/dL (H)). No results for input(s): PTP, INR in the last 168 hours. No results for input(s): TROP, CK in the last 168 hours. Imaging: Imaging data reviewed in Epic.     Medications:   • lisinopri

## 2019-04-04 NOTE — CONSULTS
[facility] Cardiology Consultation Magali Mendoza MD    The patient was interviewed, examined, the chart was reviewed and the consult was dictated. This is a 80year old female with a chief complaint of shortness of breath. Impression:  1.   Mild acute decomp

## 2019-04-04 NOTE — PLAN OF CARE
4/4/2019  Patient is alert and oriented x4. Pt feeling a lot better tonight. . NSR on tele. Oxygenation is 94% on RA. L sounds diminished with expiratory wheezes. VSS. Patient denies chest pain, shortness of breath, palpitations. Denies pain.  Pt iv was leak

## 2019-04-04 NOTE — CONSULTS
Heart Failure Navigator Progress Note    Patient was evaluated by the Heart Failure Navigator for understanding, verbalization, demonstration, and recall of education related to heart failure, overall adherence to the behaviors necessa protein and nutrient intake. Gave them my contact information and instructed them to call for further questions or concerns. Pt and Madhav Rajput voiced understanding of the above and are in agreement with the plans.  Plans are for discharge to [facility] for

## 2019-04-05 NOTE — CONSULTS
[facility]    PATIENT'S NAME: Nelly Sportsman   ATTENDING PHYSICIAN: Janneth Christensen M.D.   Geofm Givens: Delmar Bell M.D.    PATIENT ACCOUNT#:   [de-identified]    LOCATION:  [address] [place] [address]  MEDICAL RECORD #:   [id number]       DATE OF BIRTH: since.  On the ER admission, patient's chest x-ray had some increased interstitial infiltrates.   She complained of peripheral edema, and since receiving IV Lasix and having a significant urine output with over 2 L out greater than in, she is improved with Decompensation of long compensated heart failure after dismissing diuretic and increasing amlodipine dose. PLAN:  Reinitiate Lasix with IV diuretics for now. Cut amlodipine dose. We will discuss with Renal Services.     Dictated By Tucker Guerin M.D.

## 2019-04-05 NOTE — PHYSICAL THERAPY NOTE
PHYSICAL THERAPY TREATMENT NOTE - INPATIENT    Room Number: 7299/0568-H     Session: 1  Number of Visits to Meet Established Goals: 6    Presenting Problem: PNA    Problem List  Principal Problem:    Hypoxemia  Active Problems:    GLEN (acute kidney injury colitis 6/29/2012   • Other nonspecific findings on examination of blood(790.99) 4/8/2014   • PAD (peripheral artery disease) ([facility] 4/8/2014   • Per[facility] vascular [facility]    • Pneumonia due to organism    • Polyneuropathy in diabetes 6/29/2012   • REPLACEMENT SURGERY     • KNEE REPLACEMENT SURGERY  9/16/2010    right   • KNEE TOTAL REVISION Right 7/1/2014    Performed by Kina Shearer MD at [facility] MAIN OR   • LAMINECTOMY     • ORAL SURGERY PROCEDURE  age 25    wisdom teeth   • OTHER SURGICAL HISTORY Total       AM-PAC Score:  Raw Score: 6   Approx Degree of Impairment: 100%   Standardized Score (AM-PAC Scale): 23.55   CMS Modifier (G-Code): CN    FUNCTIONAL ABILITY STATUS  Gait Assessment   Gait Assistance: Not tested  Distance (ft): 0  Assistive Tripp assistance level: minimum assistance      Goal #3 Patient is able to sit at EOB x 5 minutes with sba      Goal #4     Goal #5     Goal #6     Goal Comments: Goals established on 4/3/2019; Goals still ongoing 4/5/2019

## 2019-04-05 NOTE — PLAN OF CARE
Problem: Patient/Family Goals  Goal: Patient/Family Long Term Goal  Patient's Long Term Goal: To manage symptoms    Interventions:  - Attend follow up appointments  - Take medications  - See additional Care Plan goals for specific interventions   Outcome: stability  - Promote increasing activity/tolerance for mobility and gait  - Educate and engage patient/family in tolerated activity level and precautions  - Recommend use of  RW for transfers and ambulation assist x 2   Outcome: Progressing      Problem: I

## 2019-04-05 NOTE — CM/SW NOTE
SW follow up. Updates sent to [facility], they can accept pt for TIM at d/c. Updated Pt and daughter of same. SW/CM will follow.

## 2019-04-05 NOTE — PROGRESS NOTES
DENIS HOSPITALIST  Progress Note     Jaziel Fall Patient Status:  Inpatient    1929 MRN [id number]   [facility] 2NE-A Attending Kary Dumas MD   Hosp Day # 3 PCP Calista Gutierrez MD     Chief Complaint: sob  S: Patient better, --    --    --    AST  31  33  35   --    --    --    ALT  30  29  32   --    --    --    BILT  0.4  0.4  0.4   --    --    --    TP  7.5  7.2  6.6   --    --    --        Estimated Creatinine Clearance: 36.2 mL/min (A) (based on SCr of 1.1 mg/dL (H)).

## 2019-04-05 NOTE — PROGRESS NOTES
[facility]  Nephrology Progress Note    Shahla Good Attending:   Lydia Matute MD       Assessment and Plan:    1) Hyponatremia- has been intermittently hyponatremic > 10 yrs due to combination of age-related impairment in renal diluting capacit Lab Results   Component Value Date    CREATSERUM 1.10 04/05/2019    BUN 24 04/05/2019     04/05/2019    K 4.4 04/05/2019    CL 97 04/05/2019    CO2 30.0 04/05/2019    GLU 99 04/05/2019    CA 8.9 04/05/2019    PGLU 106 04/05/2019       Imaging:   All

## 2019-04-05 NOTE — CONSULTS
[facility]  Report of Consultation    [facility] Patient Status:  Inpatient    1929 MRN [id number]   [place] 2NE-A Attending Jina Prabhakar MD   Hosp Day # 2 PCP Cora Cartagena MD       Assessment / Plan:    1) Hyponatre • Hearing impairment    • Heart attack [facility])    • High blood pressure    • High cholesterol    • Hypercholesterolemia 4/8/2014   • LBBB (left bundle branch block) 4/8/2014   • Leg swelling 9/15/2011    lower leg localized swelling   • Measles 9/16/2010 rt   • CATARACT  2009    right   • CATH DRUG ELUTING STENT     • CATH PERCUTANEOUS  TRANSLUMINAL CORONARY ANGIOPLASTY     • COLONOSCOPY N/A 7/15/2014    Performed by Tyra Bonilla MD at [facility] ENDOSCOPY   • COLONOSCOPY N/A 7/14/2014    Performed by Russ Greene spasms    Medications:    Current Facility-Administered Medications:   •  lisinopril (PRINIVIL,ZESTRIL) tab 5 mg, 5 mg, Oral, Daily  •  aspirin EC tab 81 mg, 81 mg, Oral, Nightly  •  Clopidogrel Bisulfate (PLAVIX) tab 75 mg, 75 mg, Oral, Daily  •  HYDROcod alert  HEENT: No scleral icterus, MMM  Neck: Supple, no VINH or thyromegaly  Cardiac: Regular rate and rhythm, S1, S2 normal, no murmur, rub, or gallop  Lungs: Decreased breath sounds at the bases bilaterally.    Abdomen: Soft, non-tender. + bowel sounds, no

## 2019-04-05 NOTE — PROGRESS NOTES
[facility] Cardiology Progress Note - Ambar Yolande Patient Status:  Inpatient    1929 MRN [id number]   [facility] 2NE-A Attending Sabrina Andino MD   Hosp Day # 3 PCP Joel Hope MD     Subjective:  Patient type ([facility]     Hyponatremia     Pneumonia due to infectious organism, unspecified laterality, unspecified part of lung      Objective:   Temp: 97.5 °F (36.4 °C)  Pulse: 79  Resp: 16  BP: 162/57    Intake/Output:     Intake/Output Summary (Last 24 hours) at 1.9   --    --    GLU  212*  272*  63*  102*   --   99       Recent Labs   Lab  04/01/19   1136  04/02/19   1222  04/03/19   0409   ALT  30  29  32   AST  31  33  35   ALB  2.8*  2.7*  2.4*       No results for input(s): TROP in the last 168 hours.     Lele Hu Cardiovascular:  See HPI  GI: denies nausea, vomiting,   Genital/: no dysuria,   Musculoskeletal: no joint complaints upper or lower extremities  Neuro: no sensory or motor complaint  Psyche: no symptoms of depression or anxiety  Hematology: denies bru

## 2019-04-06 NOTE — PLAN OF CARE
Air splint received from [employer], applied to right ankle. Assisted out of bed using sit to stand , complains of pain with movement or palpation of ankle.  Medicated for pain with 1 [place]

## 2019-04-06 NOTE — PLAN OF CARE
[employer], ER Dept as well as Physical Therapy contacted  for [employer] splint.  Awaiting response  for availability in hospital

## 2019-04-06 NOTE — PROGRESS NOTES
[facility] Cardiology Progress Note - Scarlett Martinez Patient Status:  Inpatient    1929 MRN [id number]   [facility] 2NE-A Attending Rhianna Haney MD   Hosp Day # 4 PCP Cami Billingsley MD     Subjective:  Chief c Hypothyroidism due to acquired atrophy of thyroid     PAD (peripheral artery disease) (HCC)     Essential hypertension with goal blood pressure less than 130/80     Painful diabetic neuropathy (HCC)     Primary osteoarthritis of both knees     Uncontrolled 04/02/19  1222 04/03/19  0409   WBC 11.3* 10.2   HGB 10.9* 10.2*   MCV 95.1 94.6   .0 304.0       Recent Labs   Lab 04/02/19  1222 04/03/19  0409 04/04/19  0528 04/04/19 2123 04/05/19  0525 04/06/19  0613   * 133* 132*  --  132* 131*   K 4.9 2-10 Units Subcutaneous TID CC and HS   furosemide (LASIX) injection 40 mg 40 mg Intravenous BID (Diuretic)       ROS:  General Health: otherwise feels well, weight stable  Constitutiona: no recent fevers  Skin: denies any unusual skin lesions or rashes  E

## 2019-04-06 NOTE — PROGRESS NOTES
Ok to d/c today if cleared by surgery and if ortho can recommend a shoe/boot/brace for left lateral malleolar fracture.

## 2019-04-06 NOTE — PLAN OF CARE
Impaired Functional Mobility    • Achieve highest/safest level of mobility/gait Progressing          Room air.  Crackles noted in bases, 95% spO2 at rest  Reporting \" twisted ankle today when PT moved me, I felt a POP \", right ankle wrapped with ace coughlin

## 2019-04-06 NOTE — PROGRESS NOTES
[facility]  Nephrology Progress Note    Jessica Mckinley Patient Status:  Inpatient    1929 MRN [id number]   [facility] 2NE-A Attending Ana Cui MD   Hosp Day # 4 PCP Anita Allison MD       SUBJECTIVE:  No acute events, de --   --   --    * 63* 102*  --  99 108*       Recent Labs   Lab 04/01/19  1136 04/02/19  1222 04/03/19  0409   ALT 30 29 32   AST 31 33 35   ALB 2.8* 2.7* 2.4*       Recent Labs   Lab 04/04/19 2116 04/05/19  0717 04/05/19  1226 04/05/19  1700 04/0 bedside.           Zeynep Dugan  4/6/2019  9:58 AM

## 2019-04-07 NOTE — PROGRESS NOTES
[facility] Cardiology Progress Note - Ambar Lanier Patient Status:  Inpatient    1929 MRN [id number]   [facility] 2NE-A Attending Sabrina Andino MD   Hosp Day # 5 PCP Joel Hope MD     Assessment  -Mild ac negative  Respiratory: denies shortness of breath, wheezing or cough   Cardiovascular:  See HPI  GI: denies nausea, vomiting,   Genital/: no dysuria,   Musculoskeletal: right ankle pain.    Neuro: no sensory or motor complaint  Psyche: no symptoms of depr kg)  04/03/19 0438 : 177 lb 4 oz (80.4 kg)  04/02/19 1158 : 165 lb 5.5 oz (75 kg)  12/10/18 1324 : 165 lb (74.8 kg)  08/25/18 1236 : 153 lb (69.4 kg)      Tele: NSR with frequent PVCs    Physical Exam:    General: Alert and oriented x 3.  No apparent distre (PRINIVIL,ZESTRIL) tab 10 mg 10 mg Oral Daily   aspirin EC tab 81 mg 81 mg Oral Nightly   Clopidogrel Bisulfate (PLAVIX) tab 75 mg 75 mg Oral Daily   HYDROcodone-acetaminophen (NORCO)  MG per tab 1 tablet 1 tablet Oral Q6H PRN   Levothyroxine Sodium

## 2019-04-07 NOTE — PLAN OF CARE
Plan for discharge to the [facility] 4/7 after PT sees patient for brace education  Right ankle wrapped with ACE wrap, ice and norco prn for pain, Air splint in place  Fluid restriction  Pure wick in place  Up with 2 assist and sit to stand lift  Vital signs

## 2019-04-07 NOTE — CM/SW NOTE
04/07/19 1400   Discharge disposition   Expected discharge disposition Skilled Nurs   Name of [address]   Discharge transportation [facility] to transport to [address] [place] [address] at 430pm.  RN to

## 2019-04-07 NOTE — PROGRESS NOTES
[facility]  Nephrology Progress Note    Phill Snellen Patient Status:  Inpatient    1929 MRN [id number]   [facility] 2NE-A Attending Roberto Jimenes MD   Hosp Day # 5 PCP Ashvin Bess MD       SUBJECTIVE:  No acute events, no --   --   --    * 63* 102*  --  99 108*       Recent Labs   Lab 04/01/19  1136 04/02/19  1222 04/03/19  0409   ALT 30 29 32   AST 31 33 35   ALB 2.8* 2.7* 2.4*       Recent Labs   Lab 04/06/19  0729 04/06/19  1216 04/06/19  1611 04/06/19 2057 04/0 were discussed with patient and/or family by bedside.           Minnie Lora MD  4/7/2019  10:43 AM

## 2019-04-07 NOTE — PLAN OF CARE
Problem: Patient/Family Goals  Goal: Patient/Family Long Term Goal  Description  Patient's Long Term Goal: To manage symptoms    Interventions:  - Attend follow up appointments  - Take medications  - See additional Care Plan goals for specific interventi Promote sitting position while performing ADLs such as feeding, grooming, and bathing  - Educate and encourage patient/family in tolerated functional activity level and precautions during self-care     Outcome: Progressing     Problem: Diabetes/Glucose Con

## 2019-04-07 NOTE — CM/SW NOTE
RN informed sw that pt ok to dc to The [address]- message left at [facility] return call. Bri Marrero, [address] [facility] /Dischage Planner  [phone]  Pager [phone]

## 2019-04-07 NOTE — PLAN OF CARE
Problem: Patient/Family Goals  Goal: Patient/Family Long Term Goal  Description  Patient's Long Term Goal: To manage symptoms    Interventions:  - Attend follow up appointments  - Take medications  - See additional Care Plan goals for specific interventi and ambulation assist x 2   4/7/2019 1701 by Yenny Harris RN  Outcome: Completed  4/7/2019 1024 by Yenny Harris RN  Outcome: Progressing     Problem: Impaired Activities of Daily Living  Goal: Achieve highest/safest level of independence in self care

## 2019-04-07 NOTE — PHYSICAL THERAPY NOTE
PHYSICAL THERAPY TREATMENT NOTE - INPATIENT    Room Number: 4891/5030-C     Session:    Number of Visits to Meet Established Goals: 6    Presenting Problem: PNA    Problem List  Principal Problem:    Hypoxemia  Active Problems:    GLEN (acute kidney injury toe(s) alone, without mention of complication 3/96/0257    diabetic foot ulcer/L great toe   • Other and unspecified hyperlipidemia 6/29/2012   • Other and unspecified noninfectious gastroenteritis and colitis 6/29/2012   • Other nonspecific findings on ex Neha Carolina MD at [facility] ENDOSCOPY   • [facility] [place] [facility] CORONARY DR[facility] ELUTING STENT SINGLE      x2   • HIP REPLACEMENT SURGERY     • HIP REPLACEMENT SURGERY  7/1998, 5/2000    left side- 1998, right side- 2000   • KNEE REPLACEMENT SURGERY     • KNEE REPLACEMENT SURGERY  9 sitting on the side of the bed?: A Lot   How much help from another person does the patient currently need. ..   -   Moving to and from a bed to a chair (including a wheelchair)?: A Little   -   Need to walk in hospital room?: A [address] [place] [address] 3-5 st addressed; Family present    ASSESSMENT   Pt reported R ankle pain at the start of the session but is agreeable to PT session. Pt needed some assistance with L LAQ, B hip flex but was able to perform B hip abd/add actively without assistance.   Pt still nee

## 2019-04-07 NOTE — PLAN OF CARE
Received bedside report on this Pt., at 1530. Pt., awake, A&Ox4, can be forgetful at times per report. Pt., is SR with bundle branch block (not new) on Tele monitor, sats greater than 92% on RA, denies any pain or distress. Pt's daughter visited.   POC d

## 2019-04-08 NOTE — PROGRESS NOTES
Zeeshan Quiñones  : 1929  Age 80year old  female patient is admitted to Facility: The [address] [place] [address] at [facility].     [address] [place] [address] Drive date:  19  Discharge date to [facility]:  19-  ELOS:  11-13 days the loss of her sister-in-law two weeks ago. She stated that her brother is one year younger than herself and she was worried about him. Otherwise, the patient was redistractable and following instructions from the OT.   She said her appetite was \"OK,\" blood(790.99) 4/8/2014   • PAD (peripheral artery disease) ([facility] 4/8/2014   • Peripheral vascular disease ([facility]    • Pneumonia due to organism    • Polyneuropathy in diabetes 6/29/2012   • Primary localized osteoarthrosis, upper arm 6/20/2013    Log Date: REVISION Right 7/1/2014    Performed by America Peterson MD at [address] [facility] OR   • LAMINECTOMY     • ORAL SURGERY PROCEDURE  age 25    wisdom teeth   • OTHER SURGICAL HISTORY      carotid artery surgery   • OTHER SURGICAL HISTORY  10/2011    endartarectomy (left fe lisinopril 10 MG Oral Tab Take 1 tablet (10 mg total) by mouth daily. Disp: 30 tablet Rfl: 3   Metoprolol Succinate  MG Oral Tablet 24 Hr Take 100 mg by mouth daily. Disp:  Rfl:    Montelukast Sodium 10 MG Oral Tab Take 10 mg by mouth nightly.  Disp SpO2 97%      REVIEW OF SYSTEMS:  GENERAL HEALTH:---sad, her brother passed away two weeks ago  SKIN: denies any unusual skin lesions or rashes  WOUNDS: none   EYES:---+wears glasses  HENT: denies nasal congestion, sinus pain or sore throat; and hearing l reflexes normal, cranial nerves intact II-XII, follows commands,+slight tremors present  PSYCHIATRIC: alert and oriented x 3; affect appropriate    DIAGNOSTICS REVIEWED AT THIS VISIT:  Lab Results   Component Value Date     (H) 04/08/2019    BUN 26 Allergies  -Flonase 1 sp in ea nostril  -Singular 10 mg qhs    DMT2-A1c 6.3  -ACHS, notify MD/NP if blood sugar is <60 or >350  -Amaryl 4 mg qd    CKD  -Monitor Labs    PVD with h/o LE revascularization  -Monitor    Chronic Knee Infection  -Penicillin v po

## 2019-04-08 NOTE — DISCHARGE SUMMARY
[facility] HOSPITALIST  DISCHARGE SUMMARY     Rickey Welsh Patient Status:  Inpatient    1929 MRN [id number]   [facility] 2NE-A Attending No att. providers found   Hosp Day # 5 PCP Sebas Richard MD     Date of Admission: 2019  [facility].    Procedures during hospitalization:   • none    Incidental or significant findings and recommendations (brief descriptions):  • none    Lab/Test results pending at Discharge:   · none    Consultants:  • -renal -g cards    Dis mouth once daily.    Quantity:  90 tablet  Refills:  1     Esomeprazole Magnesium 40 MG Cpdr  Commonly known as:  NEXIUM      Take 1 capsule (40 mg total) by mouth every morning before breakfast. takes daily   Quantity:  90 capsule  Refills:  1     [facility] Phone:  [phone]   · lisinopril 10 MG Tabs     Please  your prescriptions at the location directed by your doctor or nurse    Bring a paper prescription for each of these medications  · acetaminophen 325 MG Tabs  · HYDROcodone-acetaminophen 1 brace on right ankle  Extremities: trace+ LE edema  -----------------------------------------------------------------------------------------------  PATIENT DISCHARGE INSTRUCTIONS: See electronic chart    Kadie King MD    Time spent:  > 30 minutes

## 2019-04-10 PROBLEM — J18.9 COMMUNITY ACQUIRED PNEUMONIA: Status: ACTIVE | Noted: 2019-01-01

## 2019-04-10 NOTE — PROGRESS NOTES
[place] Author: Enedina Alvarado MD     1929 MRN [id number]   [facility]  Admission 19      Last Hospital Discharge 19 PCP Cora Cartagena MD   Hospital of Discharge 19       Date of Admission:  sulfa antibiotics; and atorvastatin. Current Meds:    Current Outpatient Medications on File Prior to Visit:  Cholecalciferol (VITAMIN D) 1000 units Oral Tab Take 1,000 mg by mouth daily.    traMADol HCl 50 MG Oral Tab Take 1-2 tablets ( mg total) potassium 250 MG Oral Tab Take 250 mg by mouth daily. SSD 1 % Apply Externally Cream Apply to rash as needed   aspirin 81 MG Oral Tab EC Take 81 mg by mouth daily. Calcium-Vitamin D (CALTRATE 600 PLUS-VIT D OR) Take 1 tablet by mouth daily.        No injection, Scarlet fever (9/16/2010), Seizure disorder ([facility] Seizures ([facility] Stroke ([facility] (4/8/2014), Type II or unspecified type diabetes mellitus without mention of complication, not stated as uncontrolled, Unspecified essential hypertension (6/29/2012) is 25.82 kg/m² as calculated from the following:    Height as of 4/2/19: 69\". Weight as of 4/7/19: 174 lb 13.2 oz. Physical Exam   Constitutional: She is oriented to person, place, and time. She appears well-developed and well-nourished.  No distres INDICATIONS:  R09.89 Other specified symptoms and signs involving the circulatory and respiratory systems T17.908A Unspecified foreign body in respiratory tract, part unspec*  COMPARISON:  [facility] , XR CHEST AP PORTABLE  (CPT=71045), 3/22/2018, 18:25.   EDWA with pill with decline in resp status  PATIENT STATED HISTORY: (As transcribed by Technologist)  Choking episode with pill with decline in respiratory status.    CINE CAPTURES:  3 FLUORSCOPY TIME:  37seconds RADIATION DOSE (AIR KERMA PRODUCT):  32.8 uGy/m2 Time: 04/08/19  9:34 AM   Result Value Ref Range    WBC 9.0 4.0 - 11.0 x10(3) uL    RBC 3.51 (L) 3.80 - 5.30 x10(6)uL    HGB 10.5 (L) 12.0 - 16.0 g/dL    HCT 33.2 (L) 35.0 - 48.0 %    .0 150.0 - 450.0 10(3)uL    MCV 94.6 80.0 - 100.0 fL    MCH 29.9 CKD (GFR 30-59) ([facility])  -stable, CPm    14. Hypoxemia  -02 per protocol    15. Hyponatremia  -continue with Na tablets. -stable, watching labs.      16. Closed fracture of right ankle with routine healing, subsequent encounter  -continue with right ankle b

## 2019-04-11 NOTE — PROGRESS NOTES
Jaziel Fall, 11/20/1929, 80year old, female    Chief Complaint:  Patient presents with:   Follow - Up: hypoxemia, LE edema, CHF exacerbation with weakness, right ankle fx  Lab Results  Weakness  Pain       Subjective:   79 y/o female who lives at home well nourished, in no apparent distress  LINES, TUBES, DRAINS:  none  SKIN: pale, warm, dry  WOUND: none  EYES: PERRLA, EOMI, sclera anicteric, conjunctiva normal;no drainage from eyes  HENT: normocephalic; normal nose, no nasal drainage, mucous membranes 04/11/2019    K 4.2 04/11/2019    CL 93 (L) 04/11/2019    CO2 31.0 04/11/2019       Assessment and plan:    Generalized Weakness/OA/Right lateral Malleolar Fracture  -PT/OT to evaluate and treat  -Tylenol 650 mg q6h prn for fever/pain, if given for fever, [phone]  -Dr. Ely Leventhal.  Fabio Cm, Cardiology on 5/1/19 [phone]  - [facility] Dr Girard Semen 4/15/19       [employer], EMMANUEL  4/11/2019

## 2019-04-16 NOTE — PROGRESS NOTES
Rhondaghazal Castellon, 11/20/1929, 80year old, female    Chief Complaint:  Patient presents with:   Follow - Up: hypoxemia, fluid overload, cough, weakness, right ankle fx  Ankle Pain  Weakness       Subjective:   81 y/o female who lives at home with her daught DRAINS:  none  SKIN: pale, warm, dry  WOUND: none  EYES: PERRLA, EOMI, sclera anicteric, conjunctiva normal;no drainage from eyes  HENT: normocephalic; normal nose, no nasal drainage, mucous membranes pink, moist.  NECK: supple; FROM  BREAST: ---deferred 04/16/2019    CO2 32.0 04/16/2019       Assessment and plan:    Generalized Weakness/OA/Right lateral Malleolar Fracture  -PT/OT to evaluate and treat  -Tylenol 650 mg q6h prn for fever/pain, if given for fever, notify MARIANNA Darden 10/325 mg (1) tab q4h pr 4/18  -Weekly CBC and CMP      Follow Up Appointments  -Dr. Shoshana Farmer, PCP in 7 to 10 days after discharge from [address], APN, Cardiology, on 4/19/19 [phone]  -Dr. Anais Branham.  Agustín Davis, Cardiology on 5/1/19 [phone]  - Ortho

## 2019-04-18 PROBLEM — I50.42 CHRONIC COMBINED SYSTOLIC (CONGESTIVE) AND DIASTOLIC (CONGESTIVE) HEART FAILURE (HCC): Status: ACTIVE | Noted: 2018-06-04

## 2019-04-18 PROBLEM — I34.0 NON-RHEUMATIC MITRAL REGURGITATION: Status: ACTIVE | Noted: 2019-01-01

## 2019-04-18 PROBLEM — I27.20 PULMONARY HTN (HCC): Status: ACTIVE | Noted: 2019-01-01

## 2019-04-19 NOTE — PROGRESS NOTES
Derian Mclaughlin, 11/20/1929, 80year old, female    Chief Complaint:  Patient presents with:   Follow - Up: hypoxemia, edema, cough, Right ankle fracture  Pain  Lab Results  Weakness       Subjective:   79 y/o female who lives at home with her daughter wit removed, dressing to top of foot, others closed, reddness to heel and lateral foot noted  EYES: PERRLA, EOMI, sclera anicteric, conjunctiva normal;no drainage from eyes  HENT: normocephalic; normal nose, no nasal drainage, mucous membranes pink, moist.  NE K 4.9 04/18/2019    CL 95 (L) 04/18/2019    CO2 32.0 04/18/2019       Assessment and plan:    Generalized Weakness/OA/Right lateral Malleolar Fracture  -PT/OT to evaluate and treat  -Tylenol 650 mg q6h prn for fever/pain, if given for fever, notify M.D.  - 600/Vitamin D 400 mg qd     Labs  -Weekly CBC and CMP      Follow Up Appointments  -Dr. Cori Galeazzi, PCP in 7 to 10 days after discharge from [address], Cardiology, on 4/19/19 [phone]  -Dr. Lincoln Ybarra.  Harinder, Cardiology on

## 2019-04-19 NOTE — TELEPHONE ENCOUNTER
Patient's daughter called to say she will not be picking up the prescription that was sent for levothyroxine 100mcg because that refill request was sent by mistake.  She is no longer taking that dosage amount

## 2019-04-19 NOTE — TELEPHONE ENCOUNTER
Confirmed with [place] dose is 125mcg. Correct dose is 125mcg. Med list was updated and 100mcg was removed.

## 2019-04-23 NOTE — PROGRESS NOTES
Pilar Willett, 11/20/1929, 80year old, female    Chief Complaint:  Patient presents with:   Follow - Up: hypoxemia, increased edema, weakness, cough, right ankle fx  Lab Results  Weakness  Pain       Subjective:   81 y/o female who lives at home with he heel and lateral foot noted  EYES: PERRLA, EOMI, sclera anicteric, conjunctiva normal;no drainage from eyes  HENT: normocephalic; normal nose, no nasal drainage, mucous membranes pink, moist.  NECK: supple; FROM  BREAST: ---deferred  RESPIRATORY:---diminis Assessment and plan:    Generalized Weakness/OA/Right lateral Malleolar Fracture  -PT/OT to evaluate and treat  -Tylenol 650 mg q6h prn for fever/pain, if given for fever, notify M.KRISTIAN  Rena Ora 10/325 mg (1) tab q4h prn  -Fall precautions  -Foot Stabil 400 mg qd     Labs  -Weekly CBC and CMP   - BMP Thursday to check Na     Follow Up Appointments  -Dr. Austin Andrade, PCP in 7 to 10 days after discharge from [address], [facility], Cardiology, 5/10/19  [phone]  -Dr. Tru Tolliver.  Lorenzo Camilo

## 2019-05-01 PROBLEM — N39.0 URINARY TRACT INFECTION WITHOUT HEMATURIA: Status: ACTIVE | Noted: 2019-01-01

## 2019-05-01 PROBLEM — E86.0 DEHYDRATION: Status: ACTIVE | Noted: 2019-01-01

## 2019-05-01 PROBLEM — I50.22 SYSTOLIC CHF, CHRONIC (HCC): Status: ACTIVE | Noted: 2018-06-04

## 2019-05-01 PROBLEM — N17.9 ACUTE RENAL FAILURE SUPERIMPOSED ON CHRONIC KIDNEY DISEASE, UNSPECIFIED CKD STAGE, UNSPECIFIED ACUTE RENAL FAILURE TYPE (HCC): Status: ACTIVE | Noted: 2019-01-01

## 2019-05-01 PROBLEM — N17.9 ACUTE RENAL FAILURE SUPERIMPOSED ON CHRONIC KIDNEY DISEASE, UNSPECIFIED CKD STAGE, UNSPECIFIED ACUTE RENAL FAILURE TYPE: Status: ACTIVE | Noted: 2019-01-01

## 2019-05-01 PROBLEM — S82.832A CLOSED FRACTURE OF DISTAL END OF LEFT FIBULA, UNSPECIFIED FRACTURE MORPHOLOGY, INITIAL ENCOUNTER: Status: ACTIVE | Noted: 2019-01-01

## 2019-05-01 PROBLEM — N18.9 ACUTE RENAL FAILURE SUPERIMPOSED ON CHRONIC KIDNEY DISEASE, UNSPECIFIED CKD STAGE, UNSPECIFIED ACUTE RENAL FAILURE TYPE (HCC): Status: ACTIVE | Noted: 2019-01-01

## 2019-05-01 PROBLEM — E87.5 HYPERKALEMIA: Status: ACTIVE | Noted: 2019-01-01

## 2019-05-01 PROBLEM — N39.0 URINARY TRACT INFECTION WITHOUT HEMATURIA, SITE UNSPECIFIED: Status: ACTIVE | Noted: 2019-01-01

## 2019-05-01 PROBLEM — M10.372 ACUTE GOUT DUE TO RENAL IMPAIRMENT INVOLVING LEFT ANKLE: Status: ACTIVE | Noted: 2019-01-01

## 2019-05-01 PROBLEM — N18.9 ACUTE RENAL FAILURE SUPERIMPOSED ON CHRONIC KIDNEY DISEASE, UNSPECIFIED CKD STAGE, UNSPECIFIED ACUTE RENAL FAILURE TYPE: Status: ACTIVE | Noted: 2019-01-01

## 2019-05-01 PROBLEM — D72.829 LEUKOCYTOSIS, UNSPECIFIED TYPE: Status: ACTIVE | Noted: 2019-01-01

## 2019-05-01 NOTE — TELEPHONE ENCOUNTER
Adela Bashir from [facility] called and stated patient has diarrhea- family reported this, and last 3 days her fasting blood sugars were 91, 64, 46 respectively. Moreover, she threw up this morning, but her blood pressure was fine.  RN wants her to stay on glimepi

## 2019-05-01 NOTE — TELEPHONE ENCOUNTER
Spoke with Unknown Repine and relayed reported symptoms. Unknown Repine reviewed chart and recommends patient go to the ED for further evaluation.    Her potassium was 5.5 on 4/30/19, she has elevated WBC, elevated uric acid, abnormal renal function, hypoglycemia and hypotensi

## 2019-05-01 NOTE — ED PROVIDER NOTES
Patient Seen in: [facility] Emergency Department    History   Patient presents with:  Abnormal Result (metabolic, cardiac)    Stated Complaint: ABNORMAL LABS    HPI    70-year-old female presents to the emerge department after having \"abnormal labs • High blood pressure    • High cholesterol    • Hypercholesterolemia 4/8/2014   • LBBB (left bundle branch block) 4/8/2014   • Leg swelling 9/15/2011    lower leg localized swelling   • Measles 9/16/2010   • Mitral valve disorders(424.0) 6/29/2012   • Mus • CATH DRUG ELUTING STENT     • CATH PERCUTANEOUS  TRANSLUMINAL CORONARY ANGIOPLASTY     • COLONOSCOPY N/A 7/15/2014    Performed by Fatimah Cook MD at [facility] ENDOSCOPY   • COLONOSCOPY N/A 7/14/2014    Performed by Gary Vincent MD at [facility] ENDOSCOPY Constitutional: She is oriented to person, place, and time. She appears well-developed and well-nourished. HENT:   Head: Normocephalic and atraumatic. Dry mucous membranes   Eyes: Pupils are equal, round, and reactive to light.  EOM are normal.   Neck: C-Reactive Protein 1.70 (*)     All other components within normal limits   PROCALCITONIN - Abnormal; Notable for the following components:    Procalcitonin 0.22 (*)     All other components within normal limits    Narrative:     Resulted by: batch: CO2, PROCEDURE:  XR ANKLE (MIN 3 VIEWS), LEFT (CPT=73610)  TECHNIQUE:  Three views were obtained. COMPARISON:  None.   INDICATIONS:  ABNORMAL LABS  PATIENT STATED HISTORY: (As transcribed by Technologist)  Patient has pain over the left ankle which increases wi Patient had IV established and blood work obtained. She is placed on a cardiac monitor. Patient was very minimally hyperkalemic and this was felt to be slightly hemolyzed.   Therefore is not felt that emergent intervention was needed and that with gentle Current Discharge Medication List        Present on Admission  Date Reviewed: 4/2/2019          ICD-10-CM Noted POA    Urinary tract infection without hematuria N39.0 5/1/2019 Unknown

## 2019-05-01 NOTE — H&P
[facility] HOSPITALIST  History and Physical     Anastasiia Molina Patient Status:  Emergency    1929 MRN [id number]   Location [facility] [facility] Attending Baltazar Nunez MD   Hosp Day # 0 PCP Madie López MD     Chief Complaint: mellitus ([facility] 9/26/2013   • Disorder of thyroid    • Esophageal reflux 6/29/2012   • Gout 9/16/2010   • Gout    • H/O head injury     major   • Hearing impairment    • Heart attack ([facility]    • High blood pressure    • High cholesterol    • Hypercholesterol ANGIOPLASTY (CORONARY)      2/11/2011   • 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  Disp: 90 tablet Rfl: 1   Esomeprazole Magnesium (NEXIUM) 40 MG Oral Capsule Delayed Release Take 1 capsule (40 mg total) by mouth every morning before breakfast. takes daily Disp: 90 capsule Rfl: 1   nystatin 24754 B/l ankles TTP   Integument: No lesions. Psychiatric: Appropriate mood and affect.       Diagnostic Data:      Labs:  Recent Labs   Lab 04/25/19  0924 04/30/19  1503 05/01/19  1332   WBC 10.3 11.8* 18.3*   HGB 11.0* 11.3* 11.4*   MCV 94.7 96.4 94.6   PLT Will have renal eval.        Quality:  · DVT Prophylaxis: heparin  · CODE status: DNR  · Diane: no    Plan of care discussed with ED physician    Fabio Lynn MD  5/1/2019

## 2019-05-02 NOTE — PROGRESS NOTES
[facility] HOSPITALIST  Progress Note     Reena Shelter Patient Status:  Inpatient    1929 MRN [id number]   [facility] 2NE-A Attending Dotty Lanier MD   Hosp Day # 1 PCP Molly Cheadle, MD     Chief Complaint: ankle pain, renal failur Clopidogrel Bisulfate  75 mg Oral Daily   • Levothyroxine Sodium  125 mcg Oral Before breakfast   • Pantoprazole Sodium  40 mg Oral QAM AC   • Heparin Sodium (Porcine)  5,000 Units Subcutaneous Q8H [address]   • cefTRIAXone  1 g Intravenous Q24H       ASSESSMENT documentation in H+P. Based on patients current state of illness, I anticipate that, after discharge, patient will require TBD.

## 2019-05-02 NOTE — PHYSICAL THERAPY NOTE
PHYSICAL THERAPY EVALUATION - INPATIENT     Room Number: 4258/7463-M  Evaluation Date: 5/2/2019  Type of Evaluation: Initial  Physician Order: PT Eval and Treat    Presenting Problem: left ankle pain with distal fibula fracture  Reason for Therapy: M ([facility] 6/29/2012   • Back pain 4/8/2014    Lumbar    • Cancer ([facility]     skin cancer   • Carotid stenosis 4/8/2014   • Carpal tunnel syndrome 6/20/2013    Log Date: 01/17/2012    • Cataract     left -- 2008 and right -- 2009   • Cellulitis and abscess of leg diabetes mellitus without mention of complication, not stated as uncontrolled    • Unspecified essential hypertension 6/29/2012   • Unspecified hypothyroidism 6/29/2012   • Unspecified urinary incontinence 6/29/2012   • Unspecified venous (peripheral) insu rt       HOME SITUATION  Type of Home: House   Home Layout: One level  Stairs to Enter : 1  Railing: No  Stairs to Bedroom: 0       Lives With: Daughter(provides 24 hour supervision)  Drives: No  Patient Owned Equipment: Rolling walker; Other (Comment)(whee Unable   -   Moving from lying on back to sitting on the side of the bed?: A Lot   How much help from another person does the patient currently need. ..   -   Moving to and from a bed to a chair (including a wheelchair)?: Total   -   Need to walk in hospita anxiety, right lateral malleolar fracture.   In this PT evaluation, the patient presents with the following impairments decreased activity tolerance, decreased functional strength, decreased balance, impaired safety awareness with rolling walker, acute pain

## 2019-05-02 NOTE — PROGRESS NOTES
NURSING ADMISSION NOTE      Patient admitted via cart from ER, accompanied per staff and Pt daughter. Pt bld sugar=50, oral glucose given, will follow protocol, IVF @ 83cc/hr initiated. Cpm.  Oriented to room. Safety precautions initiated.   Bed in low

## 2019-05-02 NOTE — PHYSICAL THERAPY NOTE
Orders received for PT evaluation via ADL screening functional mobility score. Chart reviewed. Pt with \"Oblique fracture involving the distal fibular metadiaphysis\" per ankle x-ray. Orthopedic surgery consult pending.  Will hold PT at this time and follow

## 2019-05-02 NOTE — PLAN OF CARE
Received patient at 0730. Alert and Oriented x2-3, needs frequent reorientation. Tele Rhythm SR with BBB. O2 saturation 96% On room air. Breath sounds clear. Bed is locked and in low position. Call light and personal items within reach.  Pain to BLE better evaluate response  - Consider cultural and social influences on pain and pain management  - Manage/alleviate anxiety  - Utilize distraction and/or relaxation techniques  - Monitor for opioid side effects  - Notify MD/LIP if interventions unsuccessful or pa complications  Description  Interventions:  - Assess wound bed/incision and surrounding skin tissue  - Collaborate with physician/APN and implement wound/incision site care and dressing changes as ordered  - Position infant to avoid placing pressure on wou

## 2019-05-02 NOTE — BH PROGRESS NOTE
Behavioral Health Note  CHIEF COMPLAINT  Abnormal labs  REASON FOR ADMISSION  Abnormal labs    SOCIAL HISTORY  Arch Bowels lives with her daughter. PAST PSYCHIATRIC HISTORY  Daughter denies any mental health history, but per chart she has a h/o anxiety.     Maria Del Carmen Casarez

## 2019-05-02 NOTE — CONSULTS
[facility]  Report of Consultation    Karthikeyan Zavala Patient Status:  Inpatient    1929 MRN [id number]   [facility] 2NE-A Attending Alexander Slater MD   Hosp Day # 1 PCP Nadine Alfaro MD     Reason for Consultation:  GLEN/hyperk 9/26/2013   • Disorder of thyroid    • Esophageal reflux 6/29/2012   • Foot fracture 04/2019    right and left   • Gout 9/16/2010   • Gout    • H/O head injury     major   • Hearing impairment    • Heart attack ([facility]    • High blood pressure    • High chol ANGIOPLASTY (CORONARY)      2/11/2011   • 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     Allergies:    Adhesive Tape           RASH  Amlodipine              SWELLING  Latex                   RASH  Sulfa Antibiotics       HIVES  Atorvastatin            OTHER (SEE COMMENTS)    Comment:Muscle spasms    Medications:    Current Facility-A mg, 2 mg, Intravenous, Q2H PRN **OR** morphINE sulfate (PF) 4 MG/ML injection 4 mg, 4 mg, Intravenous, Q2H PRN  •  CefTRIAXone Sodium (ROCEPHIN) 1 g in sodium chloride 0.9% 100 mL MBP/ADD-vantage, 1 g, Intravenous, Q24H    No current outpatient medications 05/02/2019    ALB 2.8 05/01/2019    ALKPHO 103 05/01/2019    BILT 0.4 05/01/2019    TP 7.7 05/01/2019    AST 35 05/01/2019    ALT 20 05/01/2019    CRP 1.70 05/01/2019    MG 2.4 05/02/2019    PGLU 162 05/02/2019       Glucose (mg/dL)   Date Value   02/21/20 kidney injury which I suspect is due to prerenal azotemia from nausea/vomiting and diarrhea. Her creatinine is significant better today with intravenous fluids and we will discontinue given her history of CHF.   The renal function will continue to be monit

## 2019-05-02 NOTE — PLAN OF CARE
Patient is A&Ox2, stated to be baseline per daughter. Arrived to floor before shift change, accu-check 50. Medication given by day shift RN to correct. Meal eaten. Recheck 91, 150, 105. Medication taken on pill at a time with water.  Intact blister on right

## 2019-05-02 NOTE — CONSULTS
[facility]    Report of Consultation    Paulie Gutiérrez Patient Status:  Inpatient    1929 MRN [id number]   [facility] 2NE-A Attending Pernell Castleman, MD   [facility] Day # 1 PCP Lyubov Campbell MD     Date of Admission:  2019  Da • Hypercholesterolemia 4/8/2014   • LBBB (left bundle branch block) 4/8/2014   • Leg swelling 9/15/2011    lower leg localized swelling   • Measles 9/16/2010   • Mitral valve disorders(424.0) 6/29/2012   • Muscle weakness    • Neuropathy    • OA (osteoa CATH PERCUTANEOUS  TRANSLUMINAL CORONARY ANGIOPLASTY     • COLONOSCOPY N/A 7/15/2014    Performed by Amparo Coleman MD at [facility] ENDOSCOPY   • COLONOSCOPY N/A 7/14/2014    Performed by Jeannine Hernandez MD at [facility] ENDOSCOPY   • ESOPHAGOGASTRODUODENOSCOPY nasal spray 1 spray 1 spray Each Nare Daily   Metoprolol Succinate ER (Toprol XL) 24 hr tab 100 mg 100 mg Oral Daily Beta Blocker   Clopidogrel Bisulfate (PLAVIX) tab 75 mg 75 mg Oral Daily   Levothyroxine Sodium (SYNTHROID, LEVOTHROID) tab 125 mcg 125 mcg HYDROcodone-acetaminophen  MG Oral Tab Take 1 tablet by mouth every 4 (four) hours as needed for Pain. Metoprolol Succinate  MG Oral Tablet 24 Hr Take 100 mg by mouth daily. Montelukast Sodium 10 MG Oral Tab Take 10 mg by mouth nightly. 10\" (1.778 m), weight 159 lb 2.8 oz (72.2 kg), SpO2 98 %, not currently breastfeeding. RLE - multiple areas of superficial skin breakdown. TTP over lateral ankle. LLE - mild swelling noted. Skin intact. ttp over medial and lateral ankle.   Df/pf intac Diffuse interstitial abnormalities in the lungs are improved when compared to prior exam.    Dictated by: Desiree Valdovinos MD on 5/01/2019 at 15:35     Approved by: Desiree Valdovinos MD                 Impression:     Urinary tract infection without he

## 2019-05-03 NOTE — PROGRESS NOTES
[facility] HOSPITALIST  Progress Note     Cinthia Mccoy Patient Status:  Inpatient    1929 MRN [id number]   [facility] 2NE-A Attending Brant Joseph MD   Hosp Day # 2 PCP Joel Hope MD     Chief Complaint: ankle pain, renal failur Bisulfate  75 mg Oral Daily   • Levothyroxine Sodium  125 mcg Oral Before breakfast   • Pantoprazole Sodium  40 mg Oral QAM AC   • Heparin Sodium (Porcine)  5,000 Units Subcutaneous Q8H [facility]   • cefTRIAXone  1 g Intravenous Q24H       ASSESSMENT / PLAN:

## 2019-05-03 NOTE — CM/SW NOTE
radha notified that son Ry Ortega has identified concerns regarding DC today. radha spoke to Dr Derick Ron and then contacted son. Dr Derick Ron has addressed concerns with the family at bedside: dtr and granddtr.      sw addressed all of sons concerns and there are no fur

## 2019-05-03 NOTE — PHYSICAL THERAPY NOTE
PHYSICAL THERAPY TREATMENT NOTE - INPATIENT    Room Number: 4294/0668-I     Session: 1   Number of Visits to Meet Established Goals: 5    Presenting Problem: left ankle pain with distal fibula fracture     History related to current admission: Pt is 89 ye stenosis 4/8/2014   • Carpal tunnel syndrome 6/20/2013    Log Date: 01/17/2012    • Cataract     left -- 2008 and right -- 2009   • Cellulitis and abscess of leg, except foot 6/29/2012   • Chronic sinusitis    • Congestive heart disease ([facility] [address]    • Coronary essential hypertension 6/29/2012   • Unspecified hypothyroidism 6/29/2012   • Unspecified urinary incontinence 6/29/2012   • Unspecified venous (peripheral) insufficiency 6/29/2012   • Visual impairment        Past Surgical History  Past Surgical History: stated. OBJECTIVE  Precautions:  Other (Comment)(CAM boot for comfort to left ankle as tolerated)    WEIGHT BEARING RESTRICTION  Weight Bearing Restriction: L lower extremity;R lower extremity        R Lower Extremity: Weight Bearing as Tolerated  MINAL Saldana elevated. Mod assist of 2 up to r/w, pt with increased pain in standing and refuses to remains standing. Stand to sit with mod assist. Completes squat pivot transfer to chair with mod assist of 1 and min assist of 1.  Recommend total lift transfer back to b

## 2019-05-03 NOTE — PLAN OF CARE
Pt discharged to the [facility] via ambulance. Discharge instructions given to pt's daughter with her verbalizing understanding.

## 2019-05-03 NOTE — CM/SW NOTE
HUGO order for HHC/family requesting palliative care. PT recommending TIM. HUGO called pt's daughter Martha Fernandez to discuss d/c plan. Pt was recently d/c'd from [facility] and she would like for pt to return there.  Dtr also confirmed that she would like palliat

## 2019-05-03 NOTE — PLAN OF CARE
Patient alert and oriented, forgetful, hard of hearing; VSS; cardiac monitor showing SR with a BBB; lung sounds clear, on room air, no cough noted; edema to BLE; sacrum reddened, barrier cream applied; right heel with blister, dressing changed; incontinent

## 2019-05-03 NOTE — PLAN OF CARE
Patient is A&Ox2, stated to be baseline per daughter. Intact blister on right heel, dressing c/d/I. Mepilex placed. Sacrum and buttock is reddened. Barrier cream placed. Skin is still c/d/I. Patient briefed, repositioned, and turned frequently.  Norco given ADULT  Goal: Absence of cardiac arrhythmias or at baseline  Description  INTERVENTIONS:  - Continuous cardiac monitoring, monitor vital signs, obtain 12 lead EKG if indicated  - Evaluate effectiveness of antiarrhythmic and heart rate control medications as

## 2019-05-03 NOTE — OCCUPATIONAL THERAPY NOTE
OCCUPATIONAL THERAPY EVALUATION - INPATIENT     Room Number: 5029/1623-F  Evaluation Date: 5/3/2019  Type of Evaluation: Initial  Presenting Problem: UTI, L fibula fracture    Physician Order: IP Consult to Occupational Therapy  Reason for Therapy: ADL/IAD 6/29/2012   • Back pain 4/8/2014    Lumbar    • Cancer (HCC)     skin cancer   • Carotid stenosis 4/8/2014   • Carpal tunnel syndrome 6/20/2013    Log Date: 01/17/2012    • Cataract     left -- 2008 and right -- 2009   • Cellulitis and abscess of leg, exce mellitus without mention of complication, not stated as uncontrolled    • Unspecified essential hypertension 6/29/2012   • Unspecified hypothyroidism 6/29/2012   • Unspecified urinary incontinence 6/29/2012   • Unspecified venous (peripheral) insufficiency OCCUPATIONAL PROFILE    HOME SITUATION  Type of Home: House  Home Layout: One level  Lives With: Daughter(provides 24 hour supervision)    Toilet and Equipment: Toilet riser with arms  Shower/Tub and Equipment: Walk-in shower; Shower chair;Grab bar  O range    Upper extremity strength is within functional limits except for the following;  B shoulders 2-/5, B elbows 4/5    COORDINATION  Gross Motor    WFL    Fine Motor    WFL      ADDITIONAL TESTS                                    NEUROLOGICAL FINDINGS is recommended for 14-17 days. After this period of rehabilitation patient should achieve min (A) level in transfers & UB ADL and mod (A) level in LB ADL. Torin Cobb    . In this OT evaluation patient presents with the following performance deficits: pain manageme transfer from sit to stand:  with min assist  Patient will transfer to bedside commode:  with min assist    UE Exercise Program Goal  Patient will be supervision with bilateral AROM HEP (home exercise program).

## 2019-05-03 NOTE — PROGRESS NOTES
[facility]  Nephrology Progress Note    Ether Mediate Patient Status:  Inpatient    1929 MRN [id number]   [facility] 2NE-A Attending Jeannette Cheney MD   Hosp Day # 2 PCP Newton Whitmore MD       SUBJECTIVE:  Up in chair, c/o an Recent Labs   Lab 05/02/19  1035 05/02/19  1231 05/02/19  1709 05/02/19  2111 05/03/19  0806   PGLU 162* 96 211* 207* 105*       Meds:     Current Facility-Administered Medications:  aspirin EC tab 81 mg 81 mg Oral Daily   Montelukast Sodium (DEVON Impression/Plan:      #1. Acute kidney injury/chronic kidney disease stage III-she had fluctuating renal function in the setting of congestive heart failure and the need for diuretics.   She presented with acute kidney injury which I suspect is due

## 2019-05-06 NOTE — DISCHARGE SUMMARY
[facility] [place] HOSPITALIST  DISCHARGE SUMMARY     Paulie Gutiérerz Patient Status:  Inpatient    1929 MRN [id number]   [facility] 2NE-A Attending No att. providers found   Hosp Day # 2 PCP Lyubov Campbell MD     Date of Admission: 2019  Michele as tolerated status. She was also diagnosed with a urinary tract infection and was started on IV antibiotics. She was eventually stable for discharge. Due to her deconditioned state she did require discharge to subacute rehabilitation.   Since she was re Tbec  Commonly known as:  DULCOLAX      Take 1 tablet (5 mg total) by mouth daily. While on narcotics   Quantity:  20 tablet  Refills:  0     CALTRATE 600 PLUS-VIT D OR      Take 1 tablet by mouth daily.    Refills:  0     cephALEXin 500 MG Caps  Commonly k known as:  Arbie Cart  Replaced by:  Pantoprazole Sodium 40 MG Tbec        glimepiride 4 MG Tabs  Commonly known as:  AMARYL        HYDROcodone-acetaminophen  MG Tabs  Commonly known as:  NORCO  Replaced by:  HYDROcodone-acetaminophen 5-325 MG Tabs

## 2019-05-06 NOTE — TELEPHONE ENCOUNTER
The pt was d/c'd to [facility] on 5/3/19 and has a TCC appt scheduled for 5/15/19. Please cancel TCC appt as pt is still admitted to SNF and notify pt or family appt has been cancelled. Thank you.

## 2019-05-06 NOTE — PROGRESS NOTES
Patient discharged to [facility] on 5/3/19.

## 2019-05-07 NOTE — PROGRESS NOTES
Harley Cui  : 1929  Age 80year old  female patient is admitted to Facility: The [address] [place] [address] date:  19  Discharge date to [facility]:  5/3/2019   ELOS:   14 days  Uche heart disease ([facility]    • Coronary atherosclerosis of unspecified type of vessel, native or graft 6/29/2012   • Cough due to ACE inhibitor 4/8/2014   • CVA (cerebral infarction) 4/8/2014   • Diabetes mellitus ([facility] 9/26/2013   • Disorder of thyroid    • Eso History:   Procedure Laterality Date   • ACTIVE WOUND CARE/20 CM OR <  9/27/2011    diabetic ulcer L great toe   • ANGIOGRAM      2004   • ANGIOGRAM      11/6/2007   • ANGIOPLASTY (CORONARY)      3/19/2009   • ANGIOPLASTY (CORONARY)      2/11/2011   • CABG (ulcerative colitis) Other         child   • Stroke Sister      Social History    Tobacco Use      Smoking status: Never Smoker      Smokeless tobacco: Never Used    Alcohol use: No    Drug use: No      ALLERGIES:    Adhesive Tape           RASH  Amlodipin Oral Tab Take 1 tablet (125 mcg total) by mouth before breakfast. Repeat thyroid labs in 6 weeks Disp: 60 tablet Rfl: 0   Clopidogrel Bisulfate 75 MG Oral Tab Take 1 tablet (75 mg total) by mouth once daily.  Disp: 90 tablet Rfl: 1   penicillin v potassium anicteric, conjunctiva normal; there is no nystagmus, no drainage from eyes  HENT: normocephalic; normal nose, no nasal drainage, mucous membranes pink, moist.  NECK: supple, non tender, FROM  BREAST: deferred  RESPIRATORY:clear, no crackles, no wheezing, Component Value Date    VITD 42.1 05/06/2019       [facility] records, notes, lab and imaging results reviewed. Medication reconciliation completed.         SEE PLAN BELOW    GLEN on CKD III w hyperkalemia, 2/2 prerenal azotemia with NVD on admit  - s medical records, labs, completing medication reconciliation and entering orders to establish plan of care in [facility].     Liz Tamez, APRN  5/6/2019

## 2019-05-09 PROBLEM — N39.0 URINARY TRACT INFECTION WITHOUT HEMATURIA: Status: RESOLVED | Noted: 2019-01-01 | Resolved: 2019-01-01

## 2019-05-09 PROBLEM — N18.9 ACUTE RENAL FAILURE SUPERIMPOSED ON CHRONIC KIDNEY DISEASE, UNSPECIFIED CKD STAGE, UNSPECIFIED ACUTE RENAL FAILURE TYPE: Status: RESOLVED | Noted: 2019-01-01 | Resolved: 2019-01-01

## 2019-05-09 PROBLEM — E87.1 HYPONATREMIA: Status: RESOLVED | Noted: 2019-01-01 | Resolved: 2019-01-01

## 2019-05-09 PROBLEM — J18.9 COMMUNITY ACQUIRED PNEUMONIA: Status: RESOLVED | Noted: 2019-01-01 | Resolved: 2019-01-01

## 2019-05-09 PROBLEM — N17.9 ACUTE RENAL FAILURE SUPERIMPOSED ON CHRONIC KIDNEY DISEASE, UNSPECIFIED CKD STAGE, UNSPECIFIED ACUTE RENAL FAILURE TYPE (HCC): Status: RESOLVED | Noted: 2019-01-01 | Resolved: 2019-01-01

## 2019-05-09 PROBLEM — E86.0 DEHYDRATION: Status: RESOLVED | Noted: 2019-01-01 | Resolved: 2019-01-01

## 2019-05-09 PROBLEM — R09.02 HYPOXEMIA: Status: RESOLVED | Noted: 2019-01-01 | Resolved: 2019-01-01

## 2019-05-09 PROBLEM — D72.829 LEUKOCYTOSIS, UNSPECIFIED TYPE: Status: RESOLVED | Noted: 2019-01-01 | Resolved: 2019-01-01

## 2019-05-09 PROBLEM — N39.0 URINARY TRACT INFECTION WITHOUT HEMATURIA, SITE UNSPECIFIED: Status: RESOLVED | Noted: 2019-01-01 | Resolved: 2019-01-01

## 2019-05-09 PROBLEM — E87.5 HYPERKALEMIA: Status: RESOLVED | Noted: 2019-01-01 | Resolved: 2019-01-01

## 2019-05-09 PROBLEM — N18.9 ACUTE RENAL FAILURE SUPERIMPOSED ON CHRONIC KIDNEY DISEASE, UNSPECIFIED CKD STAGE, UNSPECIFIED ACUTE RENAL FAILURE TYPE (HCC): Status: RESOLVED | Noted: 2019-01-01 | Resolved: 2019-01-01

## 2019-05-09 PROBLEM — N17.9 ACUTE RENAL FAILURE SUPERIMPOSED ON CHRONIC KIDNEY DISEASE, UNSPECIFIED CKD STAGE, UNSPECIFIED ACUTE RENAL FAILURE TYPE: Status: RESOLVED | Noted: 2019-01-01 | Resolved: 2019-01-01

## 2019-05-09 PROBLEM — J18.9 PNEUMONIA DUE TO INFECTIOUS ORGANISM, UNSPECIFIED LATERALITY, UNSPECIFIED PART OF LUNG: Status: RESOLVED | Noted: 2019-01-01 | Resolved: 2019-01-01

## 2019-05-09 NOTE — PROGRESS NOTES
Reena Shelter, 11/20/1929, 80year old, female    Chief Complaint:  Patient presents with:   Follow - Up: CHF, biltaeral ankle fractures  Lab Results  Pain  Weakness       Subjective:      Patient is an 80year old female with previous medical history in nondistended; no organomegaly, no masses; nontender, no guarding, no rebound tenderness.   :Deferred  LYMPHATIC:no lymphedema   MUSCULOSKELETAL: no acute synovitis upper or lower extremity, pain and tenderness bilateral ankles and heels  EXTREMITIES/VASCU completed now  - no urinary sx at this time     B/L ankle fractures right subacute lateral malleolus avulsion fracture, left distal fibula fracture  - non operative tx per ortho, WBAT BLE,   - WBAT per ortho  - CAM for comfort only LLE and transfers  - [employer]

## 2019-05-09 NOTE — PROGRESS NOTES
[place] Author: Majo Khoury MD     1929 MRN [id number]   [facility]  Admission 19      Last Hospital Discharge 19 PCP Maine Iqbal MD   Hospital of Discharge 19       Date of Admission: 5/3/ disturbances. Patient denies any abdominal pain at this time. Patient has no new skin lesions. Patient reports no acute back pain and reports no dizziness or headaches.    Patient reports no visual disturbances and reports hearing has been about the same PLUS-VIT D OR) Take 1 tablet by mouth daily. No current facility-administered medications on file prior to visit.        HISTORY:  She  has a past medical history of Abnormality of gait (6/29/2012), Acute myocardial infarction ([facility] [address] (3/19/2012), Anxie complication, not stated as uncontrolled, Unspecified essential hypertension (6/29/2012), Unspecified hypothyroidism (6/29/2012), Unspecified urinary incontinence (6/29/2012), Unspecified venous (peripheral) insufficiency (6/29/2012), and Visual impairment to person, place, and time. She appears well-developed and well-nourished. No distress. HENT:   Head: Normocephalic and atraumatic. Nose: Nose normal.   Mouth/Throat: No oropharyngeal exudate. Eyes: Pupils are equal, round, and reactive to light.  Rig specified symptoms and signs involving the circulatory and respiratory systems T17.908A Unspecified foreign body in respiratory tract, part unspec*  COMPARISON:  [facility] , XR CHEST AP PORTABLE  (CPT=71045), 3/22/2018, 18:25.   [facility] , CT CHEST(CONTRAST ONLY resp status  PATIENT STATED HISTORY: (As transcribed by Technologist)  Choking episode with pill with decline in respiratory status.    CINE CAPTURES:  3 FLUORSCOPY TIME:  37seconds RADIATION DOSE (AIR KERMA PRODUCT):  32.8 uGy/m2   FINDINGS:  PHARYNGEAL PH 1.00 x10(3) uL    Eosinophil Absolute 0.00 0.00 - 0.70 x10(3) uL    Basophil Absolute 0.11 0.00 - 0.20 x10(3) uL    Immature Granulocyte Absolute 0.10 0.00 - 1.00 x10(3) uL    Neutrophil % 59.2 %    Lymphocyte % 26.6 %    Monocyte % 12.1 %    Eosinophil % kidney    17. Pulmonary HTN (HCC)  -stable, CPm    18. Paroxysmal atrial fibrillation (HCC)  -stable, CPM.  High risk for fall. Not good candidate, rate and HTN control.            Oddis Overcast needs then following services:  Occupational Therapy  Physi

## 2019-05-10 NOTE — TELEPHONE ENCOUNTER
Last TSH was 3/28/19 and due for repeat in 6 weeks. Pedro Matias states she is at [facility] in care of Dr. Heidi Gusman they can see orders through [facility].  She verbalized understanding and will remind the nurse to have patient complete labs

## 2019-05-10 NOTE — TELEPHONE ENCOUNTER
Patient's daughter, April He, called and requested to know if patient's thyroid should be re checked, and please send the order to [facility]; daughter claimed someone in the office knows how to send order.

## 2019-05-13 NOTE — PROGRESS NOTES
Nabil [place], 11/20/1929, 80year old, female    Chief Complaint:  Patient presents with:   Follow - Up: bilateral ankle fractures, CHF  Lab Results  Weakness    Subjective:      Patient is an 80year old female with previous medical history including H tender, FROM  BREAST: deferred  RESPIRATORY:clear, no crackles, no wheezing, no dyspnea, no cough, room air  CARDIOVASCULAR: RRR, S1 and S2, no murmurs, mild non pitting pedal edema  ABDOMEN:  normal active BS+, soft, nondistended; no organomegaly, no mass and gave fluids resolving  - monitoring now  - renal eval inpt follow up with DR Lisbeth Waters prn  - creat 1.4 today from 1.3     Hyperuricemia with GOUT  - Uric acid 11.9 5/2/19  - ankle pain improved     UTI- resolved   - ECOLI with ESBL?  - Keflex 5/8 comp

## 2019-05-17 NOTE — PROGRESS NOTES
Zeeshan Quiñones, 11/20/1929, 80year old, female    Chief Complaint:  Patient presents with:   Follow - Up: CHF, right and left ankle fractures  Weakness  Lab Results  Pain    Subjective:      Patient is an 80year old female with previous medical history air  CARDIOVASCULAR: RRR, S1 and S2, no murmurs, mild non pitting pedal edema  ABDOMEN:  normal active BS+, soft, nondistended; no organomegaly, no masses; nontender, no guarding, no rebound tenderness.   :Deferred  LYMPHATIC:no lymphedema   MUSCULOSKELET Marci prn  - creat 1.66 from  from 1.3     Hyperuricemia with GOUT  - Uric acid 11.9 5/2/19  - ankle pain improved     B/L ankle fractures right subacute lateral malleolus avulsion fracture, left distal fibula fracture  - non operative tx per ortho, WB

## 2019-05-23 NOTE — PROGRESS NOTES
Cinthia Mccoy, 11/20/1929, 80year old, female    Chief Complaint:  Patient presents with:   Follow - Up: bilateral ankle fractures, CHF, gout, DM   Diabetes  Pain  Weakness    Subjective:      Patient is an 80year old female with previous medical histo no wheezing, no dyspnea, no cough, room air  CARDIOVASCULAR: RRR, S1 and S2, no murmurs, mild non pitting pedal edema  ABDOMEN:  normal active BS+, soft, nondistended; no organomegaly, no masses; nontender, no guarding, no rebound tenderness.   :Deferred colchicine inpt, held ACE-I and gave fluids resolving  - monitoring now  - renal eval inpt follow up with DR Cesilia Almendarez prn  - creat 1.5 from  from 1.3     Hyperuricemia with GOUT  - Uric acid 11.9 5/2/19  - ankle pain improved     B/L ankle fractures right

## 2019-05-24 NOTE — PROGRESS NOTES
Amelia , 11/20/1929, 80year old, female    Chief Complaint:  Patient presents with:   Follow - Up: bilateral ankle fx, CHF, gout  Diabetes    Subjective:      Patient is an 80year old female with previous medical history including HTN, DM, CAD, C S2, no murmurs, 1+ non pitting pedal edema  ABDOMEN:  normal active BS+, soft, nondistended; no organomegaly, no masses; nontender, no guarding, no rebound tenderness.   :Deferred  LYMPHATIC:no lymphedema   MUSCULOSKELETAL: no acute synovitis upper or low 5/2/19  - ankle pain improved     B/L ankle fractures right subacute lateral malleolus avulsion fracture, left distal fibula fracture  - non operative tx per ortho, WBAT BLE,   - WBAT per ortho  - CAM for comfort only LLE and transfers  - Ortho follow up D

## 2019-06-03 NOTE — PROGRESS NOTES
Shahla Good, 11/20/1929, 80year old, female    Chief Complaint:  Patient presents with:   Follow - Up: CHF, bilateral heel wounds, bilaterl ankle fx  Hyperglycemia  Wound Recheck  Lab Results       Subjective:    Patient is an 80year old female with developed, well nourished, in no apparent distress, +resting in bed w/ air mattress  LINES, TUBES, DRAINS:  none  SKIN: pink, warm, dry  WOUND:   Wound RN providing wound care  Right Heel wound:  Entire posterior calcaneous involved, unstagable d/t slough 12/21/2011     Lab Results   Component Value Date     (H) 06/03/2019    BUN 55 (H) 06/03/2019    BUNCREA 32.2 (H) 06/03/2019    CREATSERUM 1.71 (H) 06/03/2019    ANIONGAP 5 06/03/2019    GFR 27 (L) 10/10/2017    GFRNAA 26 (L) 06/03/2019    GFRAA 30 noted Cardiology input  - monitoring for fluid overload, daily weights  - follow up with Jhonny GARCIA cardiology  - weights stable  - monitor in light of recent GLEN     HTN, CAD hx MI, CABG X5, stents, Afib  - metoprolol 100 mg daily  - holding ACE fo

## 2019-06-07 NOTE — PROGRESS NOTES
Amber Aldana, 11/20/1929, 80year old, female    Chief Complaint:  Patient presents with:   Follow - Up: Bilateral ankle fx, Gout, heel wounds, CHF  Weakness  Pain       Subjective:    Patient is an 80year old female with previous medical history inclu PERRLA, EOMI, sclera anicteric, conjunctiva normal; there is no nystagmus, no drainage from eyes  HENT: normocephalic; normal nose, no nasal drainage, mucous membranes pink, moist.  NECK: supple, non tender, FROM  BREAST: deferred  RESPIRATORY:clear to [employer] 06/06/2019    CL 99 06/06/2019    CO2 29.0 06/06/2019       Assessment and plan:     GLEN on CKD III w hyperkalemia, 2/2 prerenal azotemia with NVD on admit  - stopped colchicine inpt, held ACE-I and gave fluids resolving  - monitoring now  - renal eval inpt Gabapentin 200 mg PO QHS  - Increase Lantus to 8 units Qhs  - low dose ISS novolog increase to medium dose AC TID and HS  - NCS diet and Ensure protein, low carb     Wounds bilateral heels and buttocks  - wound care MD and RN  - offloading w/ air mattress

## 2019-06-10 NOTE — PROGRESS NOTES
Reena Shelter, 11/20/1929, 80year old, female    Chief Complaint:  Patient presents with:   Follow - Up: Bilateral ankle fractures, heel wounds, DM, Gout       Subjective:    Patient is an 80year old female with previous medical history including HTN, central pinpoint eschar area noted, no foul odor and no drainage  Sacral/perineal shear/pressure sores:  +erythema, no foul odor or drainage  EYES: PERRLA, EOMI, sclera anicteric, conjunctiva normal; there is no nystagmus, no drainage from eyes  HENT: norm 2.3 (L) 06/06/2019    GLOBULT 2.7 05/12/2011    GLOBULIN 4.1 06/06/2019    AGRATIO 1.6 02/21/2007    ALBGLOBRAT 1.5 05/12/2011     (L) 06/06/2019    K 4.3 06/06/2019    CL 99 06/06/2019    CO2 29.0 06/06/2019       Assessment and plan:     GLEN on CKD on steroids for gout  - glimiperide 4mg daily  -Now hyperglycemia globally and anticipate additional increase w/ addition of prednisone for gout tx  - A1C 6.9 on 5/1/2019  - Gabapentin 200 mg PO QHS  - Lantus to 12 units Qhs  - ISS novolog medium dose AC T

## 2019-06-13 NOTE — PROGRESS NOTES
Russ Peña, 11/20/1929, 80year old, female    Chief Complaint:  Patient presents with:   Follow - Up: bilateral ankle fx, bilateral wounds, DM, CHF, gout  Lab Results  Pain  Weakness  Diabetes       Subjective:    Patient is an 80year old female w with erythema  Right top of foot yellow scab  Left lateral heel blister wound:  Closed; no foul odor and no drainage  Sacral/perineal shear/pressure sores: 1*1,  +erythema, no foul odor or drainage  EYES: PERRLA, EOMI, sclera anicteric, conjunctiva normal; BILT 0.5 06/12/2019    TP 6.7 06/12/2019    ALB 2.5 (L) 06/12/2019    GLOBULT 2.7 05/12/2011    GLOBULIN 4.2 06/12/2019    AGRATIO 1.6 02/21/2007    ALBGLOBRAT 1.5 05/12/2011     06/12/2019    K 4.1 06/12/2019     06/12/2019    CO2 30.0 06/1 and HS  - NCS diet and Ensure protein, low carb     Wounds bilateral heels and buttocks  - wound care MD and RN  - offloading w/ air mattress and PRAFO boots and turn and reposition q 2 hrs and prn  - optimize nutrition and glucose control  -Vitamin C 500

## 2019-06-19 NOTE — PROGRESS NOTES
Russ Peña, 11/20/1929, 80year old, female    Chief Complaint:  Patient presents with:   Follow - Up: bilateral ankle fx, gout, DM, heel wound       Subjective:    Patient is an 80year old female with previous medical history including HTN, DM, CA great toe eschar, with erythema  Right top of foot yellow scab  Left lateral heel blister wound:  Closed; no foul odor and no drainage  Sacral/perineal shear/pressure sores:  +erythema, no foul odor or drainage  EYES: PERRLA, EOMI, sclera anicteric, conjun 06/12/2019    BILT 0.5 06/12/2019    TP 6.7 06/12/2019    ALB 2.5 (L) 06/12/2019    GLOBULT 2.7 05/12/2011    GLOBULIN 4.2 06/12/2019    AGRATIO 1.6 02/21/2007    ALBGLOBRAT 1.5 05/12/2011     06/12/2019    K 4.1 06/12/2019     06/12/2019    CO 5/1/2019  - Gabapentin 100 mg PO QHS  - Lantus to 15 units Qhs  - ISS novolog medium dose AC TID and HS  - NCS diet and Ensure protein, low carb     Wounds bilateral heels and buttocks  - wound care MD and RN  - offloading w/ air mattress and PRAFO boots a

## 2019-06-21 NOTE — PROGRESS NOTES
Rhonda Ravinder, 11/20/1929, 80year old, female    Chief Complaint:  Patient presents with:   Follow - Up: bilateral ankle fx, CHF, UTI, gout, wounds  Lab Results  Weakness  Pain       Subjective:    Patient is an 80year old female with previous medica posterior calcaneous slough noted,  Mild periwound erythema, no foul odor or drainage  Right great toe eschar  EYES: PERRLA, EOMI, sclera anicteric, conjunctiva normal; there is no nystagmus, no drainage from eyes  HENT: normocephalic; normal nose, no nasa 1.5 05/12/2011     06/21/2019    K 4.5 06/21/2019     06/21/2019    CO2 31.0 06/21/2019     Lab Results   Component Value Date    COLORUR Yellow 06/19/2019    CLARITY Cloudy (A) 06/19/2019    SPECGRAVITY 1.011 06/19/2019    GLUUR Negative 06/19 limit 200 mg dialy for her     B/L ankle fractures right subacute lateral malleolus avulsion fracture, left distal fibula fracture  - non operative tx per ortho, WBAT BLE,   - WBAT per ortho appreciate follow up notes Dr Christiano Lim one month follow up again infection  - Penicillin daily     Labs  CBC, BMP weekly      Follow Ups:  PCP within 7 days of [facility] APN cardiology /Dr Va Lopez APN, TCC post DC  Ortho follow up daughter will schedule  Renal as needed  Urology as needed    [facility]

## 2019-06-23 PROBLEM — R53.1 GENERALIZED WEAKNESS: Status: ACTIVE | Noted: 2019-01-01

## 2019-06-23 PROBLEM — N18.30 UNCONTROLLED SECONDARY DIABETES MELLITUS WITH STAGE 3 CKD (GFR 30-59) (HCC): Status: RESOLVED | Noted: 2017-04-10 | Resolved: 2019-01-01

## 2019-06-23 PROBLEM — E13.65 UNCONTROLLED SECONDARY DIABETES MELLITUS WITH STAGE 3 CKD (GFR 30-59) (HCC): Status: RESOLVED | Noted: 2017-04-10 | Resolved: 2019-01-01

## 2019-06-23 PROBLEM — E13.22 UNCONTROLLED SECONDARY DIABETES MELLITUS WITH STAGE 3 CKD (GFR 30-59) (HCC): Status: RESOLVED | Noted: 2017-04-10 | Resolved: 2019-01-01

## 2019-06-23 PROBLEM — S82.831D CLOSED FRACTURE OF DISTAL END OF RIGHT FIBULA WITH ROUTINE HEALING: Status: ACTIVE | Noted: 2019-01-01

## 2019-06-23 PROBLEM — S82.832D CLOSED FRACTURE OF DISTAL END OF LEFT FIBULA WITH ROUTINE HEALING: Status: ACTIVE | Noted: 2019-01-01

## 2019-06-23 PROBLEM — IMO0002 UNCONTROLLED SECONDARY DIABETES MELLITUS WITH STAGE 3 CKD (GFR 30-59): Status: RESOLVED | Noted: 2017-04-10 | Resolved: 2019-01-01

## 2019-06-23 NOTE — PROGRESS NOTES
[place] Author: Majo Khoury MD     1929 MRN [id number]   [facility]  Admission 19      Last Hospital Discharge 19 PCP Maine Iqbal MD   Hospital of Discharge 19       Date of Admission: 5/3/ denies any abdominal pain at this time. Patient has no new skin lesions. Patient reports no acute back pain and reports no dizziness or headaches. Patient reports no visual disturbances and reports hearing has been about the same.    Patient reports no r 3350 Oral Powd Pack Take 17 g by mouth daily as needed.      Levothyroxine Sodium 125 MCG Oral Tab Take 1 tablet (125 mcg total) by mouth before breakfast. Repeat thyroid labs in 6 weeks   Clopidogrel Bisulfate 75 MG Oral Tab Take 1 tablet (75 mg total) by blood(790.99) (4/8/2014), PAD [facility]peripheral artery disease) [facility] (4/8/2014), Peripheral vascular disease ([facility] Pneumonia due to organism, Polyneuropathy in diabetes (6/29/2012), Primary localized osteoarthrosis, upper arm (6/20/2013), PVD (peripheral vascu other family member. She  reports that she has never smoked. She has never used smokeless tobacco. She reports that she does not drink alcohol or use drugs.      ROS:   A comprehensive 14 point review of systems was completed.     Pertinent positives and exhibits normal muscle tone. Coordination normal.   Skin: Skin is warm and dry. No rash noted. She is not diaphoretic. No erythema. No pallor. Psychiatric: She has a normal mood and affect.  Her behavior is normal. Judgment and thought content normal.   N the right lateral malleolus of uncertain age.    Dictated by: Cyndy Culp MD on 4/05/2019 at 22:14     Approved by: Cyndy Culp MD            Xr Video Swallow (myz=[id number])    PROCEDURE:  XR VIDEO SWALLOW (CPT=74230)  TECHNIQUE:  Video fluorosco 10.7 4.0 - 11.0 x10(3) uL    RBC 3.48 (L) 3.80 - 5.30 x10(6)uL    HGB 10.6 (L) 12.0 - 16.0 g/dL    HCT 33.7 (L) 35.0 - 48.0 %    .0 150.0 - 450.0 10(3)uL    MCV 96.8 80.0 - 100.0 fL    MCH 30.5 26.0 - 34.0 pg    MCHC 31.5 31.0 - 37.0 g/dL    RDW 16. ([address]  -stable, continue with asa. 11. Painful diabetic neuropathy (HCC)  -tight sugar control, lantus to continue and low dose ISS. 12. Primary osteoarthritis of both knees  -stable, continue with pt/ot    13.  Uncontrolled secondary diabetes melli

## 2019-06-24 NOTE — PROGRESS NOTES
Pratima Prabhakar, 11/20/1929, 80year old, female is being discharged from [address] [place] [address] at [address] [facility]    Date of Admission: 5/3/19    Date of Discharge: 6/24/19                                 Admitting Diagnoses:   GLEN FROM  BREAST: deferred  RESPIRATORY:clear to auscultation anteriorly and posteriorly, diminished at bases; no wheezing, no dyspnea, on room air  CARDIOVASCULAR: RRR, S1 and S2, no murmurs,  No pedal edema  ABDOMEN:  normal active BS+, soft, nondistended; n GLOBULT 2.7 05/12/2011    GLOBULIN 4.1 06/21/2019    AGRATIO 1.6 02/21/2007    ALBGLOBRAT 1.5 05/12/2011     06/21/2019    K 4.5 06/21/2019     06/21/2019    CO2 31.0 06/21/2019         Discharge Diagnoses w/ current management:    ALEJANDRINA  - Anat Purdy hyperglycemia on steroids for gout  - glimiperide 4mg daily DC  -Now hyperglycemia globally ; completed prednisone for gout tx improving now with increased lantus  - A1C 6.9 on 5/1/2019  - Gabapentin 100 mg PO QHS  - Lantus to 18 units Qhs  - ISS novolog m

## 2019-06-25 NOTE — TELEPHONE ENCOUNTER
Spoke to pt and dtr per HIPPA for TCM today. Dtr requesting refill for DM test strips. She states pt has a Contour Next EZ glucometer and is checking BG TID. Please advise. TRIAGE:  Please call dtr back with update on refill request once available.

## 2019-06-25 NOTE — TELEPHONE ENCOUNTER
Asserted care  Nurse is wondering if Dr. Jennifer Stock will sign orders for wound care. Ordered pt, ot, home health aid  She has a fungal rash in groin & bottom  Topical treatment and medication for wounds. Please advise.

## 2019-06-25 NOTE — PROGRESS NOTES
Initial Post Discharge Follow Up   Discharge Date: 6/24/19--d/c from [facility] Date: 6/25/2019    Consent Verification:  Assessment Completed With: Caregiver: Ike thompson received per patient?  written  HIPAA Verified?   Yes    Disch What are some barriers or concerns? Unable to walk on RLE due to wound on heel. • Were you given a specific diet to follow at discharge?    no    Medications:      Current Outpatient Medications:  insulin glargine (LANTUS SOLOSTAR) 100 UNIT/ML Subcutaneou Powd Pack Take 17 g by mouth daily as needed.    Disp:  Rfl:    Levothyroxine Sodium 125 MCG Oral Tab Take 1 tablet (125 mcg total) by mouth before breakfast. Repeat thyroid labs in 6 weeks Disp: 60 tablet Rfl: 0   Clopidogrel Bisulfate 75 MG Oral Tab Take Department [facility])    Jun 27, 2019  1:00 PM CDT Exam - Established Patient with Felicitas Noguera, APRN [address] [facility], [place] ([address] [facility])        Jul 08, 2019 10:30 AM CDT FOLLOW UP with Lupe Salvador MD DU strips. BOOK BY DATE: 7/8/19    [x]  Discharge Summary, Discharge medications reviewed/discussed/and reconciled against outpatient medications with patient,  and orders reviewed and discussed.  Any changes or updates to medications and or orders sent to

## 2019-06-25 NOTE — TELEPHONE ENCOUNTER
Spoke with [facility] informing her that Dr. Agueda Escobedo [facility], PT, OT, HHA. Needs refill on nystatin cream for fungal rash and also santyl for heel wound. Rxs sent. HHN aware.

## 2019-06-27 NOTE — PROGRESS NOTES
HPI:    Patient ID: Dee Dee Alcantara is a 80year old female.     Patient presents with:  Hospital F/U: pt daughter states her shakiness is getting better, concerned about pt heel---right foot and wants education on wound care      Discharged from the sprin fibrillation ([facility] 6/29/2012   • Back pain 4/8/2014    Lumbar    • Cancer ([facility]     skin cancer   • Carotid stenosis 4/8/2014   • Carpal tunnel syndrome 6/20/2013    Log Date: 01/17/2012    • Cataract     left -- 2008 and right -- 2009   • Cellulitis and a Stroke ([facility] 4/8/2014   • Type II or unspecified type diabetes mellitus without mention of complication, not stated as uncontrolled    • Unspecified essential hypertension 6/29/2012   • Unspecified hypothyroidism 6/29/2012   • Unspecified urinary incontine KNEE REPLACEMENT  04/7/2006    rt     Family History   Problem Relation Age of Onset   • Diabetes Father    • Other (heart failure) Father    • Other (alzheimer's disease) Mother    • Heart Disease Other         fam hx-sister,brother, child   • Other (hear Disp:  Rfl:    Insulin Aspart Pen 100 UNIT/ML Subcutaneous Solution Pen-injector Inject into the skin TID CC and HS. Sliding scale low dose   Disp:  Rfl:    acetaminophen 325 MG Oral Tab Take 325 mg by mouth every 6 (six) hours as needed for Pain.  Disp:  R Comment:Muscle spasms    Lab Results   Component Value Date     (H) 06/27/2019    BUN 76 (H) 06/27/2019    BUNCREA 47.2 (H) 06/27/2019    CREATSERUM 1.61 (H) 06/27/2019    ANIONGAP 6 06/27/2019    GFR 27 (L) 10/10/2017    GFRNAA 28 (L) 06/27/2019 not present. Pulmonary/Chest: Effort normal. No respiratory distress. She has rales (fine crackles right base). Abdominal: Soft. Bowel sounds are normal. She exhibits no distension. There is no tenderness.    Neurological: She is alert and oriented to pe

## 2019-06-27 NOTE — TELEPHONE ENCOUNTER
Detailed message was left. The pt has 2 wounds (heel and coccyx) the heel wound size is about 5 inches.

## 2019-06-27 NOTE — TELEPHONE ENCOUNTER
Elizabeth, the pharmacy tech needs the number of wounds and wound sizes for the sample ointment. Verbal answer will do, please call [phone]. She has confidential voicemail so a voicemail is ok too.

## 2019-06-28 NOTE — TELEPHONE ENCOUNTER
Spoke with [facility] informing her ok to r/s visit to day. Pt has an appt with outpatient wound care this afternoon and [facility] nurse will see pt on Monday.

## 2019-06-28 NOTE — TELEPHONE ENCOUNTER
Alex Lares from [place] [address] left a voicemail requesting to [facility]dule a [facility] visit from yesterday to today. Please c/b at #[phone].

## 2019-06-29 NOTE — PROGRESS NOTES
Chief Complaint  This information was obtained from the patient  Patient is here for an initial consult. She presents with wounds on her heel and buttock.      Allergies  adhesive tape (Reaction: rash), amlodipine (Reaction: swelling), latex (Reaction: ra 9/26/2013  • Disorder of thyroid    • Esophageal reflux 6/29/2012  • Foot fracture 04/2019    right and left  • Gout 9/16/2010  • Gout    • Gouty arthritis    • H/O head injury      major  • Hearing impairment    • Heart attack ([facility]    • High blood pressu 3/19/2009  • ANGIOPLASTY (CORONARY)        2/11/2011  • CABG   11/1990    (x5), 5 vessel [facility])  • CAROTID ENDARTERECTOMY   10/17/1995    left  • CAROTID ENDARTERECTOMY   02/28/1996    rt  • CATARACT   2009    right  • CATH DRUG ELUTING STENT file     Years of education: Not on file     Highest education level: Not on file   Tobacco Use     Smoking status: Never Smoker     Smokeless tobacco: Never Used   Substance and Sexual Activity     Alcohol use: No     Drug use: No   Other Topics     Heather No    Medications  acetaminophen - oral 325 mg tablet  gabapentin - oral 100 mg capsule  Acidophilus Probiotic - oral 100 million cell-10 mg capsule  metoprolol succinate - oral 100 mg tablet extended release 24 hr  Caltrate 600+D Plus Minerals - oral 600 normal. The periwound skin color is normal.    Wound #2 Right Heel is a chronic Unstageable Pressure Injury Obscured full-thickness skin and tissue loss Pressure Ulcer and has received a status of Not Healed.  Initial wound encounter measurements are 4.4cm selective debridement with a total area debrided of 25.52 sq cm was performed by Snehal Hammer MD. to remove devitalized tissue: biofilm, fibrin, and slough. The following instrument(s) were used: forceps and scissors.  Pain control was achieved usi

## 2019-07-01 NOTE — TELEPHONE ENCOUNTER
From: Cinthia Mccoy  To: EMMANUEL Chand  Sent: 6/30/2019 9:35 PM CDT  Subject: Visit Follow-up Question    Rose Mayo  Here are glucose / blood pressure readings: 6/28 8:00 am 140. 156/69.  Pulse 78; 12:40 ; 6:15   6/29 9:05 am 114. 145/6

## 2019-07-02 NOTE — TELEPHONE ENCOUNTER
Verbal approval for PT twice weekly for 3 weeks. The pt may have an O2 saturation every visit once pain is controlled.

## 2019-07-03 NOTE — TELEPHONE ENCOUNTER
Plan of [facility] reports patient had PT/OT evaluation yesterday.  The plan of care will be to see patient once on week 2 followed by 2 days on week 3 for upper extemity deficit, sitting and standing balance deficit, low endurance, increase ADL assistanc

## 2019-07-03 NOTE — TELEPHONE ENCOUNTER
Charis Dc from [facility] called and stated she completed the initial OT visit yesterday, and needs to speak to a RN to discuss POC. Please advise.

## 2019-07-08 PROBLEM — Z16.12 ESBL (EXTENDED SPECTRUM BETA-LACTAMASE) PRODUCING BACTERIA INFECTION: Status: ACTIVE | Noted: 2019-01-01

## 2019-07-08 PROBLEM — R79.89 AZOTEMIA: Status: ACTIVE | Noted: 2019-01-01

## 2019-07-08 PROBLEM — A49.9 ESBL (EXTENDED SPECTRUM BETA-LACTAMASE) PRODUCING BACTERIA INFECTION: Status: ACTIVE | Noted: 2019-01-01

## 2019-07-08 PROBLEM — N17.9 ACUTE KIDNEY INJURY (HCC): Status: ACTIVE | Noted: 2019-01-01

## 2019-07-08 PROBLEM — R73.9 HYPERGLYCEMIA: Status: ACTIVE | Noted: 2019-01-01

## 2019-07-08 NOTE — CONSULTS
INFECTIOUS DISEASE CONSULT NOTE    Shahla Good Patient Status:  Emergency    1929 MRN [id number]   [address] Attending Callie Ferreira MD   [facility] Day # 0 (left bundle branch block) 4/8/2014   • Leg swelling 9/15/2011    lower leg localized swelling   • Measles 9/16/2010   • Mitral valve disorders(424.0) 6/29/2012   • Muscle weakness    • Neuropathy    • OA (osteoarthritis) 4/8/2014   • Open wound of foot ex N/A 7/15/2014    Performed by Constance Moeller MD at [facility] ENDOSCOPY   • COLONOSCOPY N/A 7/14/2014    Performed by Aby Lennon MD at [facility] ENDOSCOPY   • ESOPHAGOGASTRODUODENOSCOPY (EGD) N/A 7/14/2014    Performed by Aby Lennon MD at [facility] ENDO See pertinent positives and negatives in the the HPI. Physical Exam:    General: No acute distress. Alert and oriented x 3. Vital signs: Blood pressure 156/54, pulse 70, temperature 98 °F (36.7 °C), temperature source Temporal, resp.  rate 16, weight 16 Large*  Moderate*  --          Microbiology    Reviewed in EMR,    Radiology: Xr Chest Ap Portable  (cpt=71045)    Result Date: 7/8/2019  PROCEDURE:  XR CHEST AP PORTABLE  (CPT=71045)  TECHNIQUE:  AP chest radiograph was obtained.   COMPARISON:  EDHUONG , JOAN

## 2019-07-08 NOTE — TELEPHONE ENCOUNTER
Lakhwinder Cifuentes, Speech Therapist from [facility] called and requested a call back if ok to continue speech therapy 1x for 2 weeks for caregiver training, and taking O2 levels at [address] [place] [address].

## 2019-07-08 NOTE — ED PROVIDER NOTES
Patient Seen in: [facility] Emergency Department    History   Patient presents with:  Urinary Symptoms (urologic)    Stated Complaint: UTI    HPI    22-year-old woman presents to the emergency department with her daughter with whom she lives and son inhibitor 4/8/2014   • CVA (cerebral infarction) 4/8/2014   • Diabetes mellitus ([facility] 9/26/2013   • Disorder of thyroid    • Esophageal reflux 6/29/2012   • Foot fracture 04/2019    right and left   • Gout 9/16/2010   • Gout    • Gouty arthritis    • H/O h diabetic ulcer L great toe   • ANGIOGRAM      2004   • ANGIOGRAM      11/6/2007   • ANGIOPLASTY (CORONARY)      3/19/2009   • ANGIOPLASTY (CORONARY)      2/11/2011   • CABG  11/1990    (x5), 5 vessel [facility])   • CAROTID ENDARTERECTOMY  10/17/1995 75   Resp 16   Temp 98 °F (36.7 °C)   Temp src Temporal   SpO2 98 %   O2 Device None (Room air)       Current:/54   Pulse 64   Temp 98 °F (36.7 °C) (Temporal)   Resp 17   Wt 75 kg   SpO2 95%   BMI 25.14 kg/m²         Physical Exam    Elderly nontoxic DRAW GOLD   BLOOD CULTURE   BLOOD CULTURE          XR CHEST AP PORTABLE  (CPT=71045)   Final Result    PROCEDURE:  XR CHEST AP PORTABLE  (CPT=71045)         TECHNIQUE:  AP chest radiograph was obtained.          COMPARISON:  [facility] , XR CHEST AP PORTABLE  ( her home if she does not require IV antibiotics. 1 dose of meropenem was given, cultures will be pending.               Disposition and Plan     Clinical Impression:  ESBL E. coli carrier  (primary encounter diagnosis)  Stage 3 chronic kidney disease ([facility]

## 2019-07-08 NOTE — ED NOTES
Pt's family report pt normally is due to norco for chronic foot pain.   Requesting she take norco at this time

## 2019-07-08 NOTE — PROGRESS NOTES
Critical access hospital Pharmacy Note: Antimicrobial Weight Dose Adjustment for: meropejennifer García is a 80year old female who has been prescribed meropenem (MERREM) 1 g once.   CrCl is estimated creatinine clearance is 23.9 mL/min (A) (based on SCr of 1.61 mg/dL (

## 2019-07-08 NOTE — ED NOTES
Patient straight cathed for urine sample using sterile technique. Patient tolerated procedure. Small wound on right buttock- open. Family aware.

## 2019-07-09 NOTE — TELEPHONE ENCOUNTER
Giving PT physical therapy beginning today  For 3 weeks. Once per week.   Wants confirmation about Oxygen if below 90%

## 2019-07-09 NOTE — TELEPHONE ENCOUNTER
Lizz Cruz RN states patient was seen in the ED yesterday for ESBL, she had a straight cath and culture came back negative. Was told she needed culture from wound clinic to r/o infection elsewhere (poss ankle wound).    Reports patient has appointment with

## 2019-07-10 NOTE — PROGRESS NOTES
Chief Complaint  This information was obtained from the patient  Patient is here for a follow up for buttocks and heel wounds.  Dtr states she went to ED monday where initially she tested positive ESBL in urine, cath urine was negative and ID doctor feels 6/29/2012  • Chronic sinusitis    • Congestive heart disease ([facility]    • Coronary atherosclerosis of unspecified type of vessel, native or graft 6/29/2012  • Cough due to ACE inhibitor 4/8/2014  • CVA (cerebral infarction) 4/8/2014  • Diabetes mellitus ([facility] insufficiency 6/29/2012  • Visual impairment       Past Surgical History:  Procedure Laterality Date  • ACTIVE WOUND CARE/20 CM OR <   9/27/2011    diabetic ulcer L great toe  • ANGIOGRAM        2004  • ANGIOGRAM        11/6/2007  • ANGIOPLASTY (CORONARY) Other         child  • Other (skin cancer) Other         child  • Other (ulcerative colitis) Other         child  • Stroke Sister       Social History   Socioeconomic History     Marital status:       Spouse name: Not on file     Number of children: signs or symptoms of infection. Local Pulse is Weak. Wound #3 Right Buttock is a chronic Stage 3 Pressure Injury Pressure Ulcer and has received a status of Not Healed.  Subsequent wound encounter measurements are 1cm length x 0.5cm width with no measura full-thickness skin and tissue loss.         Plan    Wound Orders:  Wound #2 Right Heel     Topicals:  Initial Anesthetic Order: Apply lidocaine to wound bed on all future wound center visits during preparation for physician exam if wound bed contains fibri

## 2019-07-10 NOTE — TELEPHONE ENCOUNTER
Spoke with [facility] PT giving approval for [facility] PT. She reports pt's oxygen has been stable during therapy.

## 2019-07-10 NOTE — TELEPHONE ENCOUNTER
Joanell Schlatter from [facility] [place] [facility] called and requested a verbal order to add one more nurse visit on Friday.

## 2019-07-11 NOTE — TELEPHONE ENCOUNTER
Unable to add test on. Contacted [facility], and requested to have the lab drawn at home. The phlebotomist will be at the pt's home later today. Left message for the pt's daughter to give the office a call back.

## 2019-07-11 NOTE — TELEPHONE ENCOUNTER
----- Message from Ashvin Bess MD sent at 7/11/2019 11:35 AM CDT -----  Stable. Ckd.  Normal electrolyte  Uric acid is normal  Stable anemia  Please check LAP score for elevated WBC eval

## 2019-07-12 NOTE — TELEPHONE ENCOUNTER
Verbal orders for OT, upper extrem,core strength, sitting balance,endurance, will be monitoring oxygen levels when going below 90%, plan of care

## 2019-07-18 NOTE — TELEPHONE ENCOUNTER
----- Message from Tim Motley MD sent at 7/18/2019 11:58 AM CDT -----  Cr has worsened. Now stage 4. Likely result from diuretic. Will need recheck bmp in 1 week. Normal electrolytes. Stay hydrated      Referral and lab is pending.

## 2019-07-18 NOTE — TELEPHONE ENCOUNTER
Kenan Wallace M.D. Hematology & Oncology   [facility]  [address] [facility], [address]  [phone]      Referral is pending. Left message for the pt's daughter to give the office a call back.

## 2019-07-18 NOTE — TELEPHONE ENCOUNTER
The daughter is aware of the results. Due to the pt's foot wound and the extreme heat, the daughter will wait on scheduling an appointment with the hematologist at this time. Order for the BMP was entered and  was contacted about the blood draw.

## 2019-07-18 NOTE — TELEPHONE ENCOUNTER
----- Message from Sharad Seymour MD sent at 7/18/2019 11:28 AM CDT -----  Unchanged leukocytosis, have pt see hematology

## 2019-07-22 NOTE — TELEPHONE ENCOUNTER
From: Shahla Good  To: Brenda Rudd MD  Sent: 7/22/2019 10:35 AM CDT  Subject: Prescription Question    Please provide a new prescription of NORCO (HYDROcodone-acetaminophen)  MG Tabs 4 times a day every y hours as needed for pain.     Comments

## 2019-07-22 NOTE — TELEPHONE ENCOUNTER
The pt would like to use a TENS unit for the pain in the right heel. The PT will avoid the wound but activate the lower leg and the front of the foot. Verbal approval was given.

## 2019-07-22 NOTE — TELEPHONE ENCOUNTER
Josué Lynch, physical therapist with [facility] , called to get an order for a tens machine to use for pain of R foot and R leg. Please c/b at #[phone].

## 2019-07-24 PROBLEM — S92.001B: Status: ACTIVE | Noted: 2019-01-01

## 2019-07-24 NOTE — ED INITIAL ASSESSMENT (HPI)
Patient presents via EMS for evaluation of a wound to her right foot. She states that she has had this wound for several months but today it opened up and was bleeding. Bleeding controlled with bandage from paramedics. Denies fevers.

## 2019-07-24 NOTE — ED PROVIDER NOTES
Patient Seen in: [facility] Emergency Department    History   Patient presents with:  Laceration Abrasion (integumentary)    Stated Complaint: wound to right foot    79-year-old female presents today with injury to the right heel.   Recent has a  6/29/2012   • Foot fracture 04/2019    right and left   • Gout 9/16/2010   • Gout    • Gouty arthritis    • H/O head injury     major   • Hearing impairment    • Heart attack ([facility]    • High blood pressure    • High cholesterol    • Hypercholesterolemia 4/ 2/11/2011   • CABG  11/1990    (x5), 5 vessel [facility])   • CAROTID ENDARTERECTOMY  10/17/1995    left   • CAROTID ENDARTERECTOMY  02/28/1996    rt   • CATARACT  2009    right   • CATH DRUG ELUTING STENT     • CATH PERCUTANEOUS  TRANSLUMINAL CO Temp 97.6 °F (36.4 °C) (Temporal)   Resp 17   Ht 175.3 cm (5' 9\")   Wt 75 kg   SpO2 97%   BMI 24.42 kg/m²         Physical Exam   Constitutional: She is oriented to person, place, and time. She appears well-developed and well-nourished. No distress.    H Abnormality         Status                     ---------                               -----------         ------                     CBC W/ DIFFERENTIAL[[id number]]          Abnormal            Final result                 Please view results for these krista recommended.    Dictated by: Chris Nicholson MD on 7/24/2019 at 18:12     Approved by: Chris Nicholson MD            Xr Foot, Complete (min 3 Views), Right (cpt=73630)    Result Date: 7/24/2019  PROCEDURE:  XR FOOT, COMPLETE (MIN 3 VIEWS), RIGHT (CPT=73630)  TECHNI which may represent chronic interstitial changes versus mild edema 2. Minimal bilateral basilar opacities likely representing atelectasis although minimal developing infiltrates cannot be excluded.     Dictated by: Dylon Staton MD on 7/08/2019 at 16:17 displaced fracture of right calcaneus S92.001B 7/24/2019 Unknown

## 2019-07-24 NOTE — ED NOTES
Spoke with Shahram Herrmann in Pharmacy, does not believe they have medihoney per MD request. Will wait for order to be placed to see where the item comes from.

## 2019-07-25 NOTE — PROGRESS NOTES
Assumed care of patient, requesting something for pain. Bunny boot to left foot, right on pillow. Dressing to right foot cdi. Ritika wick in place. Family at bedside, all questions answered. Dr. Angelina Cui to see this evening, family made aware.

## 2019-07-25 NOTE — H&P
[facility]ITAL[facility]  History and Physical     Christianomitch Celaya Patient Status:  Inpatient    1929 MRN [id number]   [facility] 3SW-A Attending Jose Guadalupe Moody MD   Hosp Day # 0 PCP David Ross MD     Chief Complaint: right ankle pain graft 6/29/2012   • Cough due to ACE inhibitor 4/8/2014   • CVA (cerebral infarction) 4/8/2014   • Diabetes mellitus ([facility] [address] 9/26/2013   • Disorder of thyroid    • Esophageal reflux 6/29/2012   • Foot fracture 04/2019    right and left   • Gout 9/16/2010   • Laterality Date   • ACTIVE WOUND CARE/20 CM OR <  9/27/2011    diabetic ulcer L great toe   • ANGIOGRAM      2004   • ANGIOGRAM      11/6/2007   • ANGIOPLASTY (CORONARY)      3/19/2009   • ANGIOPLASTY (CORONARY)      2/11/2011   • CABG  11/1990    (x5), 5 hx-sister,brother, child   • Other (heart failure) Sister    • Other (colon polyps) Other         child   • Other (skin cancer) Other         child   • Other (ulcerative colitis) Other         child   • Stroke Sister         Allergies:   Adhesive Tape (two) times daily. To groin area as needed. Disp: 30 g Rfl: 0   collagenase (SANTYL) 250 UNIT/GM External Ointment Apply 1 Application topically daily as needed. To right heel.  Disp: 30 g Rfl: 1   Insulin Aspart Pen 100 UNIT/ML Subcutaneous Solution Pen-in (1.753 m)   Wt 165 lb 5.5 oz (75 kg)   SpO2 97%   BMI 24.42 kg/m²   General: No acute distress. Alert and oriented x 3. HEENT: Normocephalic atraumatic. Moist mucous membranes. EOM-I. PERRLA. Anicteric. Neck: No lymphadenopathy. No JVD.  No carotid bruits controlled  7. CAD with previous CABG  8. DMII, hyperglycemia protocol  9. Gout, cont Allopurinol    Quality:  · DVT Prophylaxis: lovenox  · CODE status: DNR  · Diane: no    Plan of care discussed with patient and family.     Jennifer Montiel MD  7/24/2019

## 2019-07-25 NOTE — PLAN OF CARE
Pain managed on oral pain meds. Drsg changed by wound care. Daughter at bedside. Dr Miller Fears to see later today. IV antibiotics in progress.

## 2019-07-25 NOTE — ED NOTES
Patient had episode of incontinence. Brief changed and patient cleaned. Soiled linen from home placed in a bag for family.

## 2019-07-25 NOTE — PROGRESS NOTES
Dr. Saeed Mckay made aware of pt being hardstick. Unable to obtain second blood culture.  As per MD, RN can start IV atb

## 2019-07-25 NOTE — PROGRESS NOTES
Pharmacy renal dose adjustment for metoclopramide (Reglan)    Karthikeyan Zavala has been prescribed metoclopramide 10 mg every 8 hours as needed for nausea/vomiting. Estimated Creatinine Clearance: 26.8 mL/min (A) (based on SCr of 1.49 mg/dL (H)).     Alline [place]

## 2019-07-25 NOTE — HOME CARE LIAISON
PTNT CURRENT WITH [facility] FOR SN/PT/OT EOE 8/26/19 WILL NEED A SOC ORDER ON OR BEFORE DC    THANKS  Trinh Self

## 2019-07-25 NOTE — CONSULTS
[facility]  Report of Inpatient Wound Care Consultation    Huber Jorge Patient Status:  Inpatient    1929 MRN [id number]   [facility] 3SW-A Attending Fortunato Waters MD   Hosp Day # 1 PCP Gentry Infante MD     Reason for Consu • OA (osteoarthritis) 4/8/2014   • Open wound of foot except toe(s) alone, without mention of complication 4/18/4920    diabetic foot ulcer/L great toe   • Other and unspecified hyperlipidemia 6/29/2012   • Other and unspecified noninfectious gastroenter 7/14/2014    Performed by Leeroy John MD at [facility] ENDOSCOPY   • [facility] CORONARY DRUG ELUTING STENT SINGLE      x2   • HIP REPLACEMENT SURGERY     • HIP REPLACEMENT SURGERY  7/1998, 5/2000    left side- 1998, right side- 2000   • KNEE REPLACEMENT WONG pulse.Pt complains of pain even with the slightest amount of pressure applied on the area. Recommendations:  Right heel ulcer- Cleanse with  saline. Pat dry. Apply honey gel until santyl ointment is available . Cover with moistened gauze[ use saline] the

## 2019-07-25 NOTE — PROGRESS NOTES
[facility] Pharmacy Note:  Renal Dose Adjustment for Enoxaparin (LOVENOX)    Warden Hartmann has been prescribed Enoxaparin (LOVENOX) 40 mg subcutaneously every 24 hours. Estimated Creatinine Clearance: 26.8 mL/min (A) (based on SCr of 1.49 mg/dL (H)).     Her

## 2019-07-25 NOTE — CONSULTS
INFECTIOUS DISEASE CONSULT NOTE    Jaziel Fall Patient Status:  Inpatient    1929 MRN [id number]   [facility] 3SW-A Attending Bernadette Schafer MD   [facility] Day # 1 PCP Isaac Salcedo • Gouty arthritis    • H/O head injury     major   • Hearing impairment    • Heart attack ([facility] [address]    • High blood pressure    • High cholesterol    • Hypercholesterolemia 4/8/2014   • Hyperuricemia    • LBBB (left bundle branch block) 4/8/2014   • Leg swelli • CAROTID ENDARTERECTOMY  10/17/1995    left   • CAROTID ENDARTERECTOMY  02/28/1996    rt   • CATARACT  2009    right   • CATH DRUG ELUTING STENT     • CATH PERCUTANEOUS  TRANSLUMINAL CORONARY ANGIOPLASTY     • COLONOSCOPY N/A 7/15/2014    Performed by Ryan Olmedo Latex                   RASH  Sulfa Antibiotics       HIVES  Atorvastatin            OTHER (SEE COMMENTS)    Comment:Muscle spasms    Medications:    Current Facility-Administered Medications:   •  Metoprolol Succinate ER (Toprol XL) 24 hr tab 100 mg, 100 Completed. See pertinent positives and negatives in the the HPI. Physical Exam:    General: No acute distress. Alert, seemed appropriate  Vital signs: Blood pressure 114/59, pulse 64, temperature 97.7 °F (36.5 °C), temperature source Oral, resp.  rate 15 PROCEDURE:  XR ANKLE (MIN 3 VIEWS), RIGHT (CPT=73610)  TECHNIQUE:  Three views were obtained. COMPARISON:  DENIS , XR ANKLE (2 VIEW), RIGHT (CPT=73600), 5/03/2019, 14:28.   INDICATIONS:  wound to right foot  PATIENT STATED HISTORY: (As transcribed by Gorsh PROCEDURE:  XR FOOT, COMPLETE (MIN 3 VIEWS), RIGHT (CPT=73630)  TECHNIQUE:  AP, oblique, and lateral views were obtained. COMPARISON:  EDWARD , XR ANKLE (2 VIEW), RIGHT (CPT=73600), 5/03/2019, 14:28.   EDWARD , XR ANKLE (MIN 3 VIEWS), RIGHT (CPT=73610), 7/ 3. Avulsion fracture R ankle- Dr Rox Connors will see later today    D/w wound RN    Thank you for allowing me to participate in the care of this patient. Please do not hesitate to call if you have any questions.    I will continue to follow with you and will seth

## 2019-07-25 NOTE — PROGRESS NOTES
[facility]  Progress Note     Christiano Celaya Patient Status:  Inpatient    1929 MRN [id number]   [facility] 3SW-A Attending Shukri Gallagher MD   Hosp Day # 1 PCP David Ross MD     Chief Complaint: foot pain    S: Patient s docusate sodium  100 mg Oral BID   • meropenem  500 mg Intravenous Q12H   • Insulin Aspart Pen  2-10 Units Subcutaneous TID CC and HS   • enoxaparin  30 mg Subcutaneous Nightly       ASSESSMENT / PLAN:     1. New avulsion fracture of right ankle, pain cont

## 2019-07-25 NOTE — PLAN OF CARE
Pt is AOX4 upon admission. However became confused at night. She was unable to tell me her , place where she is at and the reason why she is here. BED ALARM ENGAGED.  Pt's right ankle has a dressing upon admission - removed so this RN can assess it and t

## 2019-07-26 NOTE — PROGRESS NOTES
Thomaso called and was informed that pt has ESBL on the right heel wound which will require contact iso.  Pt already on contact iso for ESBL of the urine

## 2019-07-26 NOTE — PLAN OF CARE
Dr. Kyree Ashford and Dr. Radha Lanier notified of positive foot wound culture for ESBL. Will monitor.

## 2019-07-26 NOTE — CONSULTS
[address]  Cardiology Consultation    iCnthia Mccoy Patient Status:  Inpatient    1929 MRN [id number]   [facility] 3SW-A Attending Tona Harmon MD   Hosp Day # 2 PCP Joel Hope MD     Reason for Consultation:  Pre-op CV clearance Gouty arthritis    • H/O head injury     major   • Hearing impairment    • Heart attack ([facility] [address]    • High blood pressure    • High cholesterol    • Hypercholesterolemia 4/8/2014   • Hyperuricemia    • LBBB (left bundle branch block) 4/8/2014   • Leg swelling ENDARTERECTOMY  10/17/1995    left   • CAROTID ENDARTERECTOMY  02/28/1996    rt   • CATARACT  2009    right   • CATH DRUG ELUTING STENT     • CATH PERCUTANEOUS  TRANSLUMINAL CORONARY ANGIOPLASTY     • COLONOSCOPY N/A 7/15/2014    Performed by Ashley Benitez Succinate ER  100 mg Oral Daily   • Levothyroxine Sodium  125 mcg Oral Before breakfast   • Pantoprazole Sodium  40 mg Oral QAM AC   • gabapentin  200 mg Oral Nightly   • fentaNYL  1 patch Transdermal Q72H   • docusate sodium  100 mg Oral BID   • collagena 07/25/19  0537 07/26/19  0507    140 139   K 4.4 3.9 4.4   CL 99 104 105   CO2 30.0 30.0 27.0   BUN 94* 86* 86*   CREATSERUM 1.49* 1.34* 1.33*   ALT 22  --   --    AST 30  --   --    ALB 2.8*  --   --        No results for input(s): INR in the last 1

## 2019-07-26 NOTE — ANESTHESIA POSTPROCEDURE EVALUATION
[address] [facility] Patient Status:  Inpatient   Age/Gender 80year old female MRN [id number]   [facility] SURGERY Attending Dina Grullon, [address] [facility] Se Day # 2 PCP Mohini Zamora MD       Anesthesia Post-op Note    Procedure(s):

## 2019-07-26 NOTE — PLAN OF CARE
Pt is AOX1-2. She is confused at bedtime. As per daughter, this am, pt has been wanting to get out of the bed. She was crying d/t pain during shift change complaining of right heel wound. Morphine given IV x 1, pt tolerated it well.  RLE elevated on atleast

## 2019-07-26 NOTE — PROGRESS NOTES
[facility]  Progress Note     Rhonda Castellon Patient Status:  Inpatient    1929 MRN [id number]   [facility] 3SW-A Attending Andreia Huff MD   Hosp Day # 2 PCP Tacho Kitchen MD     Chief Complaint: foot pain    S: given mor Pantoprazole Sodium  40 mg Oral QAM AC   • gabapentin  200 mg Oral Nightly   • fentaNYL  1 patch Transdermal Q72H   • docusate sodium  100 mg Oral BID   • collagenase   Topical Daily   • ceFAZolin  2 g Intravenous Once   • Insulin Aspart Pen  2-10 Units Lomeli

## 2019-07-26 NOTE — PROGRESS NOTES
INFECTIOUS DISEASE PROGRESS NOTE    Shelton Kasal Patient Status:  Inpatient    1929 MRN [id number]   [facility] 3SW-A Attending Tedd Lesch, MD   Hosp Day # 2 PCP Anjum Knight MG/ML injection 1 mg, 1 mg, Intravenous, Q2H PRN **OR** morphINE sulfate (PF) 4 MG/ML injection 2 mg, 2 mg, Intravenous, Q2H PRN  •  ondansetron HCl (ZOFRAN) injection 4 mg, 4 mg, Intravenous, Q6H PRN  •  Metoclopramide HCl (REGLAN) injection 5 mg, 5 mg, I CULTURE, ROUTINE     Status: Abnormal (Preliminary result)    Collection Time: 07/24/19 11:12 PM   Result Value Ref Range    Urine Culture 50,000-99,000 CFU/ML Enterococcus species (A) N/A    Urine Culture <10,000 CFU/ML Gram negative bernadine N/A   2.  BLOOD insertion of the Achilles tendon and likely avulsion. Fragment separation approximately 0.7 cm. CONCLUSION:  Findings most consistent with a new avulsion fracture at the insertion of the Achilles tendon on the calcaneus.   Continued clinical correlati klebsiella pneumonia in the urine  - new ucx with Enterococcus but denies UTI symptoms  - Not sure why UA/cx sent on admission. No MS changes reported (she did not fall but hit herself while transferring), no fevers or UTI symptoms.  Did have mild leukocyto

## 2019-07-26 NOTE — ANESTHESIA PREPROCEDURE EVALUATION
PRE-OP EVALUATION    Patient Name: Amber Aldana    Pre-op Diagnosis: Decubitus ulcer, heel [N36.273]    Procedure(s):  DEBRIDMENT RIGHT HEEL ULCER AND PARTICAL CALCANECTOMY    Surgeon(s) and Role:     Meri Muhammad MD - Primary    Pre-op vitals rev Hold] Glucose-Vitamin C (DEX-4) 4-6 GM-MG chewable tab 8 tablet 8 tablet Oral Q15 Min PRN   [MAR Hold] Insulin Aspart Pen (NOVOLOG) 100 UNIT/ML flexpen 2-10 Units 2-10 Units Subcutaneous TID CC and HS   [MAR Hold] Enoxaparin Sodium (LOVENOX) 30 MG/0.3ML in Subcutaneous Solution Pen-injector Inject into the skin TID CC and HS. Sliding scale low dose   Disp:  Rfl:    Cholecalciferol (VITAMIN D) 1000 units Oral Tab Take 1,000 mg by mouth daily.  Disp:  Rfl:    Metoprolol Succinate  MG Oral Tablet 24 Hr Zac calcified leaflets.     Transvalvular velocity was within the normal range. There was no     stenosis. Mild regurgitation. 4. Mitral valve: Mildly calcified annulus. Mitral valve demonstrates mildly     calcified leaflets.  Transvalvular velocity was withi ENDOSCOPY   •  PLMT CORONARY DRUG ELUTING STENT SINGLE      x2   • HIP REPLACEMENT SURGERY     • HIP REPLACEMENT SURGERY  7/1998, 5/2000    left side- 1998, right side- 2000   • KNEE REPLACEMENT SURGERY     • KNEE REPLACEMENT SURGERY  9/16/2010    right management plan discussed with surgeon and patient.       Plan/risks discussed with: patient and child/children                Present on Admission:  • Acute gout due to renal impairment involving left ankle  • Closed fracture of distal end of right fibula

## 2019-07-26 NOTE — H&P
[facility]    PATIENT'S NAME: Ching Howard   ATTENDING PHYSICIAN: Guillermo Gonzalez MD   PATIENT ACCOUNT#:   [id number]    LOCATION:  [address] [place] [address]  MEDICAL RECORD #:   [id number]       YOB: 1929  ADMISSION DATE:       07/24/2019 anxiety, atherosclerosis of coronary vessels, back pain, lumbar pain.   She has a history of skin cancer, carotid stenosis, carpal tunnel syndrome, left and right cataracts, cellulitis, chronic sinusitis, congestive heart failure, cough due to ACE inhibitor 24.42.  VITAL SIGNS:  Stable. Temperature 97.6, pulse 70, respiratory rate 17, blood pressure 141/55. HEENT:  Unremarkable. LUNGS:  Clear. HEART:  Normal S1, S2, without murmur. ABDOMEN:  Benign.   EXTREMITIES:  Significant for a diabetic ulcer involv

## 2019-07-26 NOTE — INTERVAL H&P NOTE
Pre-op Diagnosis: Decubitus ulcer, heel [L89.609]    The above referenced H&P was reviewed by Shane Hernandez PA-C on 0/29/5108, the patient was examined and no significant changes have occurred in the patient's condition since the H&P was performed.   I disc

## 2019-07-26 NOTE — BRIEF OP NOTE
Pre-Operative Diagnosis: Decubitus ulcer, heel [L89.609]     Post-Operative Diagnosis: Decubitus ulcer, heel [L89.609]      Procedure Performed:   Procedure(s):  DEBRIDMENT RIGHT HEEL ULCER AND PARTICAL CALCANECTOMY    Surgeon(s) and Role:     * Marely Joseph

## 2019-07-26 NOTE — PLAN OF CARE
Notified by OR of possibility to move OR time to this AM. Pt asking that dgtr be the one to sign consent. Notified family, they are en route as they want to be here prior to OR start. Notified OR. Will monitor.

## 2019-07-26 NOTE — PLAN OF CARE
Pt pain managed with IV Morphine. VSS. NPO for OR with Dr. Pope Mom today. On bedrest. Incontinent, brief in place - purewick discontiued due to latex allergy. Plan: OR today. Will monitor.

## 2019-07-27 NOTE — PROGRESS NOTES
Order received for wound vac this AM. Wound care not here on weekends. Informed Dr Connie Renteria of this, who rounded this morning. Wound care to see and apply on Monday, or if pt ready for dc on Monday, pt may be dc'd to [facility] and have wound vac applied there.

## 2019-07-27 NOTE — PLAN OF CARE
Very drowsy, easy to arouse, oriented to person, reorient PRN. VSS. O2 3L per NC. Right heel pain controlled well with Norco PRN. Ace wrap to right foot C/D/I. RLE elevated on pillow. SCD to LLE/Lovenox resumed this evening per order. Voiding per brief.  PT

## 2019-07-27 NOTE — PROGRESS NOTES
[facility]  Progress Note     Christiano Celaya Patient Status:  Inpatient    1929 MRN [id number]   [facility] 3SW-A Attending Shukri Gallagher MD   Hosp Day # 3 PCP David Ross MD     Chief Complaint: foot pain    S: norco not Oral Daily   • Levothyroxine Sodium  125 mcg Oral Before breakfast   • Pantoprazole Sodium  40 mg Oral QAM AC   • gabapentin  200 mg Oral Nightly   • fentaNYL  1 patch Transdermal Q72H   • docusate sodium  100 mg Oral BID   • collagenase   Topical Daily

## 2019-07-27 NOTE — OCCUPATIONAL THERAPY NOTE
OCCUPATIONAL THERAPY EVALUATION - INPATIENT     Room Number: 373/373-A  Evaluation Date: 7/27/2019  Type of Evaluation: Initial  Presenting Problem: R ankle avulsion Fx     Physician Order: IP Consult to Occupational Therapy  Reason for Therapy: ADL/IADL D • CVA (cerebral infarction) 4/8/2014   • Diabetes mellitus ([facility] [address] 9/26/2013   • Disorder of thyroid    • Esophageal reflux 6/29/2012   • Foot fracture 04/2019    right and left   • Gout 9/16/2010   • Gout    • Gouty arthritis    • H/O head injury     daria diabetic ulcer L great toe   • ANGIOGRAM      2004   • ANGIOGRAM      11/6/2007   • ANGIOPLASTY (CORONARY)      3/19/2009   • ANGIOPLASTY (CORONARY)      2/11/2011   • CABG  11/1990    (x5), 5 vessel [facility])   • CAROTID ENDARTERECTOMY  10/17/1995 lives with daughter who provides 24/7 care and assist.  Pt's daughter completes UB/LB dressing for pt, transfers pt with use of RW and stand pivot method with min assist at baseline, and clothing management and pericare for toileting as well as UB/LB bathi Toileting, which includes using toilet, bedpan or urinal? : Total  -   Putting on and taking off regular upper body clothing?: A Lot  -   Taking care of personal grooming such as brushing teeth?: A Little  -   Eating meals?: A Little    AM-PAC Score:  Scor AROM in R ankle. . These deficits impact the patient’s ability to participate in  functional transfers, functional mobility, LB ADLs including toileting, dressing, and bathing, instrumental activities of daily living, rest and sleep, leisure and social part

## 2019-07-27 NOTE — CM/SW NOTE
Per RN pt is requesting referral to [facility]. PT rec is TIM but note not in chart as of this writing. [facility] referral sent but the [facility] will need PT note sent on Sunday.     [facility]  pgr [phone]

## 2019-07-27 NOTE — PLAN OF CARE
Pt alert, but forgetful. Knows she is in the hospital but not which one. Uncertain as to why at times. On room air. Scds to BLE. Bothering pt this afternoon, so Scds removed for a little while. Tolerating diet with fair appetite.  Blood sugars monitored and

## 2019-07-27 NOTE — PROGRESS NOTES
[facility]  Progress Note    Sabi Preston Patient Status:  Inpatient    1929 MRN [id number]   [facility] 3SW-A Attending Aldo Mccormick MD   [facility] Day # 3 PCP Sharad Seymour MD     Subjective:  Sabi Preston is a(n) 80 year Hyperglycemia     ESBL (extended spectrum beta-lactamase) producing bacteria infection     Open displaced fracture of right calcaneus     Open displaced fracture of right calcaneus, unspecified portion of calcaneus, initial encounter      R judy khan a

## 2019-07-27 NOTE — PROGRESS NOTES
[facility] [address] [place] [address] Cardiology Progress Note        Ramsey Ibanez Patient Status:  Inpatient    1929 MRN [id number]   [facility] 3SW-A Attending Bg Scales MD   Hosp Day # 3 PCP Eemrald Ross MD     Subjective:  No Chem:  Recent Labs   Lab 07/24/19  1917 07/25/19  0537 07/26/19  0507    140 139   K 4.4 3.9 4.4   CL 99 104 105   CO2 30.0 30.0 27.0   BUN 94* 86* 86*   CREATSERUM 1.49* 1.34* 1.33*   CA 9.4 8.8 9.0   * 131* 122*       Recent Labs   Lab

## 2019-07-27 NOTE — PHYSICAL THERAPY NOTE
PHYSICAL THERAPY EVALUATION - INPATIENT     Room Number: 373/373-A  Evaluation Date: 7/27/2019  Type of Evaluation: Initial  Physician Order: PT Eval and Treat    Presenting Problem: right calcaneal avulsion fracture s/p debridement and partial calca • Cancer [facility])     skin cancer   • Carotid stenosis 4/8/2014   • Carpal tunnel syndrome 6/20/2013    Log Date: 01/17/2012    • Cataract     left -- 2008 and right -- 2009   • Cellulitis and abscess of leg, except foot 6/29/2012   • Chronic sinusitis    • mellitus without mention of complication, not stated as uncontrolled    • Unspecified essential hypertension 6/29/2012   • Unspecified hypothyroidism 6/29/2012   • Unspecified urinary incontinence 6/29/2012   • Unspecified venous (peripheral) insufficiency HOME SITUATION  Type of Home: House   Home Layout: One level  Stairs to Enter : 1  Railing: No  Stairs to Bedroom: 0       Lives With: Daughter     Patient Owned Equipment: Rolling walker; Other (Comment)(wheelchair)       Prior Level of Delta: have...  -   Turning over in bed (including adjusting bedclothes, sheets and blankets)?: A Little   -   Sitting down on and standing up from a chair with arms (e.g., wheelchair, bedside commode, etc.): A Lot   -   Moving from lying on back to sitting on th reach;RN aware of session/findings; All patient questions and concerns addressed;SCDs in place; Family present    ASSESSMENT   Patient is a 80year old female admitted on 7/24/2019 for  right calcaneal avulsion fracture.   Pt is s/p calcaneal debridement and Chair/Wheelchair at assistance level: minimum assistance     Goal #3 Patient is able to ambulate 5 feet with assist device: walker - rolling at assistance level: minimum assistance     Goal #4    Goal #5    Goal #6    Goal Comments: Goals established on 7/

## 2019-07-27 NOTE — OPERATIVE REPORT
[address]    PATIENT'S NAME: Jeremy Martinez   ATTENDING PHYSICIAN: Guillermo Alfaro MD   OPERATING PHYSICIAN: Fred Shultz M.D.    PATIENT ACCOUNT#:   [de-identified]    LOCATION:  [address] [place] [address]  MEDICAL RECORD #:   [id number]       DATE OF BIRTH:  1 tendon had avulsed the posterior aspect of the calcaneus causing acute pain. The fractured bone was removed and submitted to Pathology, to look for osteomyelitis. An osteotome was used to remove all the diseased bone.   Raw cancellous surface was exposed

## 2019-07-27 NOTE — PLAN OF CARE
Pt arrived from PACU on bed. Family at bedside. VSS. Very drowsy but arousable. Denies nausea. Reminded of fall precautions. Will monitor.

## 2019-07-28 NOTE — CM/SW NOTE
Chart reviewed. Referral made to [facility] for [address]. Referral updated with therapy notes and additional clinical information. DON requested. / to remain available for support and/or discharge planning.      Steph Stoddard, LCS

## 2019-07-28 NOTE — PROGRESS NOTES
[facility] Cardiology Progress Note        Dora Marlena Patient Status:  Inpatient    1929 MRN [id number]   [facility] 3SW-A Attending Maximiliano Khan MD   [facility] Day # 4 PCP Hai Walls MD     Subjective:  No Chem:  Recent Labs   Lab 07/24/19  1917 07/25/19  0537 07/26/19  0507 07/28/19  0531    140 139 139   K 4.4 3.9 4.4 4.3   CL 99 104 105 105   CO2 30.0 30.0 27.0 28.0   BUN 94* 86* 86* 71*   CREATSERUM 1.49* 1.34* 1.33* 1.34*   CA 9.4 8.8 9.0 8.

## 2019-07-28 NOTE — PLAN OF CARE
Patient alert and oriented ,but forgetful , restless and irritable when in pain ,answer appropriately ,rates her pain 9/10 ,1mg iv morphine given with good relief and patient is relaxing and very pleasant now ,vital signs stable ,denies any chest pain or s

## 2019-07-28 NOTE — PROGRESS NOTES
[facility]  Progress Note    Koko Fowler Patient Status:  Inpatient    1929 MRN [id number]   [facility] 3SW-A Attending China Pantoja MD   Hosp Day # 4 PCP José Miguel Moralez MD     SUBJECTIVE:  INTERVAL HISTORY: S/P  4  Procedu

## 2019-07-28 NOTE — PLAN OF CARE
Pt A&O. Forgetful at times. Becomes anxious when daughter leaves. Pt states she is \"afraid\", but unable to tell me what she is afraid of. Emotional support given. Contact isolation precautions maintained for ESBL and VRE. Scds to LLE. Pt refusing on LLE.

## 2019-07-28 NOTE — PROGRESS NOTES
[facility]ITAL[facility]  Progress Note     Abimael Zelaya Patient Status:  Inpatient    1929 MRN [id number]   [place] [facility] 3SW-A Attending Andrew Giang MD   Hosp Day # 4 PCP Zeynep Siddiqui MD     Chief Complaint: foot pain    S: more awak Epic.    Medications:   • torsemide  10 mg Oral Daily   • Metoprolol Succinate ER  100 mg Oral Daily   • Levothyroxine Sodium  125 mcg Oral Before breakfast   • Pantoprazole Sodium  40 mg Oral QAM AC   • gabapentin  200 mg Oral Nightly   • fentaNYL  1 patc

## 2019-07-29 PROBLEM — Z47.89 ORTHOPEDIC AFTERCARE: Status: ACTIVE | Noted: 2019-01-01

## 2019-07-29 NOTE — PROGRESS NOTES
[facility] Progress Note        Christiano Celaya Patient Status:  Inpatient    1929 MRN [id number]   [facility] 3SW-A Attending Shukri Gallagher MD   [facility] Day # 5 PCP David Ross MD     Subjective:  No 11.8* 14.1*   HGB 11.8* 11.2* 10.6*   .0 235.0 224.0       Chem:  Recent Labs   Lab 07/24/19  1917 07/25/19  0537 07/26/19  0507 07/28/19  0531    140 139 139   K 4.4 3.9 4.4 4.3   CL 99 104 105 105   CO2 30.0 30.0 27.0 28.0   BUN 94* 86* 86*

## 2019-07-29 NOTE — PLAN OF CARE
A&O but forgetful at times. VSS. Pain well managed with PO pain medication. RA. SCDs. Lovenox. Voids without difficulties in brief. Isolation for VRE and ESBL in the urine. Fentanyl patch on back. ACE CDI. Diabetic managed with Novolog sliding scale.  APRIL wa

## 2019-07-29 NOTE — WOUND PROGRESS NOTE
[facility]  Report of Inpatient Wound Care Progress Note    Pratima Cornea Patient Status:  Inpatient    1929 MRN [id number]   [facility] 3SW-A Attending Jonathon Morejon MD   Hosp Day # 5 PCP Luciana Slaughter MD       SUBJECTIVE: blood(790.99) 4/8/2014   • PAD (peripheral artery disease) ([facility] 4/8/2014   • Peripheral vascular disease ([facility]    • Pneumonia due to organism    • Polyneuropathy in diabetes 6/29/2012   • Primary localized osteoarthrosis, upper arm 6/20/2013    Log Date: Catherine Ramsey MD at [facility] ENDOSCOPY   • COLONOSCOPY N/A 7/14/2014    Performed by Shanice Rojas MD at [facility] ENDOSCOPY   • ESOPHAGOGASTRODUODENOSCOPY (EGD) N/A 7/14/2014    Performed by Shanice Rojas MD at [facility] ENDOSCOPY   • Kelsea BINGHAM CORONARY DRUG Yareli Malcolm --   --   --   --   --    TP 7.6  --   --   --   --   --   --   --   --   --   --    PGLU  --    < >  --    < >  --    < >  --    < > 354* 162* 190*    < > = values in this interval not displayed.        Follow up Imaging and Microbiology results:     Keyla Cohen noted at the base, hematoma at the edges noted. There is no purulence noted, no induration. Cleansed the wound, applied Silvasorb hydrogel, adaptic, 4x4 gauze, ABD pad, kerlix to the surgical wound.   Xeroform, 4x4 gauze, ABD pad, kerlix to the dorsal

## 2019-07-29 NOTE — CM/SW NOTE
Spoke with Yen with [facility] [phone] this AM.  She requests wound care orders and then will be able to determine if facility can accept. She notes that pt has been to [facility] previously and is still in the same Medicare benefit period.   She only h

## 2019-07-29 NOTE — PROGRESS NOTES
INFECTIOUS DISEASE PROGRESS NOTE    Huber Jorge Patient Status:  Inpatient    1929 MRN [id number]   [facility] 3SW-A Attending Fortunato Waters MD   [facility] Day # 5 PCP Yane Kovacs Oral, Q6H PRN  •  morphINE sulfate (PF) 4 MG/ML injection 1 mg, 1 mg, Intravenous, Q2H PRN **OR** morphINE sulfate (PF) 4 MG/ML injection 2 mg, 2 mg, Intravenous, Q2H PRN  •  ondansetron HCl (ZOFRAN) injection 4 mg, 4 mg, Intravenous, Q6H PRN  •  Metoclopr --    TP 7.6  --   --   --        Microbiology    Reviewed in EMR,   Hospital Encounter on 07/24/19   1.  URINE CULTURE, ROUTINE     Status: Abnormal    Collection Time: 07/24/19 11:12 PM   Result Value Ref Range    Urine Culture 50,000-99,000 CFU/ML Entero Sensitive      Levofloxacin >=8 Resistant      Piperacillin + Tazobactam >=128 Resistant      Trimethoprim/Sulfa >=320 Resistant          Radiology:     reviewed       ASSESSMENT/ PLAN:    1. R heel ulcer  - did not seem infected on exam, although base not

## 2019-07-30 NOTE — CM/SW NOTE
MSW spoke with Gavino Guzman. They will arrange ambulance  for 6pm tonight going to the [facility] at [facility]. MSW informed [facility] via [facility]. PCS form completed for [employer] and placed on chart.     [facility] [place] [facility]

## 2019-07-30 NOTE — PLAN OF CARE
Pt A & O x self which is baseline per daughter, pt progressively more confused at night. On RA, /IS. SCDs. Lovenox. Incontinent-brief. Up with total lift. Pt stood at side of bed with PT today. Accu checks ACHS. Fentanyl patch Right upper back.  Julián nadia

## 2019-07-30 NOTE — PROGRESS NOTES
Paged Dr. Von Rust at this time to see if pt can be discharged to [place] [id number]. Spoke with Asaf Menendez, NP for cardiology, Dr. Maria L hCase to come see pt after clinic at noon. Dr. Von Rust called back, will see pt this afternoon, Midline ordered and IV invanz.  Called PI

## 2019-07-30 NOTE — PROGRESS NOTES
Report called to [address] at the [place]. Pt d/c'd by Ambulance. Sent with rx for norco, antibiotic, and fentanylpatch. 1730 Fentanyl patch removed and new patch applied to right upper back.

## 2019-07-30 NOTE — PROGRESS NOTES
[facility] Patient Status:  Inpatient    1929 MRN [id number]   [facility] 3SW-A Attending Shukri Gallagher MD   [facility] Day # 5 PCP MD Christiano Butt is a 80year old female patient.     Patient Essential hypertension         Date Noted: 01/15/2016      Hyperlipemia, mixed         Date Noted: 01/15/2016      History of stroke         Date Noted: 01/15/2016      Atherosclerosis of native coronary artery of native heart without angina pectoris Muscle weakness    • Neuropathy    • OA (osteoarthritis) 4/8/2014   • Open wound of foot except toe(s) alone, without mention of complication 2/04/7438    diabetic foot ulcer/L great toe   • Other and unspecified hyperlipidemia 6/29/2012   • Other and unsp (chronic kidney disease) stage 3, GFR 30-59 ml/min ([facility])    Closed fracture of distal end of right fibula with routine healing    Acute gout due to renal impairment involving left ankle    ESBL (extended spectrum beta-lactamase) producing bacteria infectio

## 2019-07-30 NOTE — PLAN OF CARE
Pt received on bed, AOX1-2. Pleasantly confused. Reinforced dressing on her right heel. Heel protectors maintained. DNR. Pain management plan explained, verbalized understanding. Every 2 hour repositioning and pt tolerating it well. SCDs bilaterally.  Jeremiah Ashraf

## 2019-07-30 NOTE — PLAN OF CARE
Pt A&Ox2-3, per hx per pt family, forgetful & sleepy today per pt hx per family. On ra,  & scds in place. Tolerating diet, decreased appetite. Voiding in brief per hx of incontinence, pt having multiple soft bms today brown in color.  Ritika area pink in c

## 2019-07-30 NOTE — PROGRESS NOTES
INFECTIOUS DISEASE PROGRESS NOTE    Shahla Good Patient Status:  Inpatient    1929 MRN [id number]   [facility] 3SW-A Attending Garrett Cortés MD   [facility] Day # 6 ZANE Barton 30 mg, 30 mg, Subcutaneous, Nightly  •  acetaminophen (TYLENOL) tab 650 mg, 650 mg, Oral, Q6H PRN  •  morphINE sulfate (PF) 4 MG/ML injection 1 mg, 1 mg, Intravenous, Q2H PRN **OR** morphINE sulfate (PF) 4 MG/ML injection 2 mg, 2 mg, Intravenous, Q2H PRN 106  --   --   --    AST 30  --   --   --    ALT 22  --   --   --    BILT 0.2  --   --   --    TP 7.6  --   --   --        Microbiology    Reviewed in EMR,   Hospital Encounter on 07/24/19   1.  URINE CULTURE, ROUTINE     Status: Abnormal    Collection Time >=4 Resistant      Gentamicin <=1 Sensitive      Meropenem <=0.25 Sensitive      Levofloxacin >=8 Resistant      Piperacillin + Tazobactam >=128 Resistant      Trimethoprim/Sulfa >=320 Resistant          Radiology:     reviewed       ASSESSMENT/ PLAN:    1

## 2019-07-30 NOTE — OCCUPATIONAL THERAPY NOTE
OCCUPATIONAL THERAPY TREATMENT NOTE - INPATIENT     Room Number: 373/373-A  Session: 1/5  Number of Visits to Meet Established Goals: 5    Presenting Problem: R ankle avulsion Fx       History related to current admission: Pt is 80year old female admitted Disorder of thyroid    • Esophageal reflux 6/29/2012   • Foot fracture 04/2019    right and left   • Gout 9/16/2010   • Gout    • Gouty arthritis    • H/O head injury     major   • Hearing impairment    • Heart attack ([facility] [facility]    • High blood pressure    • Hi ANGIOPLASTY (CORONARY)      3/19/2009   • ANGIOPLASTY (CORONARY)      2/11/2011   • CABG  11/1990    (x5), 5 vessel PATIENTS [facility])   • CALCANEAL OSTEOTOMY Right 7/26/2019    Performed by Swetha Garcia MD at [facility] MAIN OR   • CAROTID ENDARTERECTOMY  10/17 SATURATIONS                ACTIVITIES OF DAILY LIVING ASSESSMENT  AM-PAC ‘6-Clicks’ Inpatient Daily Activity Short Form  How much help from another person does the patient currently need…  -   Putting on and taking off regular lower body clothing?: Total independence and prevent deconditioning. The AM-KO ' '6-Clicks' Inpatient Daily Activity Short Form was completed and  this patient  is demonstrating a 70% degree impairment in activities of daily living.  Research supports that patients with this level

## 2019-07-30 NOTE — PHYSICAL THERAPY NOTE
PHYSICAL THERAPY TREATMENT NOTE - INPATIENT    Room Number: 373/373-A     Session: 1   Number of Visits to Meet Established Goals: 5    Presenting Problem: S/p Right calcaneal avulsion fracture s/p debridement and partial calcanectomy (7/26/19)  History r syndrome 6/20/2013    Log Date: 01/17/2012    • Cataract     left -- 2008 and right -- 2009   • Cellulitis and abscess of leg, except foot 6/29/2012   • Chronic sinusitis    • Congestive heart disease ([facility]    • Coronary atherosclerosis of unspecified type essential hypertension 6/29/2012   • Unspecified hypothyroidism 6/29/2012   • Unspecified urinary incontinence 6/29/2012   • Unspecified venous (peripheral) insufficiency 6/29/2012   • Visual impairment        Past Surgical History  Past Surgical History: SUBJECTIVE  \"I can't do it. I need to go back to sleep. \" Patient needed max encouragement to participate. No family present.     Patient’s self-stated goal is unclear    OBJECTIVE  Precautions: Seizure;Hard of hearing    WEIGHT BEARING RESTRICTION  W in bed. Needed mod to min assist to roll in bed. Patient needed mod to max assist of 2 for supine to sit transfers. Sat @ edge of bed with poor+ to fair- balance. Patient was able to stand with max assist of 2, minimal weight bearing on right LE.  Dependent rehabilitation patient should achieve min asst level in bed mobility and transfers. PLAN  PT Treatment Plan: Bed mobility; Body mechanics; Endurance; Energy conservation;Patient education; Family education;Gait training;Strengthening;Transfer training; Selena Salazar

## 2019-07-30 NOTE — PROGRESS NOTES
[facility]ITAL[facility]  Progress Note     Cody Ann Patient Status:  Inpatient    1929 MRN [id number]   [facility] 3SW-A Attending Leela Walsh MD   [address] Day # 5 PCP Jamal Ashley MD     Chief Complaint: foot pain    S: no change 7/30/2019] torsemide  10 mg Oral Daily   • Metoprolol Succinate ER  100 mg Oral Daily   • Levothyroxine Sodium  125 mcg Oral Before breakfast   • Pantoprazole Sodium  40 mg Oral QAM AC   • gabapentin  200 mg Oral Nightly   • fentaNYL  1 patch Transdermal Q

## 2019-07-30 NOTE — PROGRESS NOTES
[facility] Cardiology Progress Note - Johnnyjing Thomas Patient Status:  Inpatient    1929 MRN [id number]   [facility] 3SW-A Attending Pat Giles MD   [facility] Day # 6 PCP Newton Whitmore MD     Subjective:  Complain fracture of right calcaneus, unspecified portion of calcaneus, initial encounter  Global 10/22/2019     Orthopedic aftercare      Objective:   Temp: 98.2 °F (36.8 °C)  Pulse: 66  Resp: 16  BP: 123/50    Intake/Output:     Intake/Output Summary (Last 24 naren RASH  Sulfa Antibiotics       HIVES  Atorvastatin            OTHER (SEE COMMENTS)    Comment:Muscle spasms    Medications:    Current Facility-Administered Medications:  ertapenem (INVANZ) 1 g in sodium chloride 0.9% 100 mL MBP 1 g Intravenous Daily   tors weight stable  Constitutiona: no recent fevers  Skin: denies any unusual skin lesions or rashes  Eyes: no visual complaints or deficits  HEENT: denies nasal congestion, sinus pain; hearing loss negative  Respiratory: denies shortness of breath, wheezing or

## 2019-07-31 NOTE — DISCHARGE SUMMARY
[facility] HOSPITALIST  DISCHARGE SUMMARY     Russ Peña Patient Status:  Inpatient    1929 MRN [id number]   [facility] 3SW-A Attending No att. providers found   [facility] Day # 6 PCP Anat Stevenson MD     Date of Admission: 2019  Da was bleeding uncontrollably. They called EMS and patient was brought into the ER. She complained of significant pain to the right ankle. Imaging was consistent with a new avulsion fracture at the insertion of the Achilles tendon on the calcaneus.   Lissy Grajeda TYLENOL      Take 325 mg by mouth every 6 (six) hours as needed for Pain. Refills:  0     allopurinol 100 MG Tabs  Commonly known as:  ZYLOPRIM      Take 2 tablets (200 mg total) by mouth daily.    Quantity:  90 tablet  Refills:  0     aspirin 81 MG Tbec Sodium 125 MCG Tabs  Commonly known as:  SYNTHROID, LEVOTHROID      Take 1 tablet (125 mcg total) by mouth before breakfast. Repeat thyroid labs in 6 weeks   Quantity:  60 tablet  Refills:  0     Metoprolol Succinate  MG Tb24  Commonly known as:   Top medications is not yet available    Ask your nurse or doctor about these medications  · Normal Saline Flush 0.9 % Soln         [place]    Follow-up appointment:   Ai Capone MD  [address] [place] [id number] [phone]

## 2019-08-01 NOTE — PROGRESS NOTES
Warden Hartmann  : 1929  Age 80year old  female patient is admitted to Facility: [facility] [place] [facility] for [address] and wound management, medical management    [address] [facility] [address] date:  2019  Discharge date to [facility]:   2019  E • Abnormality of gait 6/29/2012   • Acute myocardial infarction [facility]) 3/19/2012   • Anxiety state    • Atherosclerosis of coronary artery    • Atrial fibrillation ([facility] 6/29/2012   • Back pain 4/8/2014    Lumbar    • Cancer ([facility]     skin cancer   • Dipti Perez injection    • Scarlet fever 9/16/2010   • Seizure disorder ([facility]     related to brain injury in 2008; no reported seizures since   • Seizures ([facility]    • Stroke ([facility] 4/8/2014   • Type II or unspecified type diabetes mellitus without mention of complicatio SPUR      left foot: 1972 and right foot: 1973   • SPINE SURGERY PROCEDURE UNLISTED     • TONSILLECTOMY  age 10   • TOTAL HIP REPLACEMENT     • TOTAL KNEE REPLACEMENT  07/08/1998    left   • TOTAL KNEE REPLACEMENT  05/31/2000    rt   • TOTAL KNEE REPLACEMEN units 261-300 Give 10 units 301-350 Call if glucose is < 70 or >350 Disp: 15 mL Rfl: 5   insulin glargine (LANTUS SOLOSTAR) 100 UNIT/ML Subcutaneous Solution Pen-injector Inject 24 Units into the skin nightly.  Disp:  Rfl: 0   Ascorbic Acid (VITAMIN C) 100 Disp: 90 tablet Rfl: 1   aspirin 81 MG Oral Tab EC Take 81 mg by mouth daily. Disp:  Rfl:    Calcium-Vitamin D (CALTRATE 600 PLUS-VIT D OR) Take 1 tablet by mouth daily.    Disp:  Rfl:    Sodium Chloride Flush (NORMAL SALINE FLUSH) 0.9 % Intravenous Solutio normocephalic; normal nose, no nasal drainage, mucous membranes pink, moist.  NECK: supple, non tender, FROM  BREAST: deferred  RESPIRATORY:clear, no crackles, no wheezing, no dyspnea, no cough, room air  CARDIOVASCULAR: RRR, S1 and S2, no murmurs, no ganga partial calcanectomy, weakness and inability to perform ADLs  - Follow up with Dr Deann Matute in 3 weeks  - follow up with ID Dr Archana Mccartney in 2 weeks  - Wound care with wound vac changes MWF, Offloading,  follow up with Theodora Rodriguez Dr wound care clinic on Fridays  - I

## 2019-08-02 NOTE — PROGRESS NOTES
Dora Mendiola, 11/20/1929, 80year old, female    Chief Complaint:  Patient presents with:   Follow - Up: Right heel chronic ulcer s/p injury and achilles rupture, s/p debvridement and calcaneactomy  Pain  Lab Results       Subjective:    Patient is an 8 large opening, deep wound, surfaces dark,   right top of foot intact blister on reddened skin, toe tips with eschar, buttocks excoriated,   EYES: PERRLA, EOMI, sclera anicteric, conjunctiva normal; there is no nystagmus, no drainage from eyes  HENT: normoc 08/02/2019    CO2 29.0 08/02/2019     Prealbumin 12.1      Assessment and plan:    Right calcaneal avulsion fx, chronic right heel ulcer/wound now s/p debridement and partial calcanectomy, weakness and inability to perform ADLs  - Follow up with Dr Antwan Ponce APRN  8/2/2019

## 2019-08-07 NOTE — PROGRESS NOTES
[place] Author: Genesis Galeana MD     1929 MRN [id number]   [facility]  Admission 19      Last Hospital Discharge 19 PCP Marybelle Mortimer, MD   Hospital of Discharge 19       Date of Admission: 7 sulfa antibiotics; and atorvastatin. Current Meds:    Current Outpatient Medications on File Prior to Visit:  Ertapenem Sodium (INVANZ) 1 g Intravenous Recon Soln Inject 5 mL (500 mg total) into the vein daily for 14 days.  Weekly cbc bmp esr and crp i Pain.   Cholecalciferol (VITAMIN D) 1000 units Oral Tab Take 1,000 mg by mouth daily. Metoprolol Succinate  MG Oral Tablet 24 Hr Take 100 mg by mouth daily. Montelukast Sodium 10 MG Oral Tab Take 10 mg by mouth nightly.    Multiple Vitamin (TAB-A- (left bundle branch block) (4/8/2014), Leg swelling (9/15/2011), Measles (9/16/2010), Mitral valve disorders(424.0) (6/29/2012), Muscle weakness, Neuropathy, OA (osteoarthritis) (4/8/2014), Open wound of foot except toe(s) alone, without mention of complic (coronary); cath percutaneous  transluminal coronary angioplasty; cath drug eluting stent; spine surgery procedure unlisted; and total hip replacement.     She family history includes Diabetes in her father; Heart Disease in an other family member; Stroke i no guarding. No hernia. Musculoskeletal: Normal range of motion. She exhibits no edema or tenderness. Lymphadenopathy:     She has no cervical adenopathy. Neurological: She is alert and oriented to person, place, and time. She has normal reflexes.  Sh Achilles tendon and likely avulsion. Fragment separation approximately 0.7 cm. CONCLUSION:  Findings most consistent with a new avulsion fracture at the insertion of the Achilles tendon on the calcaneus. Continued clinical correlation recommended. FINDINGS:  Atheromatous calcifications of the aorta. Stable cardiac size. Mild interstitial opacities. No pleural effusions. No pneumothorax. Surgical clips overlying the left hemithorax. CONCLUSION:  1.  Mild interstitial opacities which may repr MCH 30.7 26.0 - 34.0 pg    MCHC 31.9 31.0 - 37.0 g/dL    RDW 15.5 (H) 11.0 - 15.0 %    RDW-SD 54.8 (H) 35.1 - 46.3 fL    Neutrophil Absolute Prelim 5.37 1.50 - 7.70 x10 (3) uL    Neutrophil Absolute 5.37 1.50 - 7.70 x10(3) uL    Lymphocyte Absolute 3.99 ID  -PT/OT to continue, pain's stable.   -continue with Lantus and ISS  -continue with CHF medication: plavis, asa, BB, torsemide  -continue allopurinol  -continue thyroid meds  -constipation meds prn.   -watching labs, CPM    Pilar Willett needs then f

## 2019-08-07 NOTE — PROGRESS NOTES
Chief Complaint  This information was obtained from the patient  Patient is here for a follow up for heel wound.  Wounds noted to great and second toe. pain to heel 8/10    Allergies  adhesive tape (Reaction: rash), amlodipine (Reaction: swelling), latex She discharged home and left ankle pain became worse. She went back to ED for eval and was found to have left ankle fx oblique distal fibula. Also dx with GLEN and hyperuricemia, hyponatremia.   She was DC back to [facility] for TIM and [employer], and • Other and unspecified noninfectious gastroenteritis and colitis 6/29/2012  • Other nonspecific findings on examination of blood(790.99) 4/8/2014  • PAD [facility]peripheral artery disease[facility] 4/8/2014  • Peripheral vascular disease ([facility]    • Pneumonia due to left side- 1998, right side- 2000  • KNEE REPLACEMENT SURGERY      • KNEE REPLACEMENT SURGERY   9/16/2010    right  • KNEE TOTAL REVISION Right 7/1/2014    Performed by Rowan Boast, MD at [facility] MAIN OR  • LAMINECTOMY      • ORAL SURGERY PROCEDURE   age 25 Height/Length: 69 in (175.26 cm), Weight: 166 lbs (75.45 kgs), BMI: 24.5, Temperature: 97.7 °F (36.5 °C), Pulse: 63 bpm, Respiratory Rate: 16 breaths/min, Blood Pressure: 114/55 mmHg, Capillary Blood Glucose: 207 mg/dl.      Physical Exam Notes   Patient sl The periwound skin moisture is normal. The periwound skin color is normal. The temperature of the periwound skin is Warm. Periwound skin does not exhibit signs or symptoms of infection. Local Pulse is Weak.     Wound #5 Right, Distal Great Toe is an acute U (Encounter Diagnosis) E11.622 - Type 2 diabetes mellitus with other skin ulcer  (Encounter Diagnosis) L89.313 - Pressure ulcer of right buttock, stage 3        Plan    Wound Orders:  Wound #2 Right Heel     Topicals:  Initial Anesthetic Order: Apply lidoca

## 2019-08-07 NOTE — PROGRESS NOTES
Beverly Hilliard, 11/20/1929, 80year old, female    Chief Complaint:  Patient presents with:   Follow - Up: Right heel chronic ulcer s/p injury and achilles rupture, s/p debvridement and calcaneactomy  Pain  Wound       Subjective:    Patient is an 89 year nourished, in no apparent distress  LINES, TUBES, DRAINS:  PIC line - location LUE  SKIN: warm, dry  WOUND: right heel with wound vac in place now, previously assessed as -large opening, deep wound, surfaces dark,   right top of foot old blister site, on r GLOBULT 2.7 05/12/2011    GLOBULIN 4.1 08/06/2019    AGRATIO 1.6 02/21/2007    ALBGLOBRAT 1.5 05/12/2011     08/06/2019    K 3.8 08/06/2019     08/06/2019    CO2 27.0 08/06/2019       Assessment and plan:    Palliative consult- non helaing w allopurinol 200 mg daily     Hypothyroidism  - continue levothyroxine 125 mcg daily     Hx CVA  - Plavix 765 mg daily  - aspirin 81 mg daily     CKD III  - monitoring   - avoid nephrotoxic meds  - renal dose as able     Allergic rhinitis  - flonase nasal s

## 2019-08-26 ENCOUNTER — TELEPHONE (OUTPATIENT)
Dept: INTERNAL MEDICINE CLINIC | Facility: CLINIC | Age: 84
End: 2019-08-26

## 2019-08-26 NOTE — TELEPHONE ENCOUNTER
Patient's daughter, Vito Duffy, called to say the patient passed away on Saturday - call her back at #[phone] if we need any more info.

## 2022-02-09 NOTE — ED INITIAL ASSESSMENT (HPI)
Patient arrives from home following call from doctor for abnormal labs drawn yesterday. Reports slight abdominal cramping during the night and episode of vomiting and diarrhea. Elevated WBC and some renal insufficiency. Alert and appropriate.  Denies fever/ Subjective   Patient ID: Evaristo is a 33 month old male who is accompanied by:{-:[id number]}    Well Child 3 Year    HPI  Additional concerns today: {NONE:522329::\"None \"}    Review of Systems    {History Review (Optional):52724::\"Patient's medications, allergies, past medical, surgical, social and family histories were reviewed and updated as appropriate.\"}    Objective   Vitals: Temp 98.1 °F (36.7 °C) (Oral)   Ht 3' 2.66\" (0.982 m)   Wt (!) 17.4 kg (38 lb 7.5 oz)   HC 51.9 cm (20.43\")   BMI 18.10 kg/m²   BSA 0.67 m²   Growth parameters are noted and {are:26280::\"are\"} appropriate for age.    Physical Exam    Assessment   {Assess/PlanSmartLinks:76552}    Screening tests  Developmental milestones were reviewed and were: {FINDINGS:913558::\"normal based on age\"}    Counseling  Nutrition/Weight Management:  Assessment and discussion of current Nutrition behaviors performed:  {NUTR ASSES :283194}  Assessment and discussion of current Physical Activity behaviors performed: {EXERCISE ASSESS COUNSELED:293883}}    Immunizations: per orders.  History of previous adverse reactions to immunizations? {yes***/no:06476::\"no\"}  Immunizations given today: {Y/N:031661::\"No\"}    Follow-up yearly for a well visit, or sooner as needed.

## 2022-04-01 NOTE — PROGRESS NOTES
NURSING ADMISSION NOTE      Patient admitted via stretcher  Oriented to room. Safety precautions initiated. Bed in low position. Call light in reach. Aerobic culture from wound was sent by ED.  Lab was paged re blood culture Stop sertraline. Start lexapro. Consider seeing a counselor. Start Vitamin D supplement. Schedule appointment with Dr Rashid Olmstead. Recommend breathing exercise or meditation. Return to clinic if any concern.

## 2022-08-10 NOTE — TELEPHONE ENCOUNTER
Contacted the Chaitanya Jiang per Ruifna's request. The pt is in need of an appointment with the clinic tomorrow.      The pt was seen in the office today and has a wound pressure ulcer on the right heel that needs immediate attention and intervention 5

## 2024-08-14 NOTE — CM/SW NOTE
Met with patient and her daughter to discuss discharge planning. Patient evaluated by dr Dejah Cabrera from [facility]--sub acute recommendation. Daughter aware patient in observation--does not have 3 inpt midnight stays--discussed respite stay vs home with [facility]. PAST MEDICAL HISTORY:  No pertinent past medical history

## 2025-05-11 NOTE — PROGRESS NOTES
[facility] HOSPITAL[facility]  Progress Note     Manuel Challenger Patient Status:  Observation    1929 MRN [id number]   [facility] 8NE-A Attending Buddy Campa MD   Hosp Day # 0 PCP Maine Iqbal MD     Chief Complaint: Chest pain     S: Patie Pantoprazole Sodium  40 mg Oral QAM AC   • Metoprolol Succinate ER  100 mg Oral Daily Beta Blocker   • Levothyroxine Sodium  100 mcg Oral QAM AC   • furosemide  20 mg Oral Daily   • Insulin Aspart Pen  1-10 Units Subcutaneous TID AC and HS       ASSESSMENT Dr. Evangelista ,Attending Hospitalist, accepts for admission

## (undated) DEVICE — ZIMMER® A.T.S. CYLINDRICAL TOURNIQUET CUFF, DUAL PORT, SINGLE BLADDER 18 IN. (46 CM)

## (undated) DEVICE — GAUZE PACKING IODOFORM 1/2X5

## (undated) DEVICE — STERILE POLYISOPRENE POWDER-FREE SURGICAL GLOVES: Brand: PROTEXIS

## (undated) DEVICE — GOWN,SIRUS,FABRIC-REINFORCED,LARGE: Brand: MEDLINE

## (undated) DEVICE — LOWER EXTREMITY CDS-LF: Brand: MEDLINE INDUSTRIES, INC.

## (undated) DEVICE — REM POLYHESIVE INFANT PATIENT RETURN ELECTRODE: Brand: VALLEYLAB

## (undated) NOTE — LETTER
Date: 10/11/2018    Patient Name: Beverly Hilliard          To Whom it may concern: The above patient was seen at the [facility] for treatment of a medical condition.     Patient will need a:    Wheelchair with feet support  Dx: M17.0 osteo

## (undated) NOTE — IP AVS SNAPSHOT
Patient Demographics     Address  [address] [facility] [address] Phone  [phone] [facility]) *Preferred*  [phone] [facility]) E-mail Address  [email or web address]      Emergency Contact(s)     Name Relation Home Work [employer] Daughter provider within 7 days of hospital discharge. It is important that you attend this appointment to make sure your symptoms are under control. 2. Your recommended sodium intake is 1614-1539 mg daily    3.  Limit your fluid intake to no more than 2 liters Commonly known as:  PLAVIX      Take 1 tablet (75 mg total) by mouth once daily. Ge Ceron MD         Fluticasone Propionate 50 MCG/ACT Susp  Commonly known as:  FLONASE      1 spray by Each Nare route daily.           gabapentin 100 MG Caps  Common Bring a paper prescription for each of these medications  HYDROcodone-acetaminophen 5-325 MG Tabs  Pantoprazole Sodium 40 MG Tbec           [id number]-A - MAR ACTION REPORT  (last 24 hrs)    ** SITE UNKNOWN **     Order ID Medication Name Action Time Action Temp  97.7 °F (36.5 °C) Filed at 05/03/2019 1145   SpO2  98 % Filed at 05/03/2019 1300      Patient's Most Recent Weight       Most Recent Value   Patient Weight  73.3 kg (161 lb 9.6 oz)         Lab Results Last 24 Hours      BASIC METABOLIC PANEL (8) [519 Urine Culture, Routine Once [[id number]]  (Abnormal)  (Susceptibility) Collected:  05/01/19 0532    Order Status:  Completed Lab Status:  Final result Updated:  05/03/19 1031    Specimen:  Urine, clean catch      Urine Culture 50,000-99,000 CFU/ML Escheric She was also noted to have mild hyperkalemia. Her breathing has been stable. She has had some lightheaded sensation. In the ED she had an XR which showed oblique fracture in the distal fibular metadiaphysis.       Past Medical History:  Past Me • Polyneuropathy in diabetes 6/29/2012   • Primary localized osteoarthrosis, upper arm 6/20/2013    Log Date: 01/17/2012    • PVD (peripheral vascular disease) ([facility] 4/8/2014   • Rubella 9/16/2010   • S/P epidural steroid injection    • Scarlet fever 9/16/ wisdom teeth   • OTHER SURGICAL HISTORY      carotid artery surgery   • OTHER SURGICAL HISTORY  10/2011    endartarectomy (left femoral)    • REMOVAL OF HEEL SPUR      left foot: 1972 and right foot: 1973   • SPINE SURGERY PROCEDURE UNLISTED     • TONSILL HYDROcodone-acetaminophen  MG Oral Tab Take 1 tablet by mouth every 4 (four) hours as needed for Pain. Disp: 20 tablet Rfl: 0   Metoprolol Succinate  MG Oral Tablet 24 Hr Take 100 mg by mouth daily.  Disp:  Rfl:    Montelukast Sodium 10 MG Oral daily.   Disp:  Rfl:        Review of Systems:   A comprehensive 14 point review of systems was completed. Pertinent positives and negatives noted in the HPI.     Physical Exam:    /79   Pulse 87   Temp 97.4 °F (36.3 °C) (Temporal)   Resp 15   Ht 5 No results for input(s): TROP, CK in the last 168 hours. Imaging: Imaging data reviewed in Epic. ASSESSMENT / PLAN:[FA.1]     1. GLEN on CKD  1. Likely due to colchicine  2. Stop colchicine  3. IVF  4. Hold ACE-I  2. Hyperuricemia  1.  Repeat UA leve 1. IP consult to Orthopedic Surgery Once [[id number]] ordered by Brant Joseph MD at [facility]    Report of Consultation[RN.1]    Dee Gudino[RN.2] Patient Status:  Inpatient    1929 MRN [id number]   [employer] right and left   • Gout 9/16/2010   • Gout    • H/O head injury     major   • Hearing impairment    • Heart attack ([facility]    • High blood pressure    • High cholesterol    • Hypercholesterolemia 4/8/2014   • LBBB (left bundle branch block) 4/8/2014   • Leg (x5), 5 vessel [facility])   • CAROTID ENDARTERECTOMY  10/17/1995    left   • CAROTID ENDARTERECTOMY  02/28/1996    rt   • CATARACT  2009    right   • CATH DRUG ELUTING STENT     • CATH PERCUTANEOUS  TRANSLUMINAL CORONARY ANGIOPLASTY     • Malena Ruth Current Medications:[RN.1]    Current Facility-Administered Medications:  aspirin EC tab 81 mg 81 mg Oral Daily   Montelukast Sodium (SINGULAIR) tab 10 mg 10 mg Oral Nightly   Fluticasone Propionate (FLONASE) 50 MCG/ACT nasal spray 1 spray 1 spray lisinopril 10 MG Oral Tab Take 10 mg by mouth daily. Cholecalciferol (VITAMIN D) 1000 units Oral Tab Take 1,000 mg by mouth daily. acetaminophen 325 MG Oral Tab Take 2 tablets (650 mg total) by mouth every 6 (six) hours as needed.    HYDROcodone-acetami ROS - a comprehensive 10 point review of systems was completed. Pertinent positives and negatives noted in the HPI      Physical Exam:[RN.1]   Blood pressure 141/53, pulse 73, temperature 97.9 °F (36.6 °C), temperature source Oral, resp.  rate 18, height 5 IMPRESSION:  Oblique fracture involving the distal fibular metadiaphysis is noted. No significant displacement.    Dictated by: Samra Naranjo MD on 5/01/2019 at 15:39     Approved by: Samra Naranjo MD            Xr Chest Ap Portable  (cpt=71045) No notes of this type exist for this encounter. Imaging Results (HF patients)    Chest X-Ray Results (HF patients only)    No exam resulted this encounter. 2D Echocardiogram Results (HF patients only)    No exam resulted this encounter.       Cath Urinary tract infection without hematuria    Acute renal failure superimposed on chronic kidney disease, unspecified CKD stage, unspecified acute renal failure type (HCC)    Hyperkalemia    Closed fracture of distal end of left fibula, unspecified fractu • Other and unspecified noninfectious gastroenteritis and colitis 6/29/2012   • Other nonspecific findings on examination of blood(790.99) 4/8/2014   • [facility]peripheral artery disease) ([facility] 4/8/2014   • Peripheral vascular disease ([facility]    • Pneumonia due • HIP REPLACEMENT SURGERY  7/1998, 5/2000    left side- 1998, right side- 2000   • KNEE REPLACEMENT SURGERY     • KNEE REPLACEMENT SURGERY  9/16/2010    right   • KNEE TOTAL REVISION Right 7/1/2014    Performed by Ousmane Leonard MD at [facility] MAIN OR   • LAMINECT How much help from another person does the patient currently need. ..   -   Moving to and from a bed to a chair (including a wheelchair)?: A Lot   -   Need to walk in hospital room?: Total   -   Climbing 3-5 steps with a railing?: Total       AM-PAC Score: PT Discharge Recommendations: Sub-acute rehabilitation(ELOS of 12-14 days)     PLAN  PT Treatment Plan: Bed mobility; Endurance; Energy conservation;Patient education; Family education;Gait training;Strengthening;Transfer training;Balance training  [facility] [place] shoewear for RLE. \" Pt was recently discharged from [place] [address] on 4/28/19.       Therapy significant co-morbidities:  DM, CVA, seizures, PVD, PAD, OA, neuropathy, LBBB, HTN, gout, carotid stenosis, skin cancer, A-fib, anxiety, right lateral malleolar fracture    A • Hearing impairment    • Heart attack [facility])    • High blood pressure    • High cholesterol    • Hypercholesterolemia 4/8/2014   • LBBB (left bundle branch block) 4/8/2014   • Leg swelling 9/15/2011    lower leg localized swelling   • Measles 9/16/2010   • • CAROTID ENDARTERECTOMY  02/28/1996    rt   • CATARACT  2009    right   • CATH DRUG ELUTING STENT     • CATH PERCUTANEOUS  TRANSLUMINAL CORONARY ANGIOPLASTY     • COLONOSCOPY N/A 7/15/2014    Performed by Layne Heimlich, MD at [facility] ENDOSCOPY   • Satnam Zendejas Patient self-stated goal is to get her pain managed. OBJECTIVE  Precautions:  Other (Comment)(CAM boot for comfort to left ankle as tolerated)  Fall Risk: High fall risk    WEIGHT BEARING RESTRICTION  Weight Bearing Restriction: L lower extremity;R lowe Session approved by RN.  Pt received supine in bed, agreeable to therapy; family at bedside.      Supine to Sit: max asst  Sit to supine: total asst  Sit to Stand: pt unable to tolerate  Stand to sit: N/A  Toilet/Commode Transfers: N/A    Gait: N/A  Stairs: is below baseline and would benefit from skilled inpatient PT to address the above deficits to assist patient in returning to prior to level of function. Subacute rehab is recommended for 12-14 days.   After this period of rehabilitation patient should ach Attribution Larry    JM. 1 - Kyle Sykes, PT on 5/2/2019  8:49 AM                        Occupational Therapy Notes (last 72 hours) (Notes from 4/30/2019  4:37 PM through 5/3/2019  4:37 PM)      Occupational Therapy Note signed by Kat Marin OT at 5 CKD stage, unspecified acute renal failure type (HCC)    Hyperkalemia    Closed fracture of distal end of left fibula, unspecified fracture morphology, initial encounter    Leukocytosis, unspecified type    Dehydration    Acute gout due to renal impairment • PAD (peripheral artery disease) ([facility] 4/8/2014   • Peripheral vascular disease ([facility]    • Pneumonia due to organism    • Polyneuropathy in diabetes 6/29/2012   • Primary localized osteoarthrosis, upper arm 6/20/2013    Log Date: 01/17/2012    • PVD (cayla • KNEE TOTAL REVISION Right 7/1/2014    Performed by Sloan Alexandra MD at [facility] MAIN OR   • LAMINECTOMY     • ORAL SURGERY PROCEDURE  age 25    wisdom teeth   • OTHER SURGICAL HISTORY      carotid artery surgery   • OTHER SURGICAL HISTORY  10/2011    [facility] Safety Judgement:  decreased awareness of need for assistance and decreased awareness of need for safety  Awareness of Errors:  assistance required to identify errors made and assistance required to correct errors made    VISION  Current Vision: no change Patient End of Session: Up in chair;Needs met;Call light within reach;RN aware of session/findings; All patient questions and concerns addressed    ASSESSMENT     Patient is a 80year old female admitted on 5/1/2019 for UTI, L ankle fx.  Complete medical his OT Treatment Plan: Balance activities; Energy conservation/work simplification techniques;ADL training;Functional transfer training;UE strengthening/ROM; Endurance training;Patient/Family education;Patient/Family training;Equipment eval/education; Compensator

## (undated) NOTE — MR AVS SNAPSHOT
[address]  [address] [id number] [phone]               Thank you for choosing us for your health care visit with José Miguel Moralez MD.  We are glad to serve you and happy to provide you with this antonio This list is accurate as of: 4/10/17  2:24 PM.  Always use your most recent med list.                AmLODIPine Besylate 2.5 MG Tabs   TAKE 1 TABLET BY MOUTH DAILY. Commonly known as:  NORVASC           aspirin 81 MG Tabs   Take 81 mg by mouth daily. What changed:  See the new instructions.    Commonly known as:  PAMELOR           nystatin 025939 UNIT/GM Crea   Apply to affected area two to three times per day   Commonly known as:  MYCOSTATIN           penicillin v potassium 250 MG Tabs   Take 250 mg by

## (undated) NOTE — IP AVS SNAPSHOT
[address]            (For Outpatient Use Only) Initial Admit Date: 4/2/2019   Inpt/Obs Admit Date: Inpt: 4/2/19 / Obs: N/A   Discharge Date:    Ruthy Later:  [de-identified]   MRN: [de-identified]   CSN: [id number]        ENCOUNTER  Patient C Subscriber ID:  Pt Rel to Subscriber:    Hospital Account Financial Class: Medicare    April 7, 2019

## (undated) NOTE — Clinical Note
Dear Dr. Deanna Fisher,       Thank you for referring Arminda Rapp to the [facility].   Sincerely,  RADHA Flood

## (undated) NOTE — ED AVS SNAPSHOT
Ms. Amelia Wright   MRN: [id number]    Department:  [facility] Emergency Department in [place]   Date of Visit:  1/17/2018           Disclosure     Insurance plans vary and the physician(s) referred by the ER may not be covered by your plan.  Please c tell this physician (or your personal doctor if your instructions are to return to your personal doctor) about any new or lasting problems. The primary care or specialist physician will see patients referred from the [facility] Emergency Department.  Ajay Alamo

## (undated) NOTE — IP AVS SNAPSHOT
[address]            (For Outpatient Use Only) Initial Admit Date: 3/22/2018   Inpt/Obs Admit Date: Inpt: N/A / Obs: 03/22/18   Discharge Date:    Hospital Acct:  [de-identified]   MRN: [de-identified]   CSN: [id number]        [facility] Subscriber ID:  Pt Rel to Subscriber:    Hospital Account Financial Class: Medicare    March 26, 2018

## (undated) NOTE — LETTER
Janeth Lorenzo [address] [address]  Martina, [address] [place] [address]    Consent for Operation  Date: __________________                                Time: _______________    1.  I authorize the performance upon Zeeshan Harsh the following operation:  Petty procedure has been videotaped, the surgeon will obtain the original videotape. The hospital will not be responsible for storage or maintenance of this tape.   7. For the purpose of advancing medical education, I consent to the admittance of observers to the STATEMENTS REQUIRING INSERTION OR COMPLETION WERE FILLED IN.     Signature of Patient:   ___________________________    When the patient is a minor or mentally incompetent to give consent:  Signature of person authorized to consent for patient: ____________ supplements, and pills I can buy without a prescription (including street drugs/illegal medications). Failure to inform my anesthesiologist about these medicines may increase my risk of anesthetic complications. iv.  If I am allergic to anything or have ha Anesthesiologist Signature     Date   Time  I have discussed the procedure and information above with the patient (or patient’s representative) and answered their questions. The patient or their representative has agreed to have anesthesia services.     ___

## (undated) NOTE — IP AVS SNAPSHOT
Patient Demographics     Address  [address] [facility] [address] Phone  [phone] [facility]) *Preferred*  [phone] [facility]) E-mail Address  [email or web address]      Emergency Contact(s)     Name Relation Home Work [employer] [address] [place] [address] possible for a visit in 4 weeks.     Specialties:  Cardiovascular Diseases, CARDIOLOGY  Contact information:  [address] [place] [phone]             Schedule an appointment as soon as possible for a visit with Lester Barnes fentaNYL 25 MCG/HR Pt72  Commonly known as:  Sunshine Angel  Next dose due:  8/2/19 9pm      Place 1 patch onto the skin every third day.   Stop taking on:  8/29/2019   Briana Chung MD         Fluticasone Propionate 50 MCG/ACT Susp  Commonly known as:  Misty Muhammad Benson Christine MD         nitroGLYCERIN 0.2 MG/HR Pt24  Commonly known as:  NITRODUR      Place 1 patch onto the skin daily as needed.           Normal Saline Flush 0.9 % Soln  Start taking on:  7/31/2019  Next dose due:  7/31 2100      Inject 10 mL into the Order ID Medication Name Action Time Action Reason Comments    [id number] HYDROcodone-acetaminophen [facility])  MG per tab 1-2 tablet 07/30/19 0212 Given      [id number] HYDROcodone-acetaminophen (NORCO)  MG per tab 1-2 tablet 07/30/19 1303 Given Ordering provider:  Elizabeth Braswell MD  07/26/19 5558 Resulting lab:  [facility]    Specimen Information    Type Source Collected On   Blood — 07/26/19 4404          Components    Component Value Reference Range Flag Lab   Path Comment CBC -- — [facility] Levofloxacin >=8  Resistant    Meropenem <=0.25  Sensitive    Piperacillin + Tazobactam >=128  Resistant    Trimethoprim/Sulfa >=320  Resistant               Susceptibility      Escherichia coli  ESBL Pos     Not Specified    Cefazolin 8  Sensitive    Cef was initially seen at [facility] at the [address]; she underwent debridement. More recently, she underwent debridement at the [facility], and she reports the debridement there was more aggressive.   On the evening of presentation, the patient w swelling, measles, mitral valve disorder, muscle weakness, neuropathy, OA, diabetic foot ulcer, hyperlipidemia, gastroenteritis, peripheral vascular disease, pneumonia, polyneuropathy due to diabetes, osteoarthritis of upper arm, rubella, seizure disorder PLAN:  Debridement of the diabetic ulcer with application of embryonic tissue, possible wound VAC. Infectious Disease has seen the patient. They see no need for systemic antibiotics at this time. Plan is surgical debridement at [facility] on 07/26/2019.   [facility] surgery, chronic systolic  CHF, pulm HTN, CKD, LBBB,   Hypertension. She injured her right heel 2 days ago and has been advised to undergo debridement  surgery to relieve pain.  There is a non-healing diabetic ulcer and probable achilles tendon rupture on • Other and unspecified hyperlipidemia 6/29/2012   • Other and unspecified noninfectious gastroenteritis and colitis 6/29/2012   • Other nonspecific findings on examination of blood(790.99) 4/8/2014   • PAD (peripheral artery disease) ([facility] 4/8/2014   • Pe • HIP REPLACEMENT SURGERY  7/1998, 5/2000    left side- 1998, right side- 2000   • KNEE REPLACEMENT SURGERY     • KNEE REPLACEMENT SURGERY  9/16/2010    right   • KNEE TOTAL REVISION Right 7/1/2014    Performed by Valentino Grande MD at [facility] MAIN OR   • LAMINECT She reports that she does not drink alcohol or use drugs. Review of Systems:  All systems were reviewed and are negative except as described above in HPI.     Physical Exam:      Wt Readings from Last 3 Encounters:  07/24/19 : 165 lb 5.5 oz (75 kg)  07/0 CXR, 7/26/2019:            Impression:    1. Possible right achilles tendon rupture due to recent trauma    2. Chronic right foot ulcer    3. CAD, s/p CABG, 1990, stents prior to 2012    4. Chronic systolic  CHF, LVEF = 15%. Compensated today    5.  CKD Presenting Problem: S/p Right calcaneal avulsion fracture s/p debridement and partial calcanectomy (7/26/19)[NP.2]  History related to current admission: Pt is 80year old female admitted on 7/24/2019 from home with c/o right ankle pain.   Pt diagnosed with • Cellulitis and abscess of leg, except foot 6/29/2012   • Chronic sinusitis    • [facility]    • Coronary atherosclerosis of unspecified type of vessel, native or graft 6/29/2012   • Cough due to ACE inhibitor 4/8/2014   • CVA (cerebra • Unspecified urinary incontinence 6/29/2012   • Unspecified venous (peripheral) insufficiency 6/29/2012   • Visual impairment[NP.2]        Past Surgical History[NP.1]  Past Surgical History:   Procedure Laterality Date   • ACTIVE WOUND CARE/20 CM OR <  9/ \"I can't do it. I need to go back to sleep. \" Patient needed max encouragement to participate. No family present.     Patient’s self-stated goal is unclear    OBJECTIVE[NP.1]  Precautions: Seizure;Hard of hearing[NP.2]    WEIGHT BEARING RESTRICTION[NP.1]  W Comment : Above score is based on FIM definations[NP.2]    Skilled Therapy Provided: Patient was received in bed. Needed mod to min assist to roll in bed. Patient needed mod to max assist of 2 for supine to sit transfers.  Sat @ edge of bed with poor+ to [facility] mobility. Research supports that patients with this level of impairment may benefit from Sub Acute Rehab . Subacute rehab is recommended for 12-14 days.   After this period of rehabilitation patient should achieve min asst level in bed mobility and transfers open displaced fx of R calcaneus and avulsion of Achlles tendon with prior R ankle wound. Pt is s/p debridement and partial calcanectomy. H/O: CHF, DM, PVD, HTN, CABG, CKD, A-Fib, CVA[MM. 4]      Problem List[MM. 1]  Principal Problem:    Open displac • Hyperuricemia    • LBBB (left bundle branch block) 4/8/2014   • Leg swelling 9/15/2011    lower leg localized swelling   • Measles 9/16/2010   • Mitral valve disorders(424.0) 6/29/2012   • Muscle weakness    • Neuropathy    • OA (osteoarthritis) 4/8/2014 • CAROTID ENDARTERECTOMY  02/28/1996    rt   • CATARACT  2009    right   • CATH DRUG ELUTING STENT     • CATH PERCUTANEOUS  TRANSLUMINAL CORONARY ANGIOPLASTY     • COLONOSCOPY N/A 7/15/2014    Performed by Fatimah Cook MD at [facility] ENDOSCOPY   • Satnam Zendejas -   Putting on and taking off regular lower body clothing?: Total  -   Bathing (including washing, rinsing, drying)?: A Lot  -   Toileting, which includes using toilet, bedpan or urinal? : Total  -   Putting on and taking off regular upper body clothing?: and  this patient  is demonstrating a 70% degree impairment in activities of daily living. Research supports that patients with this level of impairment often benefit from SNF. Subacute rehab is recommended for 16-19 days. [MM.4]        OT Discharge Recomm Pneumovax 11/24/14     Pneumovax 01/15/07     TDAP 03/22/18       Future Appointments        Provider Allie Gaines    8/7/2019 2:00 PM [facility]    8/26/2019 12:30 PM Jazlyn Keen MD [address]

## (undated) NOTE — IP AVS SNAPSHOT
[address]            (For Outpatient Use Only) Initial Admit Date: 7/24/2019   Inpt/Obs Admit Date: Inpt: 7/24/19 / Obs: N/A   Discharge Date:    Es [place]:  [de-identified]   MRN: [de-identified]   CSN: [id number]   CEID: [id number] Group Number:  Insurance Type:    Subscriber Name:  Subscriber :    Subscriber ID:  Pt Rel to Subscriber:    Hospital Account Financial Class: Medicare    2019

## (undated) NOTE — IP AVS SNAPSHOT
Patient Demographics     Address  [address] [address] [address] Phone  [phone] (Home) *Preferred* E-mail Address  [email or web address]      Emergency Contact(s)     Name Relation Home Work [employer] Daughter [phone] Fluocinonide 0.05 % Oint  Commonly known as:  LIDEX  Next dose due:  3/27/18      apply to affected twice daily   Ashvin Bess MD         furosemide 20 MG Tabs  Commonly known as:  LASIX  Next dose due:  3/27/18      Take 1 tablet (20 mg total) by Generic drug:  silver sulfADIAZINE      Apply to rash as needed          UNKNOWN TO PATIENT      Topical cancer tx                Where to Get Your Medications      Please  your prescriptions at the location directed by your doctor or nurse    Bring [id number] Insulin Aspart Pen (NOVOLOG) 100 UNIT/ML flexpen 1-10 Units 03/25/18 2052 Given      [id number] Insulin Aspart Pen (NOVOLOG) 100 UNIT/ML flexpen 1-10 Units 03/26/18 1252 Given                    Recent Vital Signs    Flowsheet Row Most Recent Janis chest pain during her hospital course. This is since resolved. She denies any current chest pain or shortness of breath. She did not have any chest pain at the time of the fall.   She denies hitting the front of her chest.  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  She has never used smokeless tobacco. She Disp: 90 tablet Rfl: 3   GÓMEZ CONTOUR NEXT TEST In Vitro Strip TEST ONCE DAILY Disp: 100 strip Rfl: 3   Fluocinonide 0.05 % External Ointment apply to affected twice daily Disp: 30 g Rfl: 0   glimepiride 4 MG Oral Tab TAKE 1 TABLET (4 MG TOTAL) BY MOUTH 2 Respiratory: Clear to auscultation bilaterally. No wheezes. No rhonchi. Cardiovascular: S1, S2. Regular rate and rhythm. No murmurs, rubs or gallops. Equal pulses. Chest and Back: No tenderness or deformity. Hematoma visible along the posterior neck. Plan of care discussed with Patient. Kaye Marquis MD  3/22/2018[AS.1]                        Electronically signed by Ruby Butler MD on 3/22/2018  9:57 PM   Attribution Key    AS. 1 - Ruby Butler MD on 3/22/2018  9:19 PM  AS. 2 - Neema Spain 40, M • Atherosclerosis of coronary artery    • Atrial fibrillation ([facility] 6/29/2012   • Back pain 4/8/2014    Lumbar    • Cancer [facility])     skin cancer   • Carotid stenosis 4/8/2014   • Carpal tunnel syndrome 6/20/2013    Log Date: 01/17/2012    • Cataract     le • Unspecified urinary incontinence 6/29/2012   • Unspecified venous (peripheral) insufficiency 6/29/2012        Past Surgical History: Past Surgical History:  9/27/2011: ACTIVE WOUND CARE/20 CM OR <      Comment: diabetic ulcer L great toe  No date: Everardo • heart failure [OTHER] Father    • alzheimer's disease [OTHER] Mother    • Heart Disease Other      fam hx-sister,brother, child   • heart failure [OTHER] Sister    • colon polyps [OTHER] Other      child   • skin cancer [OTHER] Other      child   • ulcer NORTRIPTYLINE HCL 25 MG Oral Cap TAKE 1 CAPSULE (25 MG TOTAL) BY MOUTH NIGHTLY.  Disp: 90 capsule Rfl: 1   CLOPIDOGREL BISULFATE 75 MG Oral Tab TAKE ONE TABLET BY MOUTH EVERY DAY Disp: 90 tablet Rfl: 2   Multiple Vitamins-Minerals (MULTIVITAMIN OR) Take by rebound, guarding or organomegaly. Neurologic: No focal neurological deficits. CNII-XII grossly intact. Musculoskeletal: Moves all extremities. Extremities: No edema or cyanosis. Integument: No rashes or lesions.    Psychiatric: Appropriate mood and aff Filed:  3/26/2018  3:15 PM Date of Service:  3/26/2018  3:09 PM Status:  Signed    :  Racheal Brock MD (Physician)       . [facility] Patient Status:  Observation    1929 MRN [id number]   [place] [id number] foraminal stenosis is otherwise noted. Neurosurgery recommends a TLSO.   She was started in PT and OT with moderate to severe functional impairments thus rehab consult requested she has midthoracic pain deep and aching moderate to severe and worse with • OA (osteoarthritis) 4/8/2014   • Open wound of foot except toe(s) alone, without mention of complication 3/75/2978    diabetic foot ulcer/L great toe   • Other and unspecified hyperlipidemia 6/29/2012   • Other and unspecified noninfectious gastroenterit Comment: x2  No date: HIP REPLACEMENT SURGERY  7/1998, 5/2000: HIP REPLACEMENT SURGERY      Comment: left side- 1998, right side- 2000  No date: KNEE REPLACEMENT SURGERY  9/16/2010: KNEE REPLACEMENT SURGERY      Comment: right  No date: LAMINECTOMY  ag glucose (DEX4) oral liquid 15 g 15 g Oral Q15 Min PRN   Or      Glucose-Vitamin C (DEX-4) 4-0.006 g chewable tab 4 tablet 4 tablet Oral Q15 Min PRN   Or      dextrose 50% injection 50 mL 50 mL Intravenous Q15 Min PRN   Or      glucose (DEX4) oral liquid 30 Head:  Normocephalic, without obvious abnormality, atraumatic. Eyes:  Conjunctivae/lids clear. PERRL, EOMs intact. Vision functional.   Ears/Nose/Throat: Hearing intact. Lips, mucosa, and tongue normal. Teeth and gums normal. Moist mucous membranes. necessity requiring acute inpatient rehabilitation, pt would benefit from subacute rehabilitation, working with PT/OT to upgrade present functional status, provide family training, assess home equipment and assistive device needs.             24 hr rehab nu Pt is 80year old female admitted on 3/22/2018 from home s/p fall with c/o back pain. Pt diagnosed with subacute to acute superior endplate compression deformities of T1-T2. Mild to moderate acute compression deformity of T3.  T8 compressoin deformity is c • CVA (cerebral infarction) 4/8/2014   • Diabetes mellitus ([facility] 9/26/2013   • Esophageal reflux 6/29/2012   • Gout 9/16/2010   • Gout    • H/O head injury     major   • Hypercholesterolemia 4/8/2014   • LBBB (left bundle branch block) 4/8/2014   • Leg swe 02/28/1996: CAROTID ENDARTERECTOMY      Comment: rt  2009: CATARACT      Comment: right  7/14/2014: COLONOSCOPY      Comment: Procedure: COLONOSCOPY;  Surgeon: Sumaya Wright MD;  Location: [address] [place] [address] ENDOSCOPY  7/15/2014: COLONOSCOPY      Com How much difficulty does the patient currently have. ..  -   Turning over in bed (including adjusting bedclothes, sheets and blankets)?: A Little   -   Sitting down on and standing up from a chair with arms (e.g., wheelchair, bedside commode, etc.): A Diliptl Pt with call light in reach. RN informed of session. Mobility Guidelines updated in Pt room for nursing staff.        THERAPEUTIC EXERCISES  Lower Extremity Ankle pumps     Upper Extremity Elbow flex/ext and  - open/close     Position Sitting     Re assistance level: minimum assistance - met[AR.1] 3/26/2018[AR.2]       Goal #3 Patient is able to ambulate 25 feet with assist device: walker - rolling at assistance level: minimum assistance - met[AR.1] 3/26/2018[AR.2]       Goal #4 Patient is able to neg T3.  No significant retropulsion of bone. 3/22/2018 CT Cervical Spine  CONCLUSION:    1. There is age-indeterminate but possibly recent minimal compression deformity of the superior endplates of the T1 and T2 vertebral bodies.   2.  No acute fracture or • Other and unspecified noninfectious gastroenteritis and colitis 6/29/2012   • Other nonspecific findings on examination of blood(790.99) 4/8/2014   • PAD [facility]peripheral artery disease) [facility] 4/8/2014   • Polyneuropathy in diabetes 6/29/2012   • Primary loca 9/16/2010: KNEE REPLACEMENT SURGERY      Comment: right  No date: LAMINECTOMY  age 25: ORAL SURGERY PROCEDURE      Comment: wisdom teeth  No date: OTHER SURGICAL HISTORY      Comment: carotid artery surgery  10/2011: OTHER SURGICAL HISTORY      Comment: en B hip flex seated 0-1/4 AROM limited due to low back and hip pain    Lower extremity strength is grossly 3+/5 throughout except B hip flex inaccurate due to pain    BALANCE  Static Sitting: Fair -  Dynamic Sitting: Poor  Static Standing: Poor +  Dynamic St Max A for trunk support. Pt able to sit EOB x 10 min c Min-Mod A for balance. Intermittently, pt could maintain balance 5-10 sec c CGA and maximal cues. Pt c/o increased back pain seated.  Pt completes sit<>stand x 2 c Mod Ax 2 and maximal cues for anterior '6-Clicks' Inpatient Basic Mobility Short Form was completed and this patient is demonstrating a 69% degree of impairment in mobility. Research supports that patients with this level of impairment may benefit from TIM.   Based on this evaluation, patient's Goal #3 Patient is able to ambulate 25 feet with assist device: walker - rolling at assistance level: minimum assistance     Goal #4 Patient is able to negotiate one stoop with minimal assistance in order to access home environment.    Goal #5 Patient wi Past Medical History:   Diagnosis Date   • Abnormality of gait 6/29/2012   • Acute myocardial infarction [facility]) 3/19/2012   • Atherosclerosis of coronary artery    • Atrial fibrillation ([facility] 6/29/2012   • Back pain 4/8/2014    Lumbar    • Cancer ([facility] • Unspecified essential hypertension 6/29/2012   • Unspecified hypothyroidism 6/29/2012   • Unspecified urinary incontinence 6/29/2012   • Unspecified venous (peripheral) insufficiency 6/29/2012       Past Surgical History  Past Surgical History:  9/27/201 WEIGHT BEARING RESTRICTION  Weight Bearing Restriction: None    PAIN ASSESSMENT  Ratin  Location: back, groin  Management Techniques: Repositioning;Relaxation;Breathing techniques; Body mechanics; Activity promotion     ACTIVITY TOLERANCE[CC.1]  Room air recommendations with /; Family present    ASSESSMENT[CC.1]   Patient is a 80year old female admitted on 3/22/2018 for T1-3 compression Fx's s/p fall . Complete medical history and occupational profile noted above.  Functional outcom Occupational Therapy Note signed by Aileen Adams OT at 3/25/2018  3:03 PM  Version 1 of 1    Author:  Aileen Adams OT Service:  Rehab Author Type:  Occupational Therapist    Filed:  3/25/2018  3:03 PM Date of Service:  3/25/2018  2:45 PM Status:  Sheela Treadwell • Coronary atherosclerosis of unspecified type of vessel, native or graft 6/29/2012   • Cough due to ACE inhibitor 4/8/2014   • CVA (cerebral infarction) 4/8/2014   • Diabetes [facility] 9/26/2013   • Esophageal reflux 6/29/2012   • Gout 9/16/2010   • 11/1990: CABG      Comment: (x5), 5 vessel [facility])  10/17/1995: CAROTID ENDARTERECTOMY      Comment: left  02/28/1996: CAROTID ENDARTERECTOMY      Comment: rt  2009: CATARACT      Comment: right  7/14/2014: COLONOSCOPY      Comment: Procedure: CO follows behind for toileting due to fear of falls). Pt has a bad left knee which was replaced in 1998. R knee revised in 2014.      SUBJECTIVE   \"my knee beti on me all the time\"    Patient self-stated goal is be able to walk and get better     OBJECTI practiced rolling side to side to put on TLSO brace with total assistance. Educated pt's daughter on how to emely TLSO brace correctly in supine.  Pt completed log roll for supine to EOB with Max A and was noted to be quite retropulsive while seated at EOB, about pt requiring rehab services and 24 hour care/supervision at home for safety with ADLs and transfers.      The patient is functioning below her previous functional level and would benefit from skilled inpatient OT to address the above deficits, maximiz Per RN, pt was ordered to wear a TLSO brace which is still pending from [facility]. Will re-attempt to see pt for initial OT Eval once brace arrives and as schedule permits. Thank you[MS. 1]      Attribution Larry    MS. 1 - Paresh Sibley [facility], OT on 3/24/2018 12:59 P

## (undated) NOTE — IP AVS SNAPSHOT
Patient Demographics     Address  [address] [facility] [address] Phone  [phone] [facility]) *Preferred*  [phone] [facility]) E-mail Address  [email or web address]      Emergency Contact(s)     Name Relation Home Work [employer] Daughter provider within 7 days of hospital discharge. It is important that you attend this appointment to make sure your symptoms are under control. 2. Your recommended sodium intake is 5185-2188 mg daily    3.  Limit your fluid intake to no more than 2 liters reschedule 7-10 days from 4/7)  Contact information:  [place] [address] 013 [address]           Roman Carrillo MD.    Specialties:  Internal Medicine, IP Consult to Primary Care  Contact information:  [address] [place] [address] 1 Commonly known as:  PRINIVIL,ZESTRIL  Next dose due:  04/08/2019 9am      Take 1 tablet (10 mg total) by mouth daily. Paolo Doe MD         Metoprolol Succinate  MG Tb24  Commonly known as:   Toprol XL  Next dose due:  04/09/2019 9am      Take Please  your prescriptions at the location directed by your doctor or nurse    Bring a paper prescription for each of these medications  acetaminophen 325 MG Tabs  HYDROcodone-acetaminophen  MG Tabs  torsemide 20 MG Tabs  traMADol HCl 50 MG Ta Pulse  76 Filed at 04/07/2019 1300   Resp  18 Filed at 04/07/2019 1300   Temp  98 °F (36.7 °C) Filed at 04/07/2019 1300   SpO2  100 % Filed at 04/07/2019 1300      Patient's Most Recent Weight       Most Recent Value   Patient Weight  79.3 kg (174 lb 13.2 denies nausea vomiting fever chills diarrhea constipation.     Past Medical History:  Past Medical History:   Diagnosis Date   • Abnormality of gait 6/29/2012   • Acute myocardial infarction [facility]) 3/19/2012   • Atherosclerosis of coronary artery    • Atrial • Type II or unspecified type diabetes mellitus without mention of complication, not stated as uncontrolled    • Unspecified essential hypertension 6/29/2012   • Unspecified hypothyroidism 6/29/2012   • Unspecified urinary incontinence 6/29/2012   • Unspec drugs.     Family History:   Family History   Problem Relation Age of Onset   • Diabetes Father    • Other (heart failure) Father    • Other (alzheimer's disease) Mother    • Heart Disease Other         fam hx-sister,brother, child   • Other (heart failure) penicillin v potassium 250 MG Oral Tab Take 250 mg by mouth daily. Disp:  Rfl: 2   SSD 1 % Apply Externally Cream Apply to rash as needed Disp:  Rfl: 3   aspirin 81 MG Oral Tab EC Take 81 mg by mouth daily.  Disp:  Rfl:    Calcium-Vitamin D (CALTRATE 600 GFRAA  48*  44*  45*   GFRNAA  41*  38*  39*   CA  8.8  8.8  8.9   ALB  2.7*  2.8*  2.7*   NA  129*  128*  129*   K  4.7  4.5  4.9   CL  97*  94*  96*   CO2  26.0  26.0  26.0   ALKPHO  114  119  121   AST  28  31  33   ALT  28  30  29   BILT  0.4  0.4  0.4 PATIENT'S NAME: Justine Flores   ATTENDING PHYSICIAN: MARIANNA Srivastava: Ginger Ruiz M.D.    PATIENT ACCOUNT#:   [de-identified]    LOCATION:  [address] [place] [address]  MEDICAL RECORD #:   [id number]       YOB: 1929  ADMIS since.  On the ER admission, patient's chest x-ray had some increased interstitial infiltrates.   She complained of peripheral edema, and since receiving IV Lasix and having a significant urine output with over 2 L out greater than in, she is improved with ASSESSMENT:  Decompensation of long compensated heart failure after dismissing diuretic and increasing amlodipine dose. PLAN:  Reinitiate Lasix with IV diuretics for now. Cut amlodipine dose. We will discuss with Renal Services.     Dictated By Beverly Da Silva Physical Therapy Notes (last 72 hours) (Notes from 4/4/2019  3:43 PM through 4/7/2019  3:43 PM)      Physical Therapy Note signed by Ari Hackett PT at 4/7/2019 12:14 PM  Version 1 of 1    Author:  Ari Hackett PT Service:  — Author Type:  Phys • Gout 9/16/2010   • Gout    • H/O head injury     major   • Hearing impairment    • Heart attack ([facility]    • High blood pressure    • High cholesterol    • Hypercholesterolemia 4/8/2014   • LBBB (left bundle branch block) 4/8/2014   • Leg swelling 9/15/201 (x5), 5 vessel [facility])   • CAROTID ENDARTERECTOMY  10/17/1995    left   • CAROTID ENDARTERECTOMY  02/28/1996    rt   • CATARACT  2009    right   • CATH DRUG ELUTING STENT     • CATH PERCUTANEOUS  TRANSLUMINAL CORONARY ANGIOPLASTY     • Malena Ruth Static Sitting: Fair +  Dynamic Sitting: Fair +           Static Standing: Fair(used RW)  Dynamic Standing: Fair(used RW)[AZ.2]    ACTIVITY TOLERANCE                         O2 WALK verbal cues for sequence & more verbal cues to improve severe forward flexed posture with chair follow.   Pt was returned to sitting stand->sit cga with verbal & tactile cues for hand placement/safety/set-up; soiled diaper removed prior to having pt return Rehab Potential : Good  Frequency (Obs): Daily[AZ. 2]    CURRENT GOALS[AZ. 1]   Goal #1 Patient is able to demonstrate supine - sit EOB @ level: minimum assistance      Goal #2 Patient is able to demonstrate transfers EOB to/from Chair/Wheelchair at [place] • Carotid stenosis 4/8/2014   • Carpal tunnel syndrome 6/20/2013    Log Date: 01/17/2012    • Cataract     left -- 2008 and right -- 2009   • Cellulitis and abscess of leg, except foot 6/29/2012   • Chronic sinusitis    • [facility] • Unspecified hypothyroidism 6/29/2012   • Unspecified urinary incontinence 6/29/2012   • Unspecified venous (peripheral) insufficiency 6/29/2012   • Visual impairment[KN. 3]        Past Surgical History[KN.1]  Past Surgical History:   Procedure Laterality Patient’s self-stated goal is[KN. 1]- go home.[KN.2]    OBJECTIVE       WEIGHT BEARING RESTRICTION[KN.1]  Weight Bearing Restriction: None[KN. 3]                PAIN ASSESSMENT[KN.1]   Rating: Unable to rate  Location: (B knee pain)  Management Techniques: A Pt unmotivated to cont due to fear of falling. Pt required mod VC to be redirect to cont skilled therapy. Pt agreed to participate in therex listed below. Max tactile and VC to complete ex. /85 after completion of activity.  HEP provided towards en Occupational Therapy Notes (last 72 hours) (Notes from 4/4/2019  3:43 PM through 4/7/2019  3:43 PM)    No notes of this type exist for this encounter.         Video Swallow Study Note - Video Swallow Study Notes      Video Swallow Study Note signed by Blanca Min • Chronic sinusitis    • Congestive heart disease (HCC)    • Coronary atherosclerosis of unspecified type of vessel, native or graft 6/29/2012   • Cough due to ACE inhibitor 4/8/2014   • CVA (cerebral infarction) 4/8/2014   • Diabetes mellitus ([facility] 9/26/2 Current Diet Consistency: Regular; Thin liquids           Precautions: Aspiration  Imaging results:   CXR from 4/1/2019 revealed:  CONCLUSION:    Worsening appearance, with increasing cardiomegaly, increasing vascular congestion and development/worsening mi Overall Impression: Patient presents with grossly functional, age appropriate oropharyngeal swallow.  There was inconsistent transient laryngeal penetration with thin liquids by cup, none noted by straw and there were no episodes of observed tracheal aspira Mastication was a bit slow but WFL. Of note, she was noted to need to pause chewing to take a breath as she was a bit SOB. She did pace herself properly with self presenting po.   Laryngeal excursion appeared of adequate strength and timeliness to palpati • Coronary atherosclerosis of unspecified type of vessel, native or graft 6/29/2012   • Cough due to ACE inhibitor 4/8/2014   • CVA (cerebral infarction) 4/8/2014   • [facility] 9/26/2013   • Disorder of thyroid    • Esophageal reflux 6/29/201 Patient/Family Goals: to get better    SWALLOWING HISTORY  Current Diet Consistency: Regular; Thin liquids  Dysphagia History: denied  Imaging Results:   CXR from 4/1/19 revealed:  CONCLUSION:    Worsening appearance, with increasing cardiomegaly, increasin Electronically signed by Angelica Burgess on 4/3/2019 10:27 AM   Attribution Larry    JL. 1 - Angelica Burgess on 4/3/2019 10:19 AM                     Immunizations     Name Date      Fluad 0.5ml 10/11/18     Fluad 0.5ml 10/10/17     INFLUENZA 10/14/16     I

## (undated) NOTE — Clinical Note
Pt will be coming in for HFU appt on 6/27 with RADHA Moreira. TE sent to triage regarding refill request for DM test strips. Thank you!

## (undated) NOTE — IP AVS SNAPSHOT
[address]            (For Outpatient Use Only) Initial Admit Date: 5/1/2019   Inpt/Obs Admit Date: Inpt: 5/1/19 / Obs: N/A   Discharge Date:    Jovani Adhikari:  [de-identified]   MRN: [de-identified]   CSN: [id number]   CEID: [id number] Subscriber ID:  Pt Rel to Subscriber:    Hospital Account Financial Class: Medicare    May 3, 2019

## (undated) NOTE — ED AVS SNAPSHOT
Ms. Phill Snellen   MRN: [id number]    Department:  [facility] Emergency Department   Date of Visit:  7/8/2019           Disclosure     Insurance plans vary and the physician(s) referred by the ER may not be covered by your plan.  Please contact tell this physician (or your personal doctor if your instructions are to return to your personal doctor) about any new or lasting problems. The primary care or specialist physician will see patients referred from the [facility] Emergency Department.  Wing Burns